# Patient Record
Sex: FEMALE | Race: WHITE | NOT HISPANIC OR LATINO | Employment: FULL TIME | URBAN - METROPOLITAN AREA
[De-identification: names, ages, dates, MRNs, and addresses within clinical notes are randomized per-mention and may not be internally consistent; named-entity substitution may affect disease eponyms.]

---

## 2017-06-26 ENCOUNTER — TELEPHONE (OUTPATIENT)
Dept: OBSTETRICS AND GYNECOLOGY | Facility: CLINIC | Age: 47
End: 2017-06-26

## 2017-06-26 DIAGNOSIS — Z12.39 BREAST CANCER SCREENING: Primary | ICD-10-CM

## 2017-06-26 NOTE — TELEPHONE ENCOUNTER
----- Message from Shavonne Naylor MA sent at 6/26/2017  9:48 AM CDT -----  Contact: Pt     ----- Message -----  From: Richard Piña  Sent: 6/23/2017   4:52 PM  To: Janeth VELASCO Staff    X_ 1st Request  _ 2nd Request  _ 3rd Request    Who:YAMIL HONEYCUTT [[id number]]    Why: Patient would like to have her Mammogram orders put in    What Number to Call Back: [phone]    When to Expect a call back: (Before the end of the day)  -- if call after 3:00 call back will be tomorrow.    Done. GH

## 2017-07-31 ENCOUNTER — OFFICE VISIT (OUTPATIENT)
Dept: OBSTETRICS AND GYNECOLOGY | Facility: CLINIC | Age: 47
End: 2017-07-31
Payer: COMMERCIAL

## 2017-07-31 ENCOUNTER — HOSPITAL ENCOUNTER (OUTPATIENT)
Dept: RADIOLOGY | Facility: OTHER | Age: 47
Discharge: HOME | End: 2017-07-31
Attending: NURSE PRACTITIONER
Payer: COMMERCIAL

## 2017-07-31 VITALS
DIASTOLIC BLOOD PRESSURE: 70 MMHG | WEIGHT: 182.56 LBS | BODY MASS INDEX: 32.35 KG/M2 | HEIGHT: 63 IN | SYSTOLIC BLOOD PRESSURE: 110 MMHG

## 2017-07-31 DIAGNOSIS — Z12.39 BREAST CANCER SCREENING: ICD-10-CM

## 2017-07-31 DIAGNOSIS — N92.0 MENORRHAGIA WITH REGULAR CYCLE: ICD-10-CM

## 2017-07-31 DIAGNOSIS — Z01.419 VISIT FOR GYNECOLOGIC EXAMINATION: Primary | ICD-10-CM

## 2017-07-31 PROCEDURE — 77067 SCR MAMMO BI INCL CAD: CPT | Mod: 26,,, | Performed by: RADIOLOGY

## 2017-07-31 PROCEDURE — 77067 SCR MAMMO BI INCL CAD: CPT | Mod: TC

## 2017-07-31 PROCEDURE — 77063 BREAST TOMOSYNTHESIS BI: CPT | Mod: 26,,, | Performed by: RADIOLOGY

## 2017-07-31 PROCEDURE — 99999 PR PBB SHADOW E&M-EST. PATIENT-LVL III: CPT | Mod: PBBFAC,,, | Performed by: OBSTETRICS & GYNECOLOGY

## 2017-07-31 PROCEDURE — 88142 CYTOPATH C/V THIN LAYER: CPT

## 2017-07-31 PROCEDURE — 99396 PREV VISIT EST AGE 40-64: CPT | Mod: S$GLB,,, | Performed by: OBSTETRICS & GYNECOLOGY

## 2017-07-31 RX ORDER — TRANEXAMIC ACID 650 MG/1
1300 TABLET ORAL 3 TIMES DAILY
Qty: 60 TABLET | Refills: 6 | Status: SHIPPED | OUTPATIENT
Start: 2017-07-31 | End: 2017-08-05

## 2017-07-31 NOTE — PROGRESS NOTES
HISTORY OF PRESENT ILLNESS:    Nisha Mei is a 46 y.o. female, , Patient's last menstrual period was 2017 (exact date).,  presents for a routine exam and has no complaints. MAMMO HAS BEEN ORDERED AND PAP SUBMITTED.  MENSES LAST 5 DAYS, 3-4 VERY HEAVY, NO INTERMENSTRUAL.  The patient was counseled on the options for treatment of dysfunctional uterine bleeding and menorrhagia, including combination hormonal Rx, Mirena IUD, cylcic progestins, NSAIDs and surgical intervention with D&C/Hysteroscope, Endometrial ablation, UAE, or Hysterectomy.  WILL TRY LYSTEDA NOW AND MAY BE GOOD CANDIDATE FOR EM ABLATION.  BTL FOR CONTRACEPTION. PELVIC USG TO R/O GROSS ENDOMETRIAL PATHOLOGY  [place] AND NEXT WEEK TO HER FATHER'S [facility] IN [facility]    LAST VISIT :   BTL FOR CONTRACEPTION.  MAMMO DUE AND ORDERED, PAP UP TO DATE   LAST VISIT 2014:  WAS ADVISED LAST YEAR RE REPEAT PAP THIS YEAR BUT IF NL WOULD F/U IN 3 YRS. MAMMO DUE, SCHEDULED. DAUGHTER JUST GRADUATED, GOING TO [employer] IN THE FALL  Last visit 2013:  PAP ASCUS, HPV NEGATIVE - NEEDS REPEAT PAP IN 1 YEAR AT ANNUAL VISIT   42 y.o. female, , Patient's last menstrual period was 2013., presents for a routine exam and has no complaints.   REF MAMMO. PAP SUBMITTED AND DICUSSED 3YR SCREENING RECOMMENDATIONS    Past Medical History:   Diagnosis Date    History of ovarian cyst        Past Surgical History:   Procedure Laterality Date    DILATION AND CURETTAGE OF UTERUS      x3    TUBAL LIGATION Bilateral 2008       MEDICATIONS AND ALLERGIES:      Current Outpatient Prescriptions:     tranexamic acid (LYSTEDA) 650 mg tablet, Take 2 tablets (1,300 mg total) by mouth 3 (three) times daily., Disp: 60 tablet, Rfl: 6    Review of patient's allergies indicates:  No Known Allergies    Family History   Problem Relation Age of Onset    Hypertension Mother     Mitral valve prolapse Mother     Diabetes Mellitus Paternal Grandfather   "   Heart attack Maternal Aunt     Breast cancer Neg Hx     Colon cancer Neg Hx     Ovarian cancer Neg Hx        Social History     Social History    Marital status:      Spouse name: N/A    Number of children: N/A    Years of education: N/A     Occupational History    Not on file.     Social History Main Topics    Smoking status: Never Smoker    Smokeless tobacco: Never Used    Alcohol use No    Drug use: No    Sexual activity: Yes     Partners: Male     Other Topics Concern    Not on file     Social History Narrative    No narrative on file       COMPREHENSIVE GYN HISTORY:  PAP History: Denies abnormal Paps.  Infection History: Denies STDs. Denies PID.  Benign History: Denies uterine fibroids. Denies ovarian cysts. Denies endometriosis. Denies other conditions.  Cancer History: Denies cervical cancer. Denies uterine cancer or hyperplasia. Denies ovarian cancer. Denies vulvar cancer or pre-cancer. Denies vaginal cancer or pre-cancer. Denies breast cancer. Denies colon cancer.  Sexual Activity History: Reports currently being sexually active  Menstrual History: Monthly, mild-moderate.  Contraception:bilateral tubal ligation    ROS:  GENERAL: No weight changes. No swelling. No fatigue. No fever.  CARDIOVASCULAR: No chest pain. No shortness of breath. No leg cramps.   NEUROLOGICAL: No headaches. No vision changes.  BREASTS: No pain. No lumps. No discharge.  ABDOMEN: No pain. No nausea. No vomiting. No diarrhea. No constipation.  REPRODUCTIVE: No abnormal bleeding.   VULVA: No pain. No lesions. No itching.  VAGINA: No relaxation. No itching. No odor. No discharge. No lesions.  URINARY: No incontinence. No nocturia. No frequency. No dysuria.    /70   Ht 5' 3" (1.6 m)   Wt 82.8 kg (182 lb 8.7 oz)   LMP 07/31/2017 (Exact Date)   BMI 32.34 kg/m²     PE:  APPEARANCE: Well nourished, well developed, in no acute distress.  AFFECT: WNL, alert and oriented x 3.  SKIN: No acne or hirsutism.  NECK: " Neck symmetric, without masses or thyromegaly.  NODES: No inguinal, cervical, axillary or femoral lymph node enlargement.  CHEST: Good respiratory effort.   ABDOMEN: Soft. No tenderness or masses. No hepatosplenomegaly. No hernias.  BREASTS: Symmetrical, no skin changes, visible lesions, palpable masses or nipple discharge bilaterally.  PELVIC: External female genitalia without lesions.  Female hair distribution. Adequate perineal body, Normal urethral meatus. Vagina moist and well rugated without lesions or discharge.  No significant cystocele or rectocele present. Cervix pink without lesions, discharge or tenderness. Uterus is normal size, regular, mobile and nontender. Adnexa without masses or tenderness.  EXTREMITIES: No edema    PROCEDURES:  Pap    DIAGNOSIS:  1. Visit for gynecologic examination  Liquid-based pap smear, screening   2. Menorrhagia with regular cycle  US Pelvis Comp with Transvag NON-OB (xpd       LABS AND TESTS ORDERED:    MEDICATIONS PRESCRIBED:    COUNSELING:   The patient was counseled today on ACS PAP guidelines, with recommendations for yearly pelvic exams unless their uterus, cervix, and ovaries were removed for benign reasons; in that case, examinations every 3-5 years are recommended.  The patient was also counseled regarding monthly breast self-examination, routine STD screening for at-risk populations, prophylactic immunizations for transmitted infections such as  HPV, Pertussis, or Influenza as appropriate, and yearly mammograms when indicated by ACS guidelines.  She was advised to see her primary care physician for all other health maintenance.    FOLLOW-UP with me annually.

## 2017-08-02 ENCOUNTER — HOSPITAL ENCOUNTER (OUTPATIENT)
Dept: RADIOLOGY | Facility: OTHER | Age: 47
Discharge: HOME | End: 2017-08-02
Attending: OBSTETRICS & GYNECOLOGY
Payer: COMMERCIAL

## 2017-08-02 DIAGNOSIS — N92.0 MENORRHAGIA WITH REGULAR CYCLE: ICD-10-CM

## 2017-08-02 PROCEDURE — 76856 US EXAM PELVIC COMPLETE: CPT | Mod: TC

## 2017-08-02 PROCEDURE — 76830 TRANSVAGINAL US NON-OB: CPT | Mod: 26,,, | Performed by: RADIOLOGY

## 2017-08-02 PROCEDURE — 76856 US EXAM PELVIC COMPLETE: CPT | Mod: 26,,, | Performed by: RADIOLOGY

## 2017-08-05 ENCOUNTER — PATIENT MESSAGE (OUTPATIENT)
Dept: OBSTETRICS AND GYNECOLOGY | Facility: HOSPITAL | Age: 47
End: 2017-08-05

## 2017-08-06 ENCOUNTER — PATIENT MESSAGE (OUTPATIENT)
Dept: OBSTETRICS AND GYNECOLOGY | Facility: CLINIC | Age: 47
End: 2017-08-06

## 2017-11-06 ENCOUNTER — PATIENT MESSAGE (OUTPATIENT)
Dept: OBSTETRICS AND GYNECOLOGY | Facility: CLINIC | Age: 47
End: 2017-11-06

## 2017-11-07 NOTE — TELEPHONE ENCOUNTER
MESSAGE TO PATIENT:      Recuperation occurs over a long weekend, and there is some vaginal discharge over about 3 weeks as the uterus heals itself.  There isn't really any more downtime than with a D&C.  I know that we have some time left open on the schedule for the procedure before the end of the year - I'll ask Marilee to get in touch with you to choose a Thursday.  It'll be the best [place] gift you'll ever give yourself if you can stop fighting heavy periods . .

## 2017-11-09 ENCOUNTER — TELEPHONE (OUTPATIENT)
Dept: OBSTETRICS AND GYNECOLOGY | Facility: CLINIC | Age: 47
End: 2017-11-09

## 2017-11-09 NOTE — TELEPHONE ENCOUNTER
----- Message from Gloria Degroot sent at 11/8/2017  1:44 PM CST -----  Contact: Patient   X _1st Request  _  2nd Request  _  3rd Request    Who:YAMIL HONEYCUTT [[id number]]    Why:Patient was returning a call back to have a procedure schedule per     What Number to Call Back:[phone]    When to Expect a call back: (Before the end of the day)   -- if call after 3:00 call back will be tomorrow.

## 2017-11-14 ENCOUNTER — TELEPHONE (OUTPATIENT)
Dept: OBSTETRICS AND GYNECOLOGY | Facility: CLINIC | Age: 47
End: 2017-11-14

## 2017-11-14 NOTE — TELEPHONE ENCOUNTER
----- Message from Jazzmine Whiting sent at 11/13/2017  3:21 PM CST -----  _  x1st Request  _  2nd Request  _  3rd Request        Who: ham    Why: pt. Calling to speak with nurse regarding surgery. Please call to discuss    What Number to Call Back:[phone]    When to Expect a call back: (Before the end of the day)   -- if the call is after 12:00, the call back will be tomorrow.

## 2017-12-05 DIAGNOSIS — N93.9 ABNORMAL UTERINE BLEEDING (AUB): Primary | ICD-10-CM

## 2017-12-05 RX ORDER — MUPIROCIN 20 MG/G
OINTMENT TOPICAL
Status: CANCELLED | OUTPATIENT
Start: 2017-12-05

## 2017-12-20 ENCOUNTER — ANESTHESIA EVENT (OUTPATIENT)
Dept: SURGERY | Facility: OTHER | Age: 47
End: 2017-12-20
Payer: COMMERCIAL

## 2017-12-20 ENCOUNTER — HOSPITAL ENCOUNTER (OUTPATIENT)
Dept: PREADMISSION TESTING | Facility: OTHER | Age: 47
Discharge: HOME | End: 2017-12-20
Attending: OBSTETRICS & GYNECOLOGY
Payer: COMMERCIAL

## 2017-12-20 ENCOUNTER — OFFICE VISIT (OUTPATIENT)
Dept: OBSTETRICS AND GYNECOLOGY | Facility: CLINIC | Age: 47
End: 2017-12-20
Payer: COMMERCIAL

## 2017-12-20 VITALS
SYSTOLIC BLOOD PRESSURE: 110 MMHG | DIASTOLIC BLOOD PRESSURE: 70 MMHG | BODY MASS INDEX: 32.94 KG/M2 | HEIGHT: 62 IN | WEIGHT: 179 LBS

## 2017-12-20 VITALS
TEMPERATURE: 98 F | OXYGEN SATURATION: 98 % | DIASTOLIC BLOOD PRESSURE: 72 MMHG | BODY MASS INDEX: 30.36 KG/M2 | HEART RATE: 98 BPM | SYSTOLIC BLOOD PRESSURE: 114 MMHG | WEIGHT: 165 LBS | HEIGHT: 62 IN

## 2017-12-20 DIAGNOSIS — N92.0 MENORRHAGIA WITH REGULAR CYCLE: Primary | ICD-10-CM

## 2017-12-20 LAB
HCT VFR BLD AUTO: 34.7 %
HGB BLD-MCNC: 11.2 G/DL

## 2017-12-20 PROCEDURE — 86900 BLOOD TYPING SEROLOGIC ABO: CPT

## 2017-12-20 PROCEDURE — 86850 RBC ANTIBODY SCREEN: CPT

## 2017-12-20 PROCEDURE — 99499 UNLISTED E&M SERVICE: CPT | Mod: 56,S$GLB,, | Performed by: OBSTETRICS & GYNECOLOGY

## 2017-12-20 PROCEDURE — 36415 COLL VENOUS BLD VENIPUNCTURE: CPT

## 2017-12-20 PROCEDURE — 85014 HEMATOCRIT: CPT

## 2017-12-20 PROCEDURE — 99999 PR PBB SHADOW E&M-EST. PATIENT-LVL II: CPT | Mod: PBBFAC,,, | Performed by: OBSTETRICS & GYNECOLOGY

## 2017-12-20 PROCEDURE — 85018 HEMOGLOBIN: CPT

## 2017-12-20 RX ORDER — SCOLOPAMINE TRANSDERMAL SYSTEM 1 MG/1
1 PATCH, EXTENDED RELEASE TRANSDERMAL ONCE
Status: CANCELLED | OUTPATIENT
Start: 2017-12-28

## 2017-12-20 RX ORDER — LIDOCAINE HYDROCHLORIDE 10 MG/ML
1 INJECTION, SOLUTION EPIDURAL; INFILTRATION; INTRACAUDAL; PERINEURAL ONCE
Status: CANCELLED | OUTPATIENT
Start: 2017-12-20 | End: 2017-12-20

## 2017-12-20 RX ORDER — SODIUM CHLORIDE, SODIUM LACTATE, POTASSIUM CHLORIDE, CALCIUM CHLORIDE 600; 310; 30; 20 MG/100ML; MG/100ML; MG/100ML; MG/100ML
INJECTION, SOLUTION INTRAVENOUS CONTINUOUS
Status: CANCELLED | OUTPATIENT
Start: 2017-12-20

## 2017-12-20 RX ORDER — FAMOTIDINE 20 MG/1
20 TABLET, FILM COATED ORAL
Status: CANCELLED | OUTPATIENT
Start: 2017-12-20 | End: 2017-12-20

## 2017-12-20 RX ORDER — MIDAZOLAM HYDROCHLORIDE 5 MG/ML
4 INJECTION INTRAMUSCULAR; INTRAVENOUS
Status: CANCELLED | OUTPATIENT
Start: 2017-12-20

## 2017-12-20 RX ORDER — OXYCODONE HYDROCHLORIDE 5 MG/1
5 TABLET ORAL ONCE AS NEEDED
Status: CANCELLED | OUTPATIENT
Start: 2017-12-20 | End: 2017-12-20

## 2017-12-20 RX ORDER — SCOLOPAMINE TRANSDERMAL SYSTEM 1 MG/1
1 PATCH, EXTENDED RELEASE TRANSDERMAL
Qty: 1 PATCH | Refills: 0 | Status: SHIPPED | OUTPATIENT
Start: 2017-12-20 | End: 2018-10-10

## 2017-12-20 NOTE — ANESTHESIA PREPROCEDURE EVALUATION
12/20/2017  Nisha Mei is a 47 y.o., female.    Anesthesia Evaluation    I have reviewed the Patient Summary Reports.    I have reviewed the Nursing Notes.   I have reviewed the Medications.     Review of Systems  Anesthesia Hx:  No problems with previous Anesthesia    Social:  Social Alcohol Use, Non-Smoker    Hematology/Oncology:  Hematology Normal   Oncology Normal     EENT/Dental:EENT/Dental Normal   Cardiovascular:  Cardiovascular Normal Exercise tolerance: good     Pulmonary:   Shortness of breath    Renal/:  Renal/ Normal     Hepatic/GI:  Hepatic/GI Normal    Musculoskeletal:  Musculoskeletal Normal    Neurological:  Neurology Normal    Endocrine:  Endocrine Normal    Dermatological:  Skin Normal    Psych:  Psychiatric Normal           Physical Exam  General:  Well nourished    Airway/Jaw/Neck:  Airway Findings: Mouth Opening: Normal Tongue: Normal  General Airway Assessment: Adult, Good  Mallampati: I  TM Distance: Normal, at least 6 cm  Jaw/Neck Findings:  Neck ROM: Normal ROM      Dental:  Dental Findings: In tact        Mental Status:  Mental Status Findings:  Cooperative, Alert and Oriented         Anesthesia Plan  Type of Anesthesia, risks & benefits discussed:  Anesthesia Type:  general  Patient's Preference:   Intra-op Monitoring Plan: standard ASA monitors  Intra-op Monitoring Plan Comments:   Post Op Pain Control Plan:   Post Op Pain Control Plan Comments:   Induction:    Beta Blocker:         Informed Consent: Patient understands risks and agrees with Anesthesia plan.  Questions answered. Anesthesia consent signed with patient.  ASA Score: 1     Day of Surgery Review of History & Physical:    H&P update referred to the surgeon.     Anesthesia Plan Notes: H/H and T&S.        Ready For Surgery From Anesthesia Perspective.

## 2017-12-20 NOTE — DISCHARGE INSTRUCTIONS
PRE-ADMIT TESTING -  [phone]    [address]  [facility]          Your surgery has been scheduled at [facility]. We are pleased to have the opportunity to serve you. For Further Information please call [phone].    On the day of surgery please report to the Information Desk on the 1st floor.    · CONTACT YOUR PHYSICIAN'S OFFICE THE DAY PRIOR TO YOUR SURGERY TO OBTAIN YOUR ARRIVAL TIME.     · The evening before surgery do not eat anything after 9 p.m. ( this includes hard candy, chewing gum and mints).  You may only have GATORADE, POWERADE AND WATER  from 9 p.m. until you leave your home.   DO NOT DRINK ANY LIQUIDS ON THE WAY TO THE HOSPITAL.      SPECIAL MEDICATION INSTRUCTIONS: TAKE medications checked off by the Anesthesiologist on your Medication List.    Angiogram Patients: Take medications as instructed by your physician, including aspirin.     Surgery Patients:    If you take ASPIRIN - Your PHYSICIAN/SURGEON will need to inform you IF/OR when you need to stop taking aspirin prior to your surgery.     Do Not take any medications containing IBUPROFEN.  Do Not Wear any make-up or dark nail polish   (especially eye make-up) to surgery. If you come to surgery with makeup on you will be required to remove the makeup or nail polish.    Do not shave your surgical area at least 5 days prior to your surgery. The surgical prep will be performed at the hospital according to Infection Control regulations.    Leave all valuables at home.   Do Not wear any jewelry or watches, including any metal in body piercings.  Contact Lens must be removed before surgery. Either do not wear the contact lens or bring a case and solution for storage.  Please bring a container for eyeglasses or dentures as required.  Bring any paperwork your physician has provided, such as consent forms,  history and physicals, doctor's orders, etc.   Bring comfortable clothes that are loose fitting to wear upon  discharge. Take into consideration the type of surgery being performed.  Maintain your diet as advised per your physician the day prior to surgery.      Adequate rest the night before surgery is advised.   Park in the Parking lot behind the hospital or in the [facility] across the street from the parking lot. Parking is complimentary.  If you will be discharged the same day as your procedure, please arrange for a responsible adult to drive you home or to accompany you if traveling by taxi.   YOU WILL NOT BE PERMITTED TO DRIVE OR TO LEAVE THE HOSPITAL ALONE AFTER SURGERY.   It is strongly recommended that you arrange for someone to remain with you for the first 24 hrs following your surgery.       Thank you for your cooperation.  The Staff of [facility].        Bathing Instructions                                                                 Please shower the evening before and morning of your procedure with    ANTIBACTERIAL SOAP. ( DIAL, etc )  Concentrate on the surgical area   for at least 3 minutes and rinse completely. Dry off as usual.   Do not use any deodorant, powder, body lotions, perfume, after shave or    cologne.

## 2017-12-20 NOTE — PROGRESS NOTES
DISCUSSION OF PREOPERATIVE MANAGEMENT OPTIONS AND ANTICIPATED TREATMENT OUTCOMES:    Nisha Mei is a 47 y.o. female , Patient's last menstrual period was 12/10/2017 (approximate)., who presents  presents today preoperatively for discussion of management options, projected postoperative recovery, and anticipated outcomes following her planned procedure. Alternatives to the procedure including possible medical therapy was reviewed.  The patient is scheduled for HYSTEROSCOPY D&C 2017.      HISTORY AND PHYSICAL EXAMINATION DERIVED FORM PRIOR OFFICE VISITS REVIEWED:  Nisha Mei is a 46 y.o. female, , Patient's last menstrual period was 2017 (exact date).,  presents for a routine exam and has no complaints. MAMMO HAS BEEN ORDERED AND PAP SUBMITTED.  MENSES LAST 5 DAYS, 3-4 VERY HEAVY, NO INTERMENSTRUAL.  The patient was counseled on the options for treatment of dysfunctional uterine bleeding and menorrhagia, including combination hormonal Rx, Mirena IUD, cylcic progestins, NSAIDs and surgical intervention with D&C/Hysteroscope, Endometrial ablation, UAE, or Hysterectomy.  WILL TRY LYSTEDA NOW AND MAY BE GOOD CANDIDATE FOR EM ABLATION.  BTL FOR CONTRACEPTION. PELVIC USG TO R/O GROSS ENDOMETRIAL PATHOLOGY  [place] AND NEXT WEEK TO HER FATHER'S [facility] IN [facility]  2017:  FINDINGS:  Uterus measures 8.6 x 4.5 x 6.0cm.  No focal abnormality.  The endometrial stripe is not thickened measuring 0.4cm.  Right ovary appears within normal limits, and measures 2.2 x 1.3 x 1.8 cm.  Left ovary was not confidently identified.   There is no evidence of free fluid within the pelvis.   Impression   Left ovary not confidently visualized.   Otherwise unremarkable examination. Uterus appears within normal limits.        LAST VISIT :   BTL FOR CONTRACEPTION.  MAMMO DUE AND ORDERED, PAP UP TO DATE   LAST VISIT 2014:  WAS ADVISED LAST YEAR RE REPEAT PAP THIS YEAR BUT IF NL WOULD  F/U IN 3 YRS. MAMMO DUE, SCHEDULED. DAUGHTER JUST GRADUATED, GOING TO [employer] IN THE FALL  Last visit 2013:  PAP ASCUS, HPV NEGATIVE - NEEDS REPEAT PAP IN 1 YEAR AT ANNUAL VISIT   42 y.o. female, , Patient's last menstrual period was 2013., presents for a routine exam and has no complaints.   REF MAMMO. PAP SUBMITTED AND DICUSSED 3YR SCREENING RECOMMENDATIONS          Past Medical History:   Diagnosis Date    History of ovarian cyst                 Past Surgical History:   Procedure Laterality Date    DILATION AND CURETTAGE OF UTERUS         x3    TUBAL LIGATION Bilateral 2008         MEDICATIONS AND ALLERGIES:        Current Outpatient Prescriptions:     tranexamic acid (LYSTEDA) 650 mg tablet, Take 2 tablets (1,300 mg total) by mouth 3 (three) times daily., Disp: 60 tablet, Rfl: 6     Review of patient's allergies indicates:  No Known Allergies           Family History   Problem Relation Age of Onset    Hypertension Mother      Mitral valve prolapse Mother      Diabetes Mellitus Paternal Grandfather      Heart attack Maternal Aunt      Breast cancer Neg Hx      Colon cancer Neg Hx      Ovarian cancer Neg Hx           Social History   Social History            Social History    Marital status:        Spouse name: N/A    Number of children: N/A    Years of education: N/A          Occupational History    Not on file.            Social History Main Topics    Smoking status: Never Smoker    Smokeless tobacco: Never Used    Alcohol use No    Drug use: No    Sexual activity: Yes       Partners: Male           Other Topics Concern    Not on file          Social History Narrative    No narrative on file            COMPREHENSIVE GYN HISTORY:  PAP History: Denies abnormal Paps.  Infection History: Denies STDs. Denies PID.  Benign History: Denies uterine fibroids. Denies ovarian cysts. Denies endometriosis. Denies other conditions.  Cancer History: Denies cervical cancer. Denies  "uterine cancer or hyperplasia. Denies ovarian cancer. Denies vulvar cancer or pre-cancer. Denies vaginal cancer or pre-cancer. Denies breast cancer. Denies colon cancer.  Sexual Activity History: Reports currently being sexually active  Menstrual History: Monthly, mild-moderate.  Contraception:bilateral tubal ligation     ROS:  GENERAL: No weight changes. No swelling. No fatigue. No fever.  CARDIOVASCULAR: No chest pain. No shortness of breath. No leg cramps.   NEUROLOGICAL: No headaches. No vision changes.  BREASTS: No pain. No lumps. No discharge.  ABDOMEN: No pain. No nausea. No vomiting. No diarrhea. No constipation.  REPRODUCTIVE: No abnormal bleeding.   VULVA: No pain. No lesions. No itching.  VAGINA: No relaxation. No itching. No odor. No discharge. No lesions.  URINARY: No incontinence. No nocturia. No frequency. No dysuria.     /70   Ht 5' 3" (1.6 m)   Wt 82.8 kg (182 lb 8.7 oz)   LMP 07/31/2017 (Exact Date)   BMI 32.34 kg/m²      PE:  APPEARANCE: Well nourished, well developed, in no acute distress.  AFFECT: WNL, alert and oriented x 3.  SKIN: No acne or hirsutism.  NECK: Neck symmetric, without masses or thyromegaly.  NODES: No inguinal, cervical, axillary or femoral lymph node enlargement.  CHEST: Good respiratory effort.   ABDOMEN: Soft. No tenderness or masses. No hepatosplenomegaly. No hernias.  BREASTS: Symmetrical, no skin changes, visible lesions, palpable masses or nipple discharge bilaterally.  PELVIC: External female genitalia without lesions.  Female hair distribution. Adequate perineal body, Normal urethral meatus. Vagina moist and well rugated without lesions or discharge.  No significant cystocele or rectocele present. Cervix pink without lesions, discharge or tenderness. Uterus is normal size, regular, mobile and nontender. Adnexa without masses or tenderness.  EXTREMITIES: No edema      A procedure-specific consent form was reviewed with the patient and signed.  The patient " seemed to understand the procedure and it potential complications.  She did not have further questions at the end of the visit.      PLAN:  Proceed with the scheduled procedure as planned.    FOLLOW-UP:  After hospitalization.  A postoperative visit 2-3 weeks following the procedure was advised and scheduled.

## 2017-12-21 LAB
ABO + RH BLD: NORMAL
BLD GP AB SCN CELLS X3 SERPL QL: NORMAL

## 2017-12-27 ENCOUNTER — TELEPHONE (OUTPATIENT)
Dept: OBSTETRICS AND GYNECOLOGY | Facility: CLINIC | Age: 47
End: 2017-12-27

## 2017-12-27 NOTE — TELEPHONE ENCOUNTER
----- Message from Ace Velasquez sent at 12/27/2017  4:33 PM CST -----  Contact: Pt  _x  1st Request  _  2nd Request  _  3rd Request        Who: YAMIL HONEYCUTT [[id number]]    Why: Requesting a call back in regards to confirming time to arrive for procedure scheduled tomorrow.  Please return the call at earliest convenience. Thanks!          What Number to Call Back:[phone]      When to Expect a call back: (Within 24 hours)

## 2017-12-28 ENCOUNTER — SURGERY (OUTPATIENT)
Age: 47
End: 2017-12-28

## 2017-12-28 ENCOUNTER — ANESTHESIA (OUTPATIENT)
Dept: SURGERY | Facility: OTHER | Age: 47
End: 2017-12-28
Payer: COMMERCIAL

## 2017-12-28 ENCOUNTER — HOSPITAL ENCOUNTER (OUTPATIENT)
Facility: OTHER | Age: 47
Discharge: HOME | End: 2017-12-28
Attending: OBSTETRICS & GYNECOLOGY | Admitting: OBSTETRICS & GYNECOLOGY
Payer: COMMERCIAL

## 2017-12-28 VITALS
RESPIRATION RATE: 18 BRPM | OXYGEN SATURATION: 100 % | HEART RATE: 60 BPM | TEMPERATURE: 98 F | WEIGHT: 165 LBS | SYSTOLIC BLOOD PRESSURE: 126 MMHG | DIASTOLIC BLOOD PRESSURE: 70 MMHG | HEIGHT: 62 IN | BODY MASS INDEX: 30.36 KG/M2

## 2017-12-28 DIAGNOSIS — N93.9 ABNORMAL UTERINE BLEEDING (AUB): Primary | ICD-10-CM

## 2017-12-28 DIAGNOSIS — Z98.890 S/P D&C (STATUS POST DILATION AND CURETTAGE): ICD-10-CM

## 2017-12-28 LAB
B-HCG UR QL: NEGATIVE
CTP QC/QA: YES

## 2017-12-28 PROCEDURE — 37000009 HC ANESTHESIA EA ADD 15 MINS: Performed by: OBSTETRICS & GYNECOLOGY

## 2017-12-28 PROCEDURE — 88305 TISSUE EXAM BY PATHOLOGIST: CPT | Performed by: PATHOLOGY

## 2017-12-28 PROCEDURE — 71000016 HC POSTOP RECOV ADDL HR: Performed by: OBSTETRICS & GYNECOLOGY

## 2017-12-28 PROCEDURE — 25000003 PHARM REV CODE 250: Performed by: NURSE ANESTHETIST, CERTIFIED REGISTERED

## 2017-12-28 PROCEDURE — 88305 TISSUE EXAM BY PATHOLOGIST: CPT | Mod: 26,,, | Performed by: PATHOLOGY

## 2017-12-28 PROCEDURE — 36000707: Performed by: OBSTETRICS & GYNECOLOGY

## 2017-12-28 PROCEDURE — 37000008 HC ANESTHESIA 1ST 15 MINUTES: Performed by: OBSTETRICS & GYNECOLOGY

## 2017-12-28 PROCEDURE — 71000015 HC POSTOP RECOV 1ST HR: Performed by: OBSTETRICS & GYNECOLOGY

## 2017-12-28 PROCEDURE — 25000003 PHARM REV CODE 250: Performed by: SPECIALIST

## 2017-12-28 PROCEDURE — 27201423 OPTIME MED/SURG SUP & DEVICES STERILE SUPPLY: Performed by: OBSTETRICS & GYNECOLOGY

## 2017-12-28 PROCEDURE — 63600175 PHARM REV CODE 636 W HCPCS: Performed by: SPECIALIST

## 2017-12-28 PROCEDURE — 58563 HYSTEROSCOPY ABLATION: CPT | Mod: ,,, | Performed by: OBSTETRICS & GYNECOLOGY

## 2017-12-28 PROCEDURE — 36000706: Performed by: OBSTETRICS & GYNECOLOGY

## 2017-12-28 PROCEDURE — 63600175 PHARM REV CODE 636 W HCPCS: Performed by: NURSE ANESTHETIST, CERTIFIED REGISTERED

## 2017-12-28 PROCEDURE — 25000003 PHARM REV CODE 250: Performed by: OBSTETRICS & GYNECOLOGY

## 2017-12-28 PROCEDURE — 71000033 HC RECOVERY, INTIAL HOUR: Performed by: OBSTETRICS & GYNECOLOGY

## 2017-12-28 PROCEDURE — 81025 URINE PREGNANCY TEST: CPT | Performed by: SPECIALIST

## 2017-12-28 RX ORDER — ACETAMINOPHEN 10 MG/ML
INJECTION, SOLUTION INTRAVENOUS
Status: DISCONTINUED | OUTPATIENT
Start: 2017-12-28 | End: 2017-12-28

## 2017-12-28 RX ORDER — MIDAZOLAM HYDROCHLORIDE 5 MG/ML
4 INJECTION INTRAMUSCULAR; INTRAVENOUS
Status: DISCONTINUED | OUTPATIENT
Start: 2017-12-28 | End: 2017-12-28 | Stop reason: HOSPADM

## 2017-12-28 RX ORDER — ONDANSETRON 2 MG/ML
4 INJECTION INTRAMUSCULAR; INTRAVENOUS DAILY PRN
Status: DISCONTINUED | OUTPATIENT
Start: 2017-12-28 | End: 2017-12-28 | Stop reason: HOSPADM

## 2017-12-28 RX ORDER — OXYCODONE AND ACETAMINOPHEN 5; 325 MG/1; MG/1
1 TABLET ORAL EVERY 4 HOURS PRN
Qty: 10 TABLET | Refills: 0 | Status: SHIPPED | OUTPATIENT
Start: 2017-12-28 | End: 2018-10-10

## 2017-12-28 RX ORDER — FAMOTIDINE 20 MG/1
20 TABLET, FILM COATED ORAL
Status: COMPLETED | OUTPATIENT
Start: 2017-12-28 | End: 2017-12-28

## 2017-12-28 RX ORDER — SODIUM CHLORIDE, SODIUM LACTATE, POTASSIUM CHLORIDE, CALCIUM CHLORIDE 600; 310; 30; 20 MG/100ML; MG/100ML; MG/100ML; MG/100ML
INJECTION, SOLUTION INTRAVENOUS CONTINUOUS
Status: DISCONTINUED | OUTPATIENT
Start: 2017-12-28 | End: 2017-12-28 | Stop reason: HOSPADM

## 2017-12-28 RX ORDER — OXYCODONE HYDROCHLORIDE 5 MG/1
5 TABLET ORAL
Status: DISCONTINUED | OUTPATIENT
Start: 2017-12-28 | End: 2017-12-28 | Stop reason: HOSPADM

## 2017-12-28 RX ORDER — MUPIROCIN 20 MG/G
OINTMENT TOPICAL
Status: DISCONTINUED | OUTPATIENT
Start: 2017-12-28 | End: 2017-12-28 | Stop reason: HOSPADM

## 2017-12-28 RX ORDER — LIDOCAINE HCL/PF 100 MG/5ML
SYRINGE (ML) INTRAVENOUS
Status: DISCONTINUED | OUTPATIENT
Start: 2017-12-28 | End: 2017-12-28

## 2017-12-28 RX ORDER — FENTANYL CITRATE 50 UG/ML
INJECTION, SOLUTION INTRAMUSCULAR; INTRAVENOUS
Status: DISCONTINUED | OUTPATIENT
Start: 2017-12-28 | End: 2017-12-28

## 2017-12-28 RX ORDER — PROPOFOL 10 MG/ML
VIAL (ML) INTRAVENOUS
Status: DISCONTINUED | OUTPATIENT
Start: 2017-12-28 | End: 2017-12-28

## 2017-12-28 RX ORDER — FENTANYL CITRATE 50 UG/ML
25 INJECTION, SOLUTION INTRAMUSCULAR; INTRAVENOUS EVERY 5 MIN PRN
Status: DISCONTINUED | OUTPATIENT
Start: 2017-12-28 | End: 2017-12-28 | Stop reason: HOSPADM

## 2017-12-28 RX ORDER — IBUPROFEN 600 MG/1
600 TABLET ORAL 3 TIMES DAILY
Qty: 30 TABLET | Refills: 1 | Status: SHIPPED | OUTPATIENT
Start: 2017-12-28 | End: 2019-06-11

## 2017-12-28 RX ORDER — HYDROMORPHONE HYDROCHLORIDE 2 MG/ML
0.4 INJECTION, SOLUTION INTRAMUSCULAR; INTRAVENOUS; SUBCUTANEOUS EVERY 5 MIN PRN
Status: DISCONTINUED | OUTPATIENT
Start: 2017-12-28 | End: 2017-12-28 | Stop reason: HOSPADM

## 2017-12-28 RX ORDER — LIDOCAINE HYDROCHLORIDE 10 MG/ML
1 INJECTION, SOLUTION EPIDURAL; INFILTRATION; INTRACAUDAL; PERINEURAL ONCE
Status: DISCONTINUED | OUTPATIENT
Start: 2017-12-28 | End: 2017-12-28 | Stop reason: HOSPADM

## 2017-12-28 RX ORDER — KETOROLAC TROMETHAMINE 30 MG/ML
INJECTION, SOLUTION INTRAMUSCULAR; INTRAVENOUS
Status: DISCONTINUED | OUTPATIENT
Start: 2017-12-28 | End: 2017-12-28

## 2017-12-28 RX ORDER — SODIUM CHLORIDE 0.9 % (FLUSH) 0.9 %
3 SYRINGE (ML) INJECTION
Status: DISCONTINUED | OUTPATIENT
Start: 2017-12-28 | End: 2017-12-28 | Stop reason: HOSPADM

## 2017-12-28 RX ORDER — MEPERIDINE HYDROCHLORIDE 50 MG/ML
12.5 INJECTION INTRAMUSCULAR; INTRAVENOUS; SUBCUTANEOUS ONCE AS NEEDED
Status: DISCONTINUED | OUTPATIENT
Start: 2017-12-28 | End: 2017-12-28 | Stop reason: HOSPADM

## 2017-12-28 RX ORDER — ONDANSETRON 2 MG/ML
INJECTION INTRAMUSCULAR; INTRAVENOUS
Status: DISCONTINUED | OUTPATIENT
Start: 2017-12-28 | End: 2017-12-28

## 2017-12-28 RX ORDER — DEXAMETHASONE SODIUM PHOSPHATE 4 MG/ML
INJECTION, SOLUTION INTRA-ARTICULAR; INTRALESIONAL; INTRAMUSCULAR; INTRAVENOUS; SOFT TISSUE
Status: DISCONTINUED | OUTPATIENT
Start: 2017-12-28 | End: 2017-12-28

## 2017-12-28 RX ORDER — GLYCOPYRROLATE 0.2 MG/ML
INJECTION INTRAMUSCULAR; INTRAVENOUS
Status: DISCONTINUED | OUTPATIENT
Start: 2017-12-28 | End: 2017-12-28

## 2017-12-28 RX ORDER — SCOLOPAMINE TRANSDERMAL SYSTEM 1 MG/1
1 PATCH, EXTENDED RELEASE TRANSDERMAL ONCE
Status: COMPLETED | OUTPATIENT
Start: 2017-12-28 | End: 2017-12-28

## 2017-12-28 RX ADMIN — SODIUM CHLORIDE, SODIUM LACTATE, POTASSIUM CHLORIDE, AND CALCIUM CHLORIDE: 600; 310; 30; 20 INJECTION, SOLUTION INTRAVENOUS at 09:12

## 2017-12-28 RX ADMIN — ACETAMINOPHEN 1000 MG: 10 INJECTION, SOLUTION INTRAVENOUS at 09:12

## 2017-12-28 RX ADMIN — EPHEDRINE SULFATE 10 MG: 50 INJECTION INTRAMUSCULAR; INTRAVENOUS; SUBCUTANEOUS at 09:12

## 2017-12-28 RX ADMIN — GLYCOPYRROLATE 0.2 MG: 0.2 INJECTION, SOLUTION INTRAMUSCULAR; INTRAVENOUS at 09:12

## 2017-12-28 RX ADMIN — MIDAZOLAM HYDROCHLORIDE 4 MG: 5 INJECTION, SOLUTION INTRAMUSCULAR; INTRAVENOUS at 08:12

## 2017-12-28 RX ADMIN — ONDANSETRON 4 MG: 2 INJECTION INTRAMUSCULAR; INTRAVENOUS at 09:12

## 2017-12-28 RX ADMIN — LIDOCAINE HYDROCHLORIDE 50 MG: 20 INJECTION, SOLUTION INTRAVENOUS at 09:12

## 2017-12-28 RX ADMIN — FAMOTIDINE 20 MG: 20 TABLET, FILM COATED ORAL at 08:12

## 2017-12-28 RX ADMIN — KETOROLAC TROMETHAMINE 30 MG: 30 INJECTION, SOLUTION INTRAMUSCULAR; INTRAVENOUS at 09:12

## 2017-12-28 RX ADMIN — PROPOFOL 200 MG: 10 INJECTION, EMULSION INTRAVENOUS at 09:12

## 2017-12-28 RX ADMIN — FENTANYL CITRATE 100 MCG: 50 INJECTION, SOLUTION INTRAMUSCULAR; INTRAVENOUS at 09:12

## 2017-12-28 RX ADMIN — SCOPALAMINE 1 PATCH: 1 PATCH, EXTENDED RELEASE TRANSDERMAL at 08:12

## 2017-12-28 RX ADMIN — CARBOXYMETHYLCELLULOSE SODIUM 2 DROP: 2.5 SOLUTION/ DROPS OPHTHALMIC at 09:12

## 2017-12-28 RX ADMIN — DEXAMETHASONE SODIUM PHOSPHATE 4 MG: 4 INJECTION, SOLUTION INTRAMUSCULAR; INTRAVENOUS at 09:12

## 2017-12-28 RX ADMIN — MUPIROCIN: 20 OINTMENT TOPICAL at 08:12

## 2017-12-28 NOTE — H&P (VIEW-ONLY)
DISCUSSION OF PREOPERATIVE MANAGEMENT OPTIONS AND ANTICIPATED TREATMENT OUTCOMES:    Nisha Mei is a 47 y.o. female , Patient's last menstrual period was 12/10/2017 (approximate)., who presents  presents today preoperatively for discussion of management options, projected postoperative recovery, and anticipated outcomes following her planned procedure. Alternatives to the procedure including possible medical therapy was reviewed.  The patient is scheduled for HYSTEROSCOPY D&C 2017.      HISTORY AND PHYSICAL EXAMINATION DERIVED FORM PRIOR OFFICE VISITS REVIEWED:  Nisha Mei is a 46 y.o. female, , Patient's last menstrual period was 2017 (exact date).,  presents for a routine exam and has no complaints. MAMMO HAS BEEN ORDERED AND PAP SUBMITTED.  MENSES LAST 5 DAYS, 3-4 VERY HEAVY, NO INTERMENSTRUAL.  The patient was counseled on the options for treatment of dysfunctional uterine bleeding and menorrhagia, including combination hormonal Rx, Mirena IUD, cylcic progestins, NSAIDs and surgical intervention with D&C/Hysteroscope, Endometrial ablation, UAE, or Hysterectomy.  WILL TRY LYSTEDA NOW AND MAY BE GOOD CANDIDATE FOR EM ABLATION.  BTL FOR CONTRACEPTION. PELVIC USG TO R/O GROSS ENDOMETRIAL PATHOLOGY  [place]ENT [place] AND NEXT WEEK TO HER [facility] [place] [facility]  2017:  FINDINGS:  Uterus measures 8.6 x 4.5 x 6.0cm.  No focal abnormality.  The endometrial stripe is not thickened measuring 0.4cm.  Right ovary appears within normal limits, and measures 2.2 x 1.3 x 1.8 cm.  Left ovary was not confidently identified.   There is no evidence of free fluid within the pelvis.   Impression   Left ovary not confidently visualized.   Otherwise unremarkable examination. Uterus appears within normal limits.        LAST VISIT :   BTL FOR CONTRACEPTION.  MAMMO DUE AND ORDERED, PAP UP TO DATE   LAST VISIT 2014:  WAS ADVISED LAST YEAR RE REPEAT PAP THIS YEAR BUT IF NL WOULD  F/U IN 3 YRS. MAMMO DUE, SCHEDULED. DAUGHTER JUST GRADUATED, GOING TO [employer] IN THE FALL  Last visit 2013:  PAP ASCUS, HPV NEGATIVE - NEEDS REPEAT PAP IN 1 YEAR AT ANNUAL VISIT   42 y.o. female, , Patient's last menstrual period was 2013., presents for a routine exam and has no complaints.   REF MAMMO. PAP SUBMITTED AND DICUSSED 3YR SCREENING RECOMMENDATIONS          Past Medical History:   Diagnosis Date    History of ovarian cyst                 Past Surgical History:   Procedure Laterality Date    DILATION AND CURETTAGE OF UTERUS         x3    TUBAL LIGATION Bilateral 2008         MEDICATIONS AND ALLERGIES:        Current Outpatient Prescriptions:     tranexamic acid (LYSTEDA) 650 mg tablet, Take 2 tablets (1,300 mg total) by mouth 3 (three) times daily., Disp: 60 tablet, Rfl: 6     Review of patient's allergies indicates:  No Known Allergies           Family History   Problem Relation Age of Onset    Hypertension Mother      Mitral valve prolapse Mother      Diabetes Mellitus Paternal Grandfather      Heart attack Maternal Aunt      Breast cancer Neg Hx      Colon cancer Neg Hx      Ovarian cancer Neg Hx           Social History   Social History            Social History    Marital status:        Spouse name: N/A    Number of children: N/A    Years of education: N/A          Occupational History    Not on file.            Social History Main Topics    Smoking status: Never Smoker    Smokeless tobacco: Never Used    Alcohol use No    Drug use: No    Sexual activity: Yes       Partners: Male           Other Topics Concern    Not on file          Social History Narrative    No narrative on file            COMPREHENSIVE GYN HISTORY:  PAP History: Denies abnormal Paps.  Infection History: Denies STDs. Denies PID.  Benign History: Denies uterine fibroids. Denies ovarian cysts. Denies endometriosis. Denies other conditions.  Cancer History: Denies cervical cancer. Denies  "uterine cancer or hyperplasia. Denies ovarian cancer. Denies vulvar cancer or pre-cancer. Denies vaginal cancer or pre-cancer. Denies breast cancer. Denies colon cancer.  Sexual Activity History: Reports currently being sexually active  Menstrual History: Monthly, mild-moderate.  Contraception:bilateral tubal ligation     ROS:  GENERAL: No weight changes. No swelling. No fatigue. No fever.  CARDIOVASCULAR: No chest pain. No shortness of breath. No leg cramps.   NEUROLOGICAL: No headaches. No vision changes.  BREASTS: No pain. No lumps. No discharge.  ABDOMEN: No pain. No nausea. No vomiting. No diarrhea. No constipation.  REPRODUCTIVE: No abnormal bleeding.   VULVA: No pain. No lesions. No itching.  VAGINA: No relaxation. No itching. No odor. No discharge. No lesions.  URINARY: No incontinence. No nocturia. No frequency. No dysuria.     /70   Ht 5' 3" (1.6 m)   Wt 82.8 kg (182 lb 8.7 oz)   LMP 07/31/2017 (Exact Date)   BMI 32.34 kg/m²      PE:  APPEARANCE: Well nourished, well developed, in no acute distress.  AFFECT: WNL, alert and oriented x 3.  SKIN: No acne or hirsutism.  NECK: Neck symmetric, without masses or thyromegaly.  NODES: No inguinal, cervical, axillary or femoral lymph node enlargement.  CHEST: Good respiratory effort.   ABDOMEN: Soft. No tenderness or masses. No hepatosplenomegaly. No hernias.  BREASTS: Symmetrical, no skin changes, visible lesions, palpable masses or nipple discharge bilaterally.  PELVIC: External female genitalia without lesions.  Female hair distribution. Adequate perineal body, Normal urethral meatus. Vagina moist and well rugated without lesions or discharge.  No significant cystocele or rectocele present. Cervix pink without lesions, discharge or tenderness. Uterus is normal size, regular, mobile and nontender. Adnexa without masses or tenderness.  EXTREMITIES: No edema      A procedure-specific consent form was reviewed with the patient and signed.  The patient " seemed to understand the procedure and it potential complications.  She did not have further questions at the end of the visit.      PLAN:  Proceed with the scheduled procedure as planned.    FOLLOW-UP:  After hospitalization.  A postoperative visit 2-3 weeks following the procedure was advised and scheduled.

## 2017-12-28 NOTE — PLAN OF CARE
Nisha Rock Uribeiano has met all discharge criteria from Phase II. Vital Signs are stable, ambulating  without difficulty. Discharge instructions given, patient verbalized understanding. Discharged from facility via wheelchair in stable condition.

## 2017-12-28 NOTE — TRANSFER OF CARE
"Anesthesia Transfer of Care Note    Patient: Nisha Mei    Procedure(s) Performed: Procedure(s) (LRB):  ABLATION-ENDOMETRIAL NOVASURE / (N/A)  [id number] (N/A)    Patient location: Emergency Department    Anesthesia Type: general    Transport from OR: Transported from OR on room air with adequate spontaneous ventilation    Post pain: adequate analgesia    Post assessment: no apparent anesthetic complications    Post vital signs: stable    Level of consciousness: awake    Nausea/Vomiting: no nausea/vomiting    Complications: none    Transfer of care protocol was followed      Last vitals:   Visit Vitals  /78 (BP Location: Right arm, Patient Position: Lying)   Pulse 69   Temp 36.8 °C (98.2 °F) (Oral)   Resp 18   Ht 5' 2" (1.575 m)   Wt 74.8 kg (165 lb)   LMP 12/10/2017 (Approximate)   SpO2 99%   Breastfeeding? No   BMI 30.18 kg/m²     "

## 2017-12-28 NOTE — DISCHARGE INSTRUCTIONS
Home Care Instruction D&C Hysteroscopy             ACTIVITY LEVEL:  If you received sedation and/or an anesthetic, you may feel sleepy for several hours. Rest until you feel more  awake. Gradually resume your normal activities.    DIET:  At home, continue with liquids. If there is no nausea, you may eat a soft diet and gradually resume a regular diet.    BATHING:  You may shower  as desired in one day.  You should avoid tub baths, hot tubs and swimming pools until seen by your physician for a post-op follow up.    CARE:  You can expect watery or bloody vaginal discharge for several days. Do not use tampons, please only use pads. Sexual activity is restricted as advised by your doctor.    MEDICATIONS:  You will receive instructions for any pain and/or antibiotic prescriptions. Pain medication should be taken only if needed and as directed. Antibiotics, if ordered, should be taken as directed until the entire prescription is completed.    WHEN TO CALL THE DOCTOR:   For any heavy vaginal bleeding   Fever over 101°F (38.4°C)   Any lower abdominal pain not relieved by the pain mediation    FOR EMERGENCIES:  If any unusual problems or difficulties occur, contact Dr. Fowler or the resident at [phone] (page ) or the Clinic office, [phone].        Endometrial Ablation  Endometrial ablation is an outpatient surgery that can reduce or stop heavy menstrual bleeding. Ablation destroys the lining of the uterus. This surgery is for women who do not want to have any more children and who have not yet entered menopause. It should not be used by women with endometrial hyperplasia or cancer of the uterus.  Treatment takes less than an hour, and you can go home later that day.            Youll be given anesthesia so you stay comfortable and relaxed and feel no pain during surgery.  · Then, your uterus may be filled with fluid. This puts pressure on the lining to help reduce bleeding. It also allows your  health care provider to see inside your uterus.  · Next your health care provider puts a small telescope-like instrument through the cervix. This scope may be connected to a video monitor. This helps your health care provider see and control the ablation process. At the end of the scope, a device using heat, freezing, or electric current destroys the uterine lining. Instead of the scope, your health care provider may use a device that both expands and destroys the uterine lining. After being inserted into your uterus, it also uses heat or other energy to destroy the lining. Your health care provider will choose the device thats best for you.    Your recovery  · You may have cramping or aching in your abdomen after surgery. Your health care provider can give you pain medicine.  · You may also have a bloody or watery discharge or bleeding for days or weeks. Use sanitary pads, not tampons.  · Dont have sexual intercourse or play active sports for 2 weeks after surgery.  · You can likely return to work in 2 days.  · Ask your health care provider about using contraception after an ablation.  · Your health care provider will see you in about 6 weeks to be sure youre healing well.    Call your health care provider if you have any of the following after surgery:  · Persistent or increased abdominal pain  · Shortness of breath  · Heavy vaginal bleeding  · Fever over 100.4°F (38°C) or chills  · Nausea  · Frequent urination for 24 hours         Discharge Instructions: After Your Surgery  Youve just had surgery. During surgery, you were given medicine called anesthesia to keep you relaxed and free of pain. After surgery, you may have some pain or nausea. This is common. Here are some tips for feeling better and getting well after surgery.     Stay on schedule with your medicine.     Going home  Your healthcare provider will show you how to take care of yourself when you go home. He or she will also answer your questions.  Have an adult family member or friend drive you home. For the first 24 hours after your surgery:    · Do not drive or use heavy equipment.  · Do not make important decisions or sign legal papers.  · Do not drink alcohol.  · Have someone stay with you, if needed. He or she can watch for problems and help keep you safe.    Be sure to go to all follow-up visits with your healthcare provider. And rest after your surgery for as long as your healthcare provider tells you to.    Coping with pain  If you have pain after surgery, pain medicine will help you feel better. Take it as told, before pain becomes severe. Also, ask your healthcare provider or pharmacist about other ways to control pain. This might be with heat, ice, or relaxation. And follow any other instructions your surgeon or nurse gives you.    Tips for taking pain medicine  To get the best relief possible, remember these points:    · Pain medicines can upset your stomach. Taking them with a little food may help.  · Most pain relievers taken by mouth need at least 20 to 30 minutes to start to work.  · Taking medicine on a schedule can help you remember to take it. Try to time your medicine so that you can take it before starting an activity. This might be before you get dressed, go for a walk, or sit down for dinner.  · Constipation is a common side effect of pain medicines. Call your healthcare provider before taking any medicines such as laxatives or stool softeners to help ease constipation. Also ask if you should skip any foods. Drinking lots of fluids and eating foods such as fruits and vegetables that are high in fiber can also help. Remember, do not take laxatives unless your surgeon has prescribed them.  · Drinking alcohol and taking pain medicine can cause dizziness and slow your breathing. It can even be deadly. Do not drink alcohol while taking pain medicine.  · Pain medicine can make you react more slowly to things. Do not drive or run machinery  while taking pain medicine.    Your healthcare provider may tell you to take acetaminophen to help ease your pain. Ask him or her how much you are supposed to take each day. Acetaminophen or other pain relievers may interact with your prescription medicines or other over-the-counter (OTC) medicines. Some prescription medicines have acetaminophen and other ingredients. Using both prescription and OTC acetaminophen for pain can cause you to overdose. Read the labels on your OTC medicines with care. This will help you to clearly know the list of ingredients, how much to take, and any warnings. It may also help you not take too much acetaminophen. If you have questions or do not understand the information, ask your pharmacist or healthcare provider to explain it to you before you take the OTC medicine.    Managing nausea  Some people have an upset stomach after surgery. This is often because of anesthesia, pain, or pain medicine, or the stress of surgery. These tips will help you handle nausea and eat healthy foods as you get better. If you were on a special food plan before surgery, ask your healthcare provider if you should follow it while you get better. These tips may help:    · Do not push yourself to eat. Your body will tell you when to eat and how much.  · Start off with clear liquids and soup. They are easier to digest.  · Next try semi-solid foods, such as mashed potatoes, applesauce, and gelatin, as you feel ready.  · Slowly move to solid foods. Dont eat fatty, rich, or spicy foods at first.  · Do not force yourself to have 3 large meals a day. Instead eat smaller amounts more often.  · Take pain medicines with a small amount of solid food, such as crackers or toast, to avoid nausea.     Call your surgeon if  · You still have pain an hour after taking medicine. The medicine may not be strong enough.  · You feel too sleepy, dizzy, or groggy. The medicine may be too strong.  · You have side effects like  nausea, vomiting, or skin changes, such as rash, itching, or hives.       If you have obstructive sleep apnea  You were given anesthesia medicine during surgery to keep you comfortable and free of pain. After surgery, you may have more apnea spells because of this medicine and other medicines you were given. The spells may last longer than usual.   At home:    · Keep using the continuous positive airway pressure (CPAP) device when you sleep. Unless your healthcare provider tells you not to, use it when you sleep, day or night. CPAP is a common device used to treat obstructive sleep apnea.  · Talk with your provider before taking any pain medicine, muscle relaxants, or sedatives. Your provider will tell you about the possible dangers of taking these medicines.    © 0973-0173 [employer]. [address] [place] [address], [place] [address]. All rights reserved. This information is not intended as a substitute for professional medical care. Always follow your healthcare professional's instructions.    PLEASE FOLLOW ANY OTHER INSTRUCTIONS PROVIDED TO YOU BY DR. PRATHER!

## 2017-12-28 NOTE — ANESTHESIA POSTPROCEDURE EVALUATION
"Anesthesia Post Evaluation    Patient: Nisha Mei    Procedure(s) Performed: Procedure(s) (LRB):  ABLATION-ENDOMETRIAL NOVASURE / (N/A)  [id number] (N/A)    Final Anesthesia Type: general  Patient location during evaluation: PACU  Patient participation: Yes- Able to Participate  Level of consciousness: awake and alert  Post-procedure vital signs: reviewed and stable  Pain management: adequate  Airway patency: patent  PONV status at discharge: No PONV  Anesthetic complications: no      Cardiovascular status: blood pressure returned to baseline  Respiratory status: unassisted, spontaneous ventilation and room air  Hydration status: euvolemic  Follow-up not needed.        Visit Vitals  /72   Pulse (!) 59   Temp 36.6 °C (97.8 °F) (Oral)   Resp 18   Ht 5' 2" (1.575 m)   Wt 74.8 kg (165 lb)   LMP 12/10/2017 (Approximate)   SpO2 98%   Breastfeeding? No   BMI 30.18 kg/m²       Pain/Lucy Score: Pain Assessment Performed: Yes (12/28/2017 10:30 AM)  Presence of Pain: denies (12/28/2017 10:30 AM)  Lucy Score: 10 (12/28/2017 10:30 AM)      "

## 2017-12-28 NOTE — BRIEF OP NOTE
[facility]  Brief Operative Note     SUMMARY     Surgery Date: 12/28/2017     Surgeon(s) and Role:     * Mary Fowler MD - Primary    Assisting Surgeon: Sushma K. Reddy, MD - PGY-1    Pre-op Diagnosis:  Abnormal uterine bleeding (AUB) [N93.9]    Post-op Diagnosis:  Post-Op Diagnosis Codes:     * Abnormal uterine bleeding (AUB) [N93.9]    Procedure(s) (LRB):  ABLATION-ENDOMETRIAL NOVASURE / (N/A)  [id number] (N/A)    Anesthesia: General    EBL: minimal (< 5 cc)      IVF: 400 cc     Urine Output: 150 cc     H-scope Fluid Deficit: 140 cc (250 cc NS in)      Cavity Width: 4.7 cm  Cavity Length: 6.5 cm  Power: 168 W  Time: 1:34      Specimen: 1. Endometrial curettings     Findings:   1. Normal vulva and introitus  2. Uterus sounded to 10 cm with cervical length of 3.5 cm  3. Upon hysteroscopic inspection, no anatomical abnormalities were noted.   4. The endometrial cavity was ablated using the Novasure device  5. Post-ablation assessment revealed thorough ablation of endometrial cavity and no remaining bleeding sites.         Specimens:   Specimen (12h ago through future)    Start     Ordered    12/28/17 0949  Specimen to Pathology - Surgery  Once     [place]  [facility] [place] 0949          Discharge Note    SUMMARY     Admit Date: 12/28/2017    Discharge Date and Time:  12/28/2017 10:18 AM    Hospital Course   Patient presented for scheduled procedure. Patient was brought to OR for scheduled hysteroscopy D&C and endometrial ablation. Please see OP note for further details. Tolerated procedure well and patient was taken to recovery in a stable condition. Prior to discharge patient was able to void, ambulate, tolerate PO and pain was well controlled with PO meds. Patient was given routine post-op instructions for which patient voiced understanding. Patient was subsequently discharged home.    Final Diagnosis: Post-Op Diagnosis Codes:     * Abnormal uterine bleeding (AUB)  [N93.9]    Disposition: Home or Self Care    Follow Up/Patient Instructions:     Medications:  Reconciled Home Medications:   Current Discharge Medication List      START taking these medications    Details   ibuprofen (ADVIL,MOTRIN) 600 MG tablet Take 1 tablet (600 mg total) by mouth 3 (three) times daily.  Qty: 30 tablet, Refills: 1      oxyCODONE-acetaminophen (PERCOCET) 5-325 mg per tablet Take 1 tablet by mouth every 4 (four) hours as needed for Pain.  Qty: 10 tablet, Refills: 0         CONTINUE these medications which have NOT CHANGED    Details   scopolamine (TRANSDERM-SCOP) 1.3-1.5 mg (1 mg over 3 days) Place 1 patch onto the skin every 72 hours.  Qty: 1 patch, Refills: 0             Discharge Procedure Orders  Notify your health care provider if you experience any of the following:  persistent nausea and vomiting or diarrhea     Notify your health care provider if you experience any of the following:  temperature >100.4     Notify your health care provider if you experience any of the following:  severe uncontrolled pain     Other restrictions (specify):   Order Comments: 1-2 weeks of pelvic rest - nothing in the vagina for 1-2 weeks     Notify your health care provider if you experience any of the following:   Order Comments: Vaginal bleeding soaking 1-2 pads per hour for 2 or more hours

## 2017-12-28 NOTE — OP NOTE
Operative Report     Procedure:  1. Hysteroscopy  2. Dilation and Curettage  3. Endometrial ablation     Date of Surgery: 12/28/2017     Surgeon: Mary Fowler MD     Assistant: Sushma K Reddy, MD - PGY-1     Pre-Op Diagnosis:   1. Abnormal uterine bleeding    Post-op Diagnosis:  same     EBL: minimal (< 5 cc)      IVF: 400 cc     Urine Output: 150 cc     H-scope Fluid Deficit: 140 cc (250 cc NS in)      Cavity Width: 4.7 cm  Cavity Length: 6.5 cm  Power: 168 W  Time: 1:34      Specimen: 1. Endometrial curettings     Findings:   1. Normal vulva and introitus  2. Uterus sounded to 10 cm with cervical length of 3.5 cm  3. Upon hysteroscopic inspection, no anatomical abnormalities were noted.   4. The endometrial cavity was ablated using the Novasure device  5. Post-ablation assessment revealed thorough ablation of endometrial cavity and no evident viable endometrium.     Procedure in Detail:  The patient was taken to the Operating Room where general anesthesia was obtained, the patient was placed in the lithotomy position, with her legs in the Yellow fin stirrups. The patient was then prepped and draped in the normal sterile fashion and her bladder was drained using in-and-out catheterization. A timeout was called verifying the patient's name, date of birth, procedure to be performed, and allergies. Right angle retractors were placed in the anterior and posterior aspects of the vagina and the cervix was visualized. A single tooth tenaculum was placed on the anterior lip of the cervix. The uterus was sounded to 10 cm and found to be adequately dilated to allow the 5mm hysteroscope. The hysteroscope was then introduced in to the uterine cavity and uterine anatomy was assessed to be normal with both ostea visualized. The internal cervical os was visualized and the hysteroscope was used to measure the length of the cervical canal. Sharp curettage was performed to remove endometrial tissue for sampling. The cavity  length was applied to the Novasure system and the Novasure was then introduced to the endometrial cavity, pulled back 0.5 cm from the fundus, and deployed. The cavity width was measured and the measurements were applied to the Novasure system. The seal was tested and found to be adequate. The system was then turned on.  Following removal of the Novasure device, the cavity was again inspected with a thorough burn noted. The cervix was not burned.   The single tooth tenaculum was then removed and the cervix was noted to be hemostatic.   Sponge, lap and needle counts were correct x 2.  The patient tolerated the procedure well and was taken to recovery in stable condition.     Sushma K. Reddy, MD I WAS SCRUBBED AND PRESENT THROUGHOUT THE PROCEDURE

## 2017-12-28 NOTE — PROGRESS NOTES
Pt denies any pain or discomfort at this time.   Pt on room air @ 100.   Pt is resting appears to be comfortable.   Vital signs are within normal limits.

## 2017-12-28 NOTE — OR NURSING
Reviewed 's hysteroscopy dilation curettage ablation discharge instructions, prior to surgery, with verbalized understanding per patient and family.

## 2018-01-11 ENCOUNTER — PATIENT MESSAGE (OUTPATIENT)
Dept: OBSTETRICS AND GYNECOLOGY | Facility: HOSPITAL | Age: 48
End: 2018-01-11

## 2018-01-19 ENCOUNTER — PATIENT MESSAGE (OUTPATIENT)
Dept: OBSTETRICS AND GYNECOLOGY | Facility: CLINIC | Age: 48
End: 2018-01-19

## 2018-01-24 ENCOUNTER — OFFICE VISIT (OUTPATIENT)
Dept: OBSTETRICS AND GYNECOLOGY | Facility: CLINIC | Age: 48
End: 2018-01-24
Payer: COMMERCIAL

## 2018-01-24 VITALS
BODY MASS INDEX: 32.86 KG/M2 | HEIGHT: 62 IN | SYSTOLIC BLOOD PRESSURE: 118 MMHG | DIASTOLIC BLOOD PRESSURE: 72 MMHG | WEIGHT: 178.56 LBS

## 2018-01-24 DIAGNOSIS — Z09 POSTOP CHECK: Primary | ICD-10-CM

## 2018-01-24 PROCEDURE — 99024 POSTOP FOLLOW-UP VISIT: CPT | Mod: S$GLB,,, | Performed by: OBSTETRICS & GYNECOLOGY

## 2018-01-24 PROCEDURE — 99999 PR PBB SHADOW E&M-EST. PATIENT-LVL II: CPT | Mod: PBBFAC,,, | Performed by: OBSTETRICS & GYNECOLOGY

## 2018-01-24 RX ORDER — NORETHINDRONE ACETATE AND ETHINYL ESTRADIOL .02; 1 MG/1; MG/1
1 TABLET ORAL DAILY
Qty: 28 TABLET | Refills: 2 | Status: SHIPPED | OUTPATIENT
Start: 2018-01-24 | End: 2018-10-10

## 2018-01-24 RX ORDER — DOXYCYCLINE 100 MG/1
100 CAPSULE ORAL EVERY 12 HOURS
Qty: 14 CAPSULE | Refills: 1 | Status: SHIPPED | OUTPATIENT
Start: 2018-01-24 | End: 2018-01-31

## 2018-01-24 NOTE — PROGRESS NOTES
POSTOPERATIVE FOLLOW-UP:    The patient presents today following HYSTYEROSCOPY EM ABLATION 12/28/2017.  The procedure and the hospital course were uncomplicated. HAS HAD RED BLEEDING THAT HAS CONTINUED ALMOST 4 WEEKS POSTOP.  NO FEVER, PURULENT DISCHARGE.  POSS SUBCLINICAL EMITIS ALTHOUGH EXAM BENIGN - DOXY AND CYCLE 2 MONTHS OCP TO SEE IF CYCLE CONTROL CAN BE REESTABLISHED    The pathology revealed:  FINAL PATHOLOGIC DIAGNOSIS  ENDOMETRIUM: FRAGMENTS OF SECRETORY ENDOMETRIUM; NO EVIDENCE OF ENDOMETRIAL  HYPERPLASIA OR MALIGNANCY.      PHYSICAL EXAM:  Appearance: Well developed, well nourished, in no acute distress.  Skin: Normal turgor and no visible lesions.  Abdomen:  Soft, non tender, non distended, without hepatosplenomegaly, No masses appreciated.    Pelvic: Normal female external genitalia without lesions.  Normal hair distribution.  Adequate perineal body and normal urethral meatus.  Vagina moist and well-ruga barby, SMALL AMT DK BLOOD.  Cervix pink and without lesions.  No significant cystocele or rectocele.  Bimanual examination shows uterus normal size, normal position, regular shape, mobile, and non tender.    ASSESSMENT:  Doing well following her procedure.  She is following the anticipated course of recovery, and was advised regarding future wound care, activity, and need for follow up.      PLAN:  Follow up next routine visit.  The patient is advised to call if she has fever greater than 101.0F, increased bleeding/discharge, new-onset or increased pain, deviation from the anticipated subsequent course of recovery, questions, or concerns.

## 2018-02-01 ENCOUNTER — PATIENT MESSAGE (OUTPATIENT)
Dept: OBSTETRICS AND GYNECOLOGY | Facility: CLINIC | Age: 48
End: 2018-02-01

## 2018-02-03 ENCOUNTER — OFFICE VISIT (OUTPATIENT)
Dept: URGENT CARE | Facility: CLINIC | Age: 48
End: 2018-02-03
Payer: COMMERCIAL

## 2018-02-03 VITALS
SYSTOLIC BLOOD PRESSURE: 136 MMHG | WEIGHT: 165 LBS | HEART RATE: 53 BPM | DIASTOLIC BLOOD PRESSURE: 82 MMHG | OXYGEN SATURATION: 98 % | HEIGHT: 62 IN | BODY MASS INDEX: 30.36 KG/M2 | TEMPERATURE: 99 F

## 2018-02-03 DIAGNOSIS — R05.9 COUGH IN ADULT: Primary | ICD-10-CM

## 2018-02-03 DIAGNOSIS — B34.9 VIRAL SYNDROME: ICD-10-CM

## 2018-02-03 LAB
CTP QC/QA: YES
FLUAV AG NPH QL: NEGATIVE
FLUBV AG NPH QL: NEGATIVE

## 2018-02-03 PROCEDURE — 99213 OFFICE O/P EST LOW 20 MIN: CPT | Mod: S$GLB,,, | Performed by: FAMILY MEDICINE

## 2018-02-03 PROCEDURE — 87804 INFLUENZA ASSAY W/OPTIC: CPT | Mod: QW,S$GLB,, | Performed by: FAMILY MEDICINE

## 2018-02-03 RX ORDER — OSELTAMIVIR PHOSPHATE 75 MG/1
75 CAPSULE ORAL 2 TIMES DAILY
Qty: 10 CAPSULE | Refills: 0 | Status: SHIPPED | OUTPATIENT
Start: 2018-02-03 | End: 2018-02-08

## 2018-02-03 NOTE — PROGRESS NOTES
"Subjective:       Patient ID: Nisha Mei is a 47 y.o. female.    Vitals:  height is 5' 2" (1.575 m) and weight is 74.8 kg (165 lb). Her tympanic temperature is 98.9 °F (37.2 °C). Her blood pressure is 136/82 and her pulse is 53 (abnormal). Her oxygen saturation is 98%.     Chief Complaint: Cough    Cough   This is a new problem. The current episode started 1 to 4 weeks ago. The problem has been gradually worsening. The cough is non-productive. Pertinent negatives include no chest pain, chills, ear pain, eye redness, fever, headaches, myalgias, sore throat, shortness of breath or wheezing. She has tried nothing for the symptoms.     Review of Systems   Constitution: Negative for chills, fever and malaise/fatigue.   HENT: Positive for hoarse voice. Negative for congestion, ear pain and sore throat.    Eyes: Negative for discharge and redness.   Cardiovascular: Negative for chest pain, dyspnea on exertion and leg swelling.   Respiratory: Positive for cough. Negative for shortness of breath and wheezing.    Musculoskeletal: Negative for myalgias.   Gastrointestinal: Positive for nausea. Negative for abdominal pain.   Neurological: Negative for headaches.       Objective:      Physical Exam   Constitutional: She is oriented to person, place, and time. She appears well-developed and well-nourished. She is cooperative.  Non-toxic appearance. She does not appear ill. No distress.   HENT:   Head: Normocephalic and atraumatic.   Right Ear: Hearing, tympanic membrane, external ear and ear canal normal.   Left Ear: Hearing, tympanic membrane, external ear and ear canal normal.   Nose: Nose normal. No mucosal edema, rhinorrhea or nasal deformity. No epistaxis. Right sinus exhibits no maxillary sinus tenderness and no frontal sinus tenderness. Left sinus exhibits no maxillary sinus tenderness and no frontal sinus tenderness.   Mouth/Throat: Uvula is midline, oropharynx is clear and moist and mucous membranes are " normal. No trismus in the jaw. Normal dentition. No uvula swelling. No posterior oropharyngeal erythema.   Eyes: Conjunctivae and lids are normal. Right eye exhibits no discharge. Left eye exhibits no discharge. No scleral icterus.   Sclera clear bilat   Neck: Trachea normal, normal range of motion, full passive range of motion without pain and phonation normal. Neck supple.   Cardiovascular: Normal rate, regular rhythm, normal heart sounds, intact distal pulses and normal pulses.    Pulmonary/Chest: Effort normal and breath sounds normal. She has no wheezes. She has no rales. She exhibits no tenderness.   Abdominal: Soft. Normal appearance and bowel sounds are normal. She exhibits no distension, no pulsatile midline mass and no mass. There is no tenderness.   Musculoskeletal: Normal range of motion. She exhibits no edema or deformity.   Lymphadenopathy:     She has no cervical adenopathy.   Neurological: She is alert and oriented to person, place, and time. She exhibits normal muscle tone. Coordination normal.   Skin: Skin is warm, dry and intact. She is not diaphoretic. No pallor.   Psychiatric: She has a normal mood and affect. Her speech is normal and behavior is normal. Judgment and thought content normal. Cognition and memory are normal.   Nursing note and vitals reviewed.      Assessment:       1. Cough in adult    2. Viral syndrome        Plan:         Cough in adult  -     POCT Influenza A/B    Viral syndrome  -     oseltamivir (TAMIFLU) 75 MG capsule; Take 1 capsule (75 mg total) by mouth 2 (two) times daily.  Dispense: 10 capsule; Refill: 0

## 2018-04-10 ENCOUNTER — OFFICE VISIT (OUTPATIENT)
Dept: DERMATOLOGY | Facility: CLINIC | Age: 48
End: 2018-04-10
Payer: COMMERCIAL

## 2018-04-10 VITALS — WEIGHT: 165 LBS | BODY MASS INDEX: 30.18 KG/M2

## 2018-04-10 DIAGNOSIS — Z87.898 HISTORY OF ATYPICAL NEVUS: ICD-10-CM

## 2018-04-10 DIAGNOSIS — L82.1 SK (SEBORRHEIC KERATOSIS): ICD-10-CM

## 2018-04-10 DIAGNOSIS — L81.4 LENTIGINES: ICD-10-CM

## 2018-04-10 DIAGNOSIS — D22.9 NEVUS OF MULTIPLE SITES: Primary | ICD-10-CM

## 2018-04-10 PROCEDURE — 99999 PR PBB SHADOW E&M-EST. PATIENT-LVL III: CPT | Mod: PBBFAC,,, | Performed by: DERMATOLOGY

## 2018-04-10 PROCEDURE — 99202 OFFICE O/P NEW SF 15 MIN: CPT | Mod: S$GLB,,, | Performed by: DERMATOLOGY

## 2018-04-10 NOTE — PROGRESS NOTES
Subjective:       Patient ID:  Nisha Mei is a 47 y.o. female who presents for   Chief Complaint   Patient presents with    Mole     mole    Skin Check     UBSE     History of Present Illness: The patient presents with chief complaint of spot .  Location: back  Duration: mnths  Signs/Symptoms: peeled     Prior treatments: none          Review of Systems   Constitutional: Negative for fever.   Skin: Negative for itching and rash.   Hematologic/Lymphatic: Does not bruise/bleed easily.        Objective:    Physical Exam   Constitutional: She appears well-developed and well-nourished. No distress.   Neurological: She is alert and oriented to person, place, and time. She is not disoriented.   Psychiatric: She has a normal mood and affect.   Skin:   Areas Examined (abnormalities noted in diagram):   Head / Face Inspection Performed  Neck Inspection Performed  Chest / Axilla Inspection Performed  Back Inspection Performed  RUE Inspected  LUE Inspection Performed              Diagram Legend     Erythematous scaling macule/papule c/w actinic keratosis       Vascular papule c/w angioma      Pigmented verrucoid papule/plaque c/w seborrheic keratosis      Yellow umbilicated papule c/w sebaceous hyperplasia      Irregularly shaped tan macule c/w lentigo     1-2 mm smooth white papules consistent with Milia      Movable subcutaneous cyst with punctum c/w epidermal inclusion cyst      Subcutaneous movable cyst c/w pilar cyst      Firm pink to brown papule c/w dermatofibroma      Pedunculated fleshy papule(s) c/w skin tag(s)      Evenly pigmented macule c/w junctional nevus     Mildly variegated pigmented, slightly irregular-bordered macule c/w mildly atypical nevus      Flesh colored to evenly pigmented papule c/w intradermal nevus       Pink pearly papule/plaque c/w basal cell carcinoma      Erythematous hyperkeratotic cursted plaque c/w SCC      Surgical scar with no sign of skin cancer recurrence      Open and  "closed comedones      Inflammatory papules and pustules      Verrucoid papule consistent consistent with wart     Erythematous eczematous patches and plaques     Dystrophic onycholytic nail with subungual debris c/w onychomycosis     Umbilicated papule    Erythematous-base heme-crusted tan verrucoid plaque consistent with inflamed seborrheic keratosis     Erythematous Silvery Scaling Plaque c/w Psoriasis     See annotation      Assessment / Plan:        Nevus of multiple sites  The "ABCD" rules to observe pigmented lesions were reviewed.  Brochure provided      Lentigines  The "ABCD" rules to observe pigmented lesions were reviewed.  sunscreen    History of atypical nevus  Comments:  right shoulder 2011 mild atypia  Seborrheic keratosis  reassurance             Follow-up in about 1 year (around 4/10/2019).  "

## 2018-09-27 ENCOUNTER — PATIENT MESSAGE (OUTPATIENT)
Dept: FAMILY MEDICINE | Facility: CLINIC | Age: 48
End: 2018-09-27

## 2018-09-27 ENCOUNTER — TELEPHONE (OUTPATIENT)
Dept: FAMILY MEDICINE | Facility: CLINIC | Age: 48
End: 2018-09-27

## 2018-09-27 NOTE — TELEPHONE ENCOUNTER
----- Message from Attila Barfield MA sent at 9/27/2018 10:13 AM CDT -----  Contact: Pt  Message     ----- Message from [facility], System Message sent at 9/26/2018  2:13 PM CDT -----    Appointment Request From: Nisha Mei    With Provider: Katerina Farrell    Preferred Date Range: 9/26/2018 - 12/31/2018    Preferred Times: Any time    Reason for visit: Annual Visit    Comments:  I am looking to see a primary md to get routine labs drawn and yearly check up

## 2018-10-02 ENCOUNTER — TELEPHONE (OUTPATIENT)
Dept: OBSTETRICS AND GYNECOLOGY | Facility: CLINIC | Age: 48
End: 2018-10-02

## 2018-10-02 DIAGNOSIS — Z12.31 VISIT FOR SCREENING MAMMOGRAM: Primary | ICD-10-CM

## 2018-10-02 NOTE — TELEPHONE ENCOUNTER
----- Message from Chandni Bosch sent at 10/2/2018  1:19 PM CDT -----  Contact: self   Pt is requesting mmg orders. The pt can be reached at [phone].

## 2018-10-02 NOTE — TELEPHONE ENCOUNTER
----- Message from Chandni Bosch sent at 10/2/2018  1:19 PM CDT -----  Contact: self   Pt is requesting mmg orders. The pt can be reached at [phone].

## 2018-10-03 ENCOUNTER — HOSPITAL ENCOUNTER (OUTPATIENT)
Dept: RADIOLOGY | Facility: HOSPITAL | Age: 48
Discharge: HOME | End: 2018-10-03
Attending: OBSTETRICS & GYNECOLOGY
Payer: COMMERCIAL

## 2018-10-03 VITALS — HEIGHT: 62 IN | WEIGHT: 165 LBS | BODY MASS INDEX: 30.36 KG/M2

## 2018-10-03 DIAGNOSIS — Z12.31 VISIT FOR SCREENING MAMMOGRAM: ICD-10-CM

## 2018-10-03 PROCEDURE — 77063 BREAST TOMOSYNTHESIS BI: CPT | Mod: 26,,, | Performed by: RADIOLOGY

## 2018-10-03 PROCEDURE — 77067 SCR MAMMO BI INCL CAD: CPT | Mod: 26,,, | Performed by: RADIOLOGY

## 2018-10-03 PROCEDURE — 77067 SCR MAMMO BI INCL CAD: CPT | Mod: TC,PO

## 2018-10-03 PROCEDURE — 77063 BREAST TOMOSYNTHESIS BI: CPT | Mod: TC,PO

## 2018-10-10 ENCOUNTER — LAB VISIT (OUTPATIENT)
Dept: LAB | Facility: HOSPITAL | Age: 48
End: 2018-10-10
Attending: INTERNAL MEDICINE
Payer: COMMERCIAL

## 2018-10-10 ENCOUNTER — OFFICE VISIT (OUTPATIENT)
Dept: INTERNAL MEDICINE | Facility: CLINIC | Age: 48
End: 2018-10-10
Payer: COMMERCIAL

## 2018-10-10 VITALS
DIASTOLIC BLOOD PRESSURE: 70 MMHG | TEMPERATURE: 98 F | HEART RATE: 66 BPM | WEIGHT: 179.44 LBS | HEIGHT: 62 IN | OXYGEN SATURATION: 98 % | SYSTOLIC BLOOD PRESSURE: 126 MMHG | BODY MASS INDEX: 33.02 KG/M2

## 2018-10-10 DIAGNOSIS — Z00.00 ANNUAL PHYSICAL EXAM: Primary | ICD-10-CM

## 2018-10-10 DIAGNOSIS — E66.9 OBESITY (BMI 30-39.9): ICD-10-CM

## 2018-10-10 DIAGNOSIS — Z76.89 ENCOUNTER TO ESTABLISH CARE: ICD-10-CM

## 2018-10-10 DIAGNOSIS — Z00.00 ANNUAL PHYSICAL EXAM: ICD-10-CM

## 2018-10-10 LAB
ALBUMIN SERPL BCP-MCNC: 3.9 G/DL
ALP SERPL-CCNC: 61 U/L
ALT SERPL W/O P-5'-P-CCNC: 15 U/L
ANION GAP SERPL CALC-SCNC: 11 MMOL/L
AST SERPL-CCNC: 20 U/L
BASOPHILS # BLD AUTO: 0.04 K/UL
BASOPHILS NFR BLD: 0.7 %
BILIRUB SERPL-MCNC: 0.7 MG/DL
BUN SERPL-MCNC: 11 MG/DL
CALCIUM SERPL-MCNC: 9.4 MG/DL
CHLORIDE SERPL-SCNC: 105 MMOL/L
CHOLEST SERPL-MCNC: 203 MG/DL
CHOLEST/HDLC SERPL: 2.9 {RATIO}
CO2 SERPL-SCNC: 22 MMOL/L
CREAT SERPL-MCNC: 0.8 MG/DL
DIFFERENTIAL METHOD: ABNORMAL
EOSINOPHIL # BLD AUTO: 0.2 K/UL
EOSINOPHIL NFR BLD: 3.3 %
ERYTHROCYTE [DISTWIDTH] IN BLOOD BY AUTOMATED COUNT: 12.9 %
EST. GFR  (AFRICAN AMERICAN): >60 ML/MIN/1.73 M^2
EST. GFR  (NON AFRICAN AMERICAN): >60 ML/MIN/1.73 M^2
GLUCOSE SERPL-MCNC: 89 MG/DL
HCT VFR BLD AUTO: 40.4 %
HDLC SERPL-MCNC: 69 MG/DL
HDLC SERPL: 34 %
HGB BLD-MCNC: 12.8 G/DL
IMM GRANULOCYTES # BLD AUTO: 0.01 K/UL
IMM GRANULOCYTES NFR BLD AUTO: 0.2 %
LDLC SERPL CALC-MCNC: 110.8 MG/DL
LYMPHOCYTES # BLD AUTO: 2 K/UL
LYMPHOCYTES NFR BLD: 37.4 %
MCH RBC QN AUTO: 29 PG
MCHC RBC AUTO-ENTMCNC: 31.7 G/DL
MCV RBC AUTO: 92 FL
MONOCYTES # BLD AUTO: 0.4 K/UL
MONOCYTES NFR BLD: 7.8 %
NEUTROPHILS # BLD AUTO: 2.7 K/UL
NEUTROPHILS NFR BLD: 50.6 %
NONHDLC SERPL-MCNC: 134 MG/DL
NRBC BLD-RTO: 0 /100 WBC
PLATELET # BLD AUTO: 314 K/UL
PMV BLD AUTO: 12 FL
POTASSIUM SERPL-SCNC: 4.1 MMOL/L
PROT SERPL-MCNC: 7.2 G/DL
RBC # BLD AUTO: 4.41 M/UL
SODIUM SERPL-SCNC: 138 MMOL/L
TRIGL SERPL-MCNC: 116 MG/DL
WBC # BLD AUTO: 5.38 K/UL

## 2018-10-10 PROCEDURE — 99999 PR PBB SHADOW E&M-EST. PATIENT-LVL III: CPT | Mod: PBBFAC,,, | Performed by: INTERNAL MEDICINE

## 2018-10-10 PROCEDURE — 85025 COMPLETE CBC W/AUTO DIFF WBC: CPT

## 2018-10-10 PROCEDURE — 83036 HEMOGLOBIN GLYCOSYLATED A1C: CPT

## 2018-10-10 PROCEDURE — 80053 COMPREHEN METABOLIC PANEL: CPT

## 2018-10-10 PROCEDURE — 80061 LIPID PANEL: CPT

## 2018-10-10 PROCEDURE — 99386 PREV VISIT NEW AGE 40-64: CPT | Mod: S$GLB,,, | Performed by: INTERNAL MEDICINE

## 2018-10-10 PROCEDURE — 36415 COLL VENOUS BLD VENIPUNCTURE: CPT | Mod: PO

## 2018-10-10 NOTE — PROGRESS NOTES
Subjective:       Patient ID: Nisha Mei is a 47 y.o. female.    Chief Complaint: [facility]    HPI Mrs. Mei is a 47-year-old female with a history of ovarian cyst, tubal ligation, D and C of the uterus, and endometrial ablation who presents to [facility] and for annual exam.  Patient also reports that few months ago she went to [place] and for about 2 weeks afterwards she noted that she had some elevated blood pressure in the range of 130s/80s.  She also has some facial flushing at that time.  She felt some pressure in her head.  Her blood pressure has since gone down into a normal range.  However she still occasionally has a sensation of flushing and red face.  She also has associated symptoms of runny nose and nasal congestion. She denies any associated sore throat or ear pain.  She has never been on any allergy medication in the past.  Her daughter is currently being treated with allergy medications however.  I recommended to the patient that she start a daily allergy medication such as Claritin, Zyrtec, or Allegra.  She should also begin a daily nasal spray such as Flonase or Nasonex.  She should use these daily for at least 1 month before deciding if they have helped her symptoms or not.  If she has tried these over-the-counter treatments and not seen a improvement, she should follow up with me in clinic.    Health maintenance:  Patient is up-to-date with Pap smear and mammogram.  She received her flu shot and tetanus vaccine at the [employer] [facility].  She is employed by [employer] as a nurse.    Review of Systems   HENT: Positive for congestion and rhinorrhea. Negative for ear pain and sore throat.         Facial flushing   Neurological: Positive for headaches (frontal).       Objective:      Physical Exam   Constitutional: She is oriented to person, place, and time. She appears well-developed and well-nourished.   HENT:   Head: Normocephalic and atraumatic.   Right Ear: Tympanic  membrane and external ear normal.   Left Ear: Tympanic membrane and external ear normal.   Nose: Nose normal.   Mouth/Throat: Oropharynx is clear and moist. No oropharyngeal exudate.   Erythematous canals bilaterally. No signs of infection.   Eyes: Conjunctivae and EOM are normal.   Neck: Neck supple. No tracheal deviation present. No thyromegaly present.   Cardiovascular: Normal rate, regular rhythm, normal heart sounds and intact distal pulses. Exam reveals no gallop and no friction rub.   No murmur heard.  Pulmonary/Chest: Effort normal and breath sounds normal. No stridor. No respiratory distress. She has no wheezes. She has no rales.   Abdominal: Soft. Bowel sounds are normal. She exhibits no distension and no mass. There is no tenderness. There is no rebound and no guarding.   Lymphadenopathy:     She has no cervical adenopathy.   Neurological: She is alert and oriented to person, place, and time.   Skin: Skin is warm and dry.   Psychiatric: She has a normal mood and affect. Her behavior is normal. Judgment and thought content normal.   Vitals reviewed.      Assessment:       1. Annual physical exam    2. Encounter to establish care    3. Obesity (BMI 30-39.9)        Plan:     1.  Annual exam  CMP, CBC, lipids, A1c  Patient up-to-date with Pap smear and mammogram  Patient received influenza vaccine and tetanus vaccine at [facility].    2.  Obesity  The patient's BMI has been recorded in the chart. The patient has been provided educational materials regarding the benefits of attaining and maintaining a normal weight. We will continue to address and follow this issue during follow up visits.    Return to clinic in 1 yr and PRN

## 2018-10-10 NOTE — PATIENT INSTRUCTIONS
Low-Fat Diet    A low-fat diet will help you lose weight. It also can lower cholesterol and prevent symptoms of gallbladder disease. The average American diet contains up to 50% fat. This means that 50% of all calories come from fat (about 80 grams to 100 grams of fat per day). Choosing normal portions of foods from the list below can help lower your fat intake. Experts recommend that only 20% to 35% of your daily calories come from fat. The remaining 65% to 80% of calories will come from protein and carbohydrates. This is much healthier for you.  Breads  Ok: Whole-wheat or rye bread, anna or soda crackers, jeramy toast, plain rolls, bagels, English muffins  Avoid: Rolls and breads containing whole milk or egg; waffles, pancakes, biscuits, corn bread; cheese crackers, other flavored crackers, pastries, doughnuts  Cereals  Ok: Oatmeal, whole-wheat, bran, multigrain, rice  Avoid: Granola or other cereals that have oil, coconut, or more than 2 grams of fat per serving  Cheese and eggs  Ok: Cheeses labeled low-fat; 3 whole eggs per week; egg whites and egg substitutes as desired  Avoid: All other cheeses  Desserts  Ok: Gelatin, slushy, noris food cake, meringues, nonfat yogurt, and puddings or sherbet made with nonfat milk  Avoid: Any other store-bought desserts, or desserts that have fat, whole milk, cream, chocolate, and coconut  Drinks  Ok: Nonfat milk, coffee, tea, fizzy (carbonated) drinks  Avoid: Whole and reduced-fat milk, evaporated and condensed milk, hot chocolate mixes, milk shakes, malts, eggnog  Fats  Ok: You may have up to 3 teaspoons of fat daily. This can be butter, margarine, mayonnaise, or healthy oils (canola or olive)  Avoid: Cream, nondairy creams, cream cheese, gravies, and cream sauces  Fruits  Ok: All fruits made without fat  Avoid: Coconut, olives  Meats, poultry, fish  Ok: Limit meat to 6 ounces daily (broiled, roasted, baked, grilled, or boiled). Buy lean cuts, and trim off the fat. Try  beef, fish, lamb, pork, and canned fish packed in water; also chicken and turkey with the skin removed.  Avoid: Fried meats, fish, or poultry; fried eggs, and fish canned in oils; fatty meats such as eldridge, sausage, corned beef, hot dogs, and lunch meats; meats with gravies and sauces  Potatoes, beans, pasta  Ok: Dried beans, split peas, lentils, potatoes, rice, pasta made without added fat  Avoid: French fries, potato chips, potatoes prepared with butter, refried beans  Soups  Ok: Clear broth soups without fat and with allowed vegetables  Avoid: Cream-based soups  Vegetables  Ok: Fresh, frozen, canned or dried vegetables, all made without added fat  Avoid: Fried vegetables and those prepared with butter, cream, sauces  Miscellaneous  Ok: Salt, sugar, jelly, hard candy, marshmallows, honey, syrup, spices and herbs, mustard, ketchup, lemon, and vinegar. Try to limit sweets and added sugars.  Avoid: Chocolate, nuts, coconut, and cream candies; sunflower, sesame, and other seeds; fried foods; cream sauces and gravies; pizza  Date Last Reviewed: 8/1/2016 © 2000-2017 [employer]. [address] [place] [address]. All rights reserved. This information is not intended as a substitute for professional medical care. Always follow your healthcare professional's instructions.        Finding Your Ideal Weight  Most of the people in magazines and on TV are far slimmer than average, yet this is the ideal that many people aim for. Before you decide that you wont be happy until you get down to a certain number of pounds, consider:    · Your age. You probably wish you could get back to your college weight. But normal changes in metabolism and hormone levels that occur with aging can make this more difficult.  · Your gender. In general, men have more muscle and heavier bones than women, which means that healthy men usually weigh more than healthy women of the same height.  · Your current weight. If you are  very heavy, focus on losing a smaller amount (such as 10 percent of your body weight). Losing just 5 to 10 pounds can improve your health.  Your body fat percentage  A pound of muscle weighs the same as a pound of fat, but it takes up less space. Think of a trained athlete and a couch potato. Even though they may be the same height and weight, the athlete looks fitter, is healthier, and probably wears a smaller size of clothing. If you are muscular, a body fat test may be a more accurate measure of your ideal weight than the bathroom scale. Talk to your healthcare provider, who can help you set appropriate goals for yourself.  Body mass index (BMI)  Your body mass index is a number calculated from your weight and height. It is a fairly reliable indivactor of body fatness for most people. To calculate your BMI, see this BMI calculator from the CDC: http://www.cdc.gov/healthyweight/assessing/bmi/adult_bmi/english_bmi_calculator/bmi_calculator.html  Date Last Reviewed: 6/1/2015  © 1363-2606 [employer]. [address] [place] [address], [place] [address]. All rights reserved. This information is not intended as a substitute for professional medical care. Always follow your healthcare professional's instructions.

## 2018-10-11 LAB
ESTIMATED AVG GLUCOSE: 94 MG/DL
HBA1C MFR BLD HPLC: 4.9 %

## 2019-06-11 ENCOUNTER — OFFICE VISIT (OUTPATIENT)
Dept: FAMILY MEDICINE | Facility: CLINIC | Age: 49
End: 2019-06-11
Attending: FAMILY MEDICINE
Payer: COMMERCIAL

## 2019-06-11 VITALS
TEMPERATURE: 98 F | HEART RATE: 76 BPM | WEIGHT: 177.94 LBS | BODY MASS INDEX: 32.74 KG/M2 | SYSTOLIC BLOOD PRESSURE: 110 MMHG | HEIGHT: 62 IN | DIASTOLIC BLOOD PRESSURE: 68 MMHG | OXYGEN SATURATION: 100 %

## 2019-06-11 DIAGNOSIS — R21 RASH: ICD-10-CM

## 2019-06-11 DIAGNOSIS — B02.9 HERPES ZOSTER WITHOUT COMPLICATION: Primary | ICD-10-CM

## 2019-06-11 PROCEDURE — 99999 PR PBB SHADOW E&M-EST. PATIENT-LVL III: CPT | Mod: PBBFAC,,, | Performed by: FAMILY MEDICINE

## 2019-06-11 PROCEDURE — 99214 PR OFFICE/OUTPT VISIT, EST, LEVL IV, 30-39 MIN: ICD-10-PCS | Mod: S$GLB,,, | Performed by: FAMILY MEDICINE

## 2019-06-11 PROCEDURE — 3008F BODY MASS INDEX DOCD: CPT | Mod: CPTII,S$GLB,, | Performed by: FAMILY MEDICINE

## 2019-06-11 PROCEDURE — 99214 OFFICE O/P EST MOD 30 MIN: CPT | Mod: S$GLB,,, | Performed by: FAMILY MEDICINE

## 2019-06-11 PROCEDURE — 99999 PR PBB SHADOW E&M-EST. PATIENT-LVL III: ICD-10-PCS | Mod: PBBFAC,,, | Performed by: FAMILY MEDICINE

## 2019-06-11 PROCEDURE — 3008F PR BODY MASS INDEX (BMI) DOCUMENTED: ICD-10-PCS | Mod: CPTII,S$GLB,, | Performed by: FAMILY MEDICINE

## 2019-06-11 RX ORDER — TRIAMCINOLONE ACETONIDE 1 MG/G
OINTMENT TOPICAL 3 TIMES DAILY
Qty: 15 G | Refills: 1 | Status: SHIPPED | OUTPATIENT
Start: 2019-06-11 | End: 2021-11-09

## 2019-06-11 RX ORDER — ASCORBIC ACID 500 MG
500 TABLET ORAL DAILY
COMMUNITY
End: 2019-12-18

## 2019-06-11 RX ORDER — VALACYCLOVIR HYDROCHLORIDE 1 G/1
1000 TABLET, FILM COATED ORAL 3 TIMES DAILY
Qty: 21 TABLET | Refills: 0 | Status: SHIPPED | OUTPATIENT
Start: 2019-06-11 | End: 2019-12-18

## 2019-06-11 NOTE — PROGRESS NOTES
Subjective:       Patient ID: Nisha Mei is a 48 y.o. female.    Chief Complaint: Rash (x 2 days)    48 yr old pleasant white female with no significant medical history presents today as new patient to me and for evaluation of rash on abdomen and back. It is red, and swelling and nerve pain. She had chickenpox as a child. 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 Details as follows -        Rash   This is a new problem. The current episode started yesterday. The problem has been gradually worsening since onset. The affected locations include the abdomen and back. The rash is characterized by blistering, dryness, redness, swelling and pain. She was exposed to nothing. Pertinent negatives include no anorexia, congestion, cough, eye pain, facial edema, fever, nail changes, rhinorrhea, shortness of breath, sore throat or vomiting. Past treatments include nothing. The treatment provided no relief. Her past medical history is significant for varicella. There is no history of allergies or eczema.     Review of Systems   Constitutional: Negative.  Negative for activity change, diaphoresis, fever and unexpected weight change.   HENT: Negative.  Negative for congestion, ear discharge, hearing loss, rhinorrhea, sore throat and voice change.    Eyes: Negative.  Negative for pain, discharge and visual disturbance.   Respiratory: Negative.  Negative for cough, chest tightness, shortness of breath and wheezing.    Cardiovascular: Negative.  Negative for chest pain.   Gastrointestinal: Negative.  Negative for abdominal distention, anal bleeding, anorexia, constipation, nausea and vomiting.   Endocrine: Negative.  Negative for cold intolerance, polydipsia and polyuria.   Genitourinary: Negative.  Negative for decreased urine volume, difficulty urinating, dysuria, frequency, menstrual problem and vaginal pain.   Musculoskeletal: Negative.  Negative for arthralgias, gait problem and myalgias.   Skin: Positive for rash.  Negative for color change, nail changes, pallor and wound.   Allergic/Immunologic: Negative.  Negative for environmental allergies and immunocompromised state.   Neurological: Negative.  Negative for dizziness, tremors, seizures, speech difficulty and headaches.   Hematological: Negative.  Negative for adenopathy. Does not bruise/bleed easily.   Psychiatric/Behavioral: Negative.  Negative for agitation, confusion, decreased concentration, hallucinations, self-injury and suicidal ideas. The patient is not nervous/anxious.        PMH/PSH/FH/SH/MED/ALLERGY reviewed    Objective:       Vitals:    06/11/19 1724   BP: 110/68   Pulse: 76   Temp: 98 °F (36.7 °C)       Physical Exam   Constitutional: She is oriented to person, place, and time. She appears well-developed and well-nourished. No distress.   HENT:   Head: Normocephalic and atraumatic.   Right Ear: External ear normal.   Left Ear: External ear normal.   Nose: Nose normal.   Mouth/Throat: Oropharynx is clear and moist. No oropharyngeal exudate.   Eyes: Pupils are equal, round, and reactive to light. Conjunctivae and EOM are normal. Right eye exhibits no discharge. Left eye exhibits no discharge. No scleral icterus.   Neck: Normal range of motion. Neck supple. No JVD present. No tracheal deviation present. No thyromegaly present.   Cardiovascular: Normal rate, regular rhythm, normal heart sounds and intact distal pulses. Exam reveals no gallop and no friction rub.   No murmur heard.  Pulmonary/Chest: Effort normal and breath sounds normal. No stridor. She has no wheezes. She has no rales. She exhibits no tenderness.   Abdominal: Soft. Bowel sounds are normal. She exhibits no distension and no mass. There is no tenderness. There is no rebound and no guarding. No hernia.   Musculoskeletal: Normal range of motion. She exhibits no edema or tenderness.   Lymphadenopathy:     She has no cervical adenopathy.   Neurological: She is alert and oriented to person, place, and  time. She has normal reflexes. She displays normal reflexes. No cranial nerve deficit. She exhibits normal muscle tone. Coordination normal.   Skin: Skin is warm and dry. Rash noted. She is not diaphoretic. There is erythema. No pallor.   Patchy, erythematous, raised, blistery rash on chest wall, abdomen and right side back with T4-T6 dermatome distribution   Psychiatric: She has a normal mood and affect. Her behavior is normal. Judgment and thought content normal.       Assessment:       1. Herpes zoster without complication    2. Rash        Plan:           Nisha was seen today for rash.    Diagnoses and all orders for this visit:    Herpes zoster without complication  -     valACYclovir (VALTREX) 1000 MG tablet; Take 1 tablet (1,000 mg total) by mouth 3 (three) times daily. for 7 days  -     triamcinolone acetonide 0.1% (KENALOG) 0.1 % ointment; Apply topically 3 (three) times daily.    Rash  -     valACYclovir (VALTREX) 1000 MG tablet; Take 1 tablet (1,000 mg total) by mouth 3 (three) times daily. for 7 days  -     triamcinolone acetonide 0.1% (KENALOG) 0.1 % ointment; Apply topically 3 (three) times daily.      Shingles/dermatitis  -contact precautions  -valtrex x 7 days and kenalog local application  -persisting pain - needs neurontin    Spent adequate time in obtaining history and explaining differentials    40 minutes spent during this visit of which greater than 50% devoted to face-face counseling and coordination of care regarding diagnosis and management plan    Follow up if symptoms worsen or fail to improve.

## 2019-06-11 NOTE — PATIENT INSTRUCTIONS

## 2019-10-01 ENCOUNTER — TELEPHONE (OUTPATIENT)
Dept: OBSTETRICS AND GYNECOLOGY | Facility: CLINIC | Age: 49
End: 2019-10-01

## 2019-10-01 DIAGNOSIS — Z12.31 VISIT FOR SCREENING MAMMOGRAM: Primary | ICD-10-CM

## 2019-10-01 NOTE — TELEPHONE ENCOUNTER
----- Message from Veronica Polk sent at 10/1/2019  3:03 PM CDT -----  Contact: pt   Pt called to schedule a Mammo needs a order   Callback#[phone]  Thank You  WALLY Polk Patient Access Rep

## 2019-10-08 ENCOUNTER — HOSPITAL ENCOUNTER (OUTPATIENT)
Dept: RADIOLOGY | Facility: HOSPITAL | Age: 49
Discharge: HOME | End: 2019-10-08
Attending: OBSTETRICS & GYNECOLOGY
Payer: COMMERCIAL

## 2019-10-08 DIAGNOSIS — Z12.31 VISIT FOR SCREENING MAMMOGRAM: ICD-10-CM

## 2019-10-08 PROCEDURE — 77067 SCR MAMMO BI INCL CAD: CPT | Mod: TC

## 2019-10-08 PROCEDURE — 77067 MAMMO DIGITAL SCREENING BILAT WITH TOMOSYNTHESIS_CAD: ICD-10-PCS | Mod: 26,,, | Performed by: RADIOLOGY

## 2019-10-08 PROCEDURE — 77063 MAMMO DIGITAL SCREENING BILAT WITH TOMOSYNTHESIS_CAD: ICD-10-PCS | Mod: 26,,, | Performed by: RADIOLOGY

## 2019-10-08 PROCEDURE — 77067 SCR MAMMO BI INCL CAD: CPT | Mod: 26,,, | Performed by: RADIOLOGY

## 2019-10-08 PROCEDURE — 77063 BREAST TOMOSYNTHESIS BI: CPT | Mod: 26,,, | Performed by: RADIOLOGY

## 2019-10-08 PROCEDURE — 77063 BREAST TOMOSYNTHESIS BI: CPT | Mod: TC

## 2019-12-18 ENCOUNTER — OFFICE VISIT (OUTPATIENT)
Dept: DERMATOLOGY | Facility: CLINIC | Age: 49
End: 2019-12-18
Payer: COMMERCIAL

## 2019-12-18 DIAGNOSIS — Z12.83 SKIN CANCER SCREENING: ICD-10-CM

## 2019-12-18 DIAGNOSIS — L81.4 LENTIGINES: ICD-10-CM

## 2019-12-18 DIAGNOSIS — L82.1 SK (SEBORRHEIC KERATOSIS): Primary | ICD-10-CM

## 2019-12-18 DIAGNOSIS — D18.01 CHERRY ANGIOMA: ICD-10-CM

## 2019-12-18 DIAGNOSIS — D22.9 MULTIPLE BENIGN NEVI: ICD-10-CM

## 2019-12-18 PROCEDURE — 99213 OFFICE O/P EST LOW 20 MIN: CPT | Mod: S$GLB,,, | Performed by: DERMATOLOGY

## 2019-12-18 PROCEDURE — 99999 PR PBB SHADOW E&M-EST. PATIENT-LVL II: CPT | Mod: PBBFAC,,, | Performed by: DERMATOLOGY

## 2019-12-18 PROCEDURE — 99213 PR OFFICE/OUTPT VISIT, EST, LEVL III, 20-29 MIN: ICD-10-PCS | Mod: S$GLB,,, | Performed by: DERMATOLOGY

## 2019-12-18 PROCEDURE — 99999 PR PBB SHADOW E&M-EST. PATIENT-LVL II: ICD-10-PCS | Mod: PBBFAC,,, | Performed by: DERMATOLOGY

## 2019-12-18 NOTE — PATIENT INSTRUCTIONS
SEBORRHEIC KERATOSES        What causes seborrheic keratoses?    Seborrheic keratoses are harmless, common skin growths that first appear during adult life.  As time goes by, more growths appear.  Some persons have a very large number of them.  Seborrheic keratoses appear on both covered and uncovered parts of the body; they are not caused by sunlight.  The tendency to develop seborrheic keratoses is inherited.    Seborrheic keratoses are harmless and never become malignant.  They begin as slightly raised, light brown spots.  Gradually they thicken and take on a rough wartlike surface.  They slowly darken and may turn black.  These color changes are harmless.  Seborrheic keratoses are superficial and look as if they were stuck on the skin.  Persons who have had several seborrheic keratoses can usually recognize this type of benign growth.  However, if you are concerned or unsure about any growth, consult me.    Treatment    Seborrheic keratoses can easily be removed in the office.  The only reason for removing a seborrheic keratosis is your wish to get rid of it.      Summer Sun Protection      The [facility] would like to remind you of the importance of sun protection all year round and particularly during the summer when the suns rays are the strongest. It has been proven that both acute and chronic sun exposure damages our cells and leads to skin cancer. Beyond skin cancer, the sun causes 90% of the symptoms of pre-mature skin aging, including wrinkles, lentigines (brown spots), and thin, easily bruised skin. Proper sun protection can help prevent these unwanted conditions.    Many patients report that the dont go in the sun. It has been shown that the average person receives 18 hours of incidental sun exposure per week during activities such as walking through parking lots, driving, or sitting next to windows. This accumulates to several bad sunburns per year!    In choosing sunscreen,  you want one that protects against both UVA and UVB rays. It is recommended that you use one of SPF 30 or higher. It is important to apply the sunscreen about 20 minutes prior to sun exposure. Most sunscreens are chemical sunscreens and a reaction must take place in the skin so that they are effective. If they are applied and then you are immediately exposed to the sun or start sweating, this reaction has not had time to take place and you are therefore unprotected. Sunscreen needs to be reapplied every 2 hours if you are participating in water sports or sweating. We recommend Elta MD or Neutrogena Ultra Sheer Dry Touch SPF 55 for daily use; however there are many options and it is most important for you to find one that you will use on a consistent basis.    If you have sensitive skin, you may do best with a sunscreen that contains only physical blockers such as titanium dioxide or zinc oxide. These are typically thicker and harder to apply, however they afford very good protection. Neutrogena Sensitive Skin, Blue Lizard Sensitive Skin (pink top) or Neutrogena Pure and Free are popular ones.     Aside from sunscreen, clothes with UV protection, wide brimmed hats, and sunglasses are other means of sun protection that we recommend.                        [facility]  [address]  [facility] [address]  Dept: [phone]  Dept Fax: [phone]

## 2019-12-18 NOTE — PROGRESS NOTES
"  Subjective:       Patient ID:  Nisha Mei is a 49 y.o. female who presents for   Chief Complaint   Patient presents with    Skin Check     UBSE     HPI   Patient is here today for a "mole" check.   Pt has a history of  mild sun exposure in the past.   Pt recalls several blistering sunburns in the past- no  Pt has history of tanning bed use- yes  Pt has  had moles removed in the past- yes   Pt has history of melanoma in first degree relatives-  no    Pt presents today for UBSE, mole on R breast, x 1 year, asymptomatic, Tx none  Spots on L cheek, x 6 months, asymptomatic, No prev tx .    Hx of mildly atypical mole removed from R shoulder in 2011.    Review of Systems   Constitutional: Negative for fever, chills, weight loss, weight gain, fatigue, night sweats and malaise.   Skin: Positive for dry skin, daily sunscreen use, activity-related sunscreen use and wears hat. Negative for recent sunburn.   Hematologic/Lymphatic: Does not bruise/bleed easily.        Objective:    Physical Exam   Constitutional: She appears well-developed and well-nourished. No distress.   Neurological: She is alert and oriented to person, place, and time. She is not disoriented.   Psychiatric: She has a normal mood and affect.   Skin:   Areas Examined (abnormalities noted in diagram):   Head / Face Inspection Performed  Neck Inspection Performed  Chest / Axilla Inspection Performed  Abdomen Inspection Performed  Back Inspection Performed  RUE Inspected  LUE Inspection Performed                       Diagram Legend     Erythematous scaling macule/papule c/w actinic keratosis       Vascular papule c/w angioma      Pigmented verrucoid papule/plaque c/w seborrheic keratosis      Yellow umbilicated papule c/w sebaceous hyperplasia      Irregularly shaped tan macule c/w lentigo     1-2 mm smooth white papules consistent with Milia      Movable subcutaneous cyst with punctum c/w epidermal inclusion cyst      Subcutaneous movable cyst " c/w pilar cyst      Firm pink to brown papule c/w dermatofibroma      Pedunculated fleshy papule(s) c/w skin tag(s)      Evenly pigmented macule c/w junctional nevus     Mildly variegated pigmented, slightly irregular-bordered macule c/w mildly atypical nevus      Flesh colored to evenly pigmented papule c/w intradermal nevus       Pink pearly papule/plaque c/w basal cell carcinoma      Erythematous hyperkeratotic cursted plaque c/w SCC      Surgical scar with no sign of skin cancer recurrence      Open and closed comedones      Inflammatory papules and pustules      Verrucoid papule consistent consistent with wart     Erythematous eczematous patches and plaques     Dystrophic onycholytic nail with subungual debris c/w onychomycosis     Umbilicated papule    Erythematous-base heme-crusted tan verrucoid plaque consistent with inflamed seborrheic keratosis     Erythematous Silvery Scaling Plaque c/w Psoriasis     See annotation      Assessment / Plan:        SK (seborrheic keratosis)  These are benign inherited growths without a malignant potential. Reassurance given to patient. No treatment is necessary.   Treatment of benign, asymptomatic lesions may be considered cosmetic.  Warned about risk of hypo- or hyperpigmentation with treatment and risk of recurrence.    Multiple benign nevi  Benign-appearing on exam today. Counseled pt to monitor mole(s) and return to clinic if any changes noted or symptoms (bleeding, itching, pain, etc) noted. Brochure provided.    Lentigines  These are benign sun spots which should be monitored for changes. Patient instructed in importance of daily broad spectrum sunscreen use with spf at least 30. Sun avoidance and topical protection/protective clothing discussed.    Rincon angioma  This is a benign vascular lesion. Reassurance given. No treatment required. Treatment of benign, asymptomatic lesions may be considered cosmetic.    Skin cancer screening  Upper body skin examination  performed today including at least 6 points as noted in physical examination. No lesions suspicious for malignancy noted.  Patient instructed in importance of daily broad spectrum sunscreen use with spf at least 30. Sun avoidance and topical protection/protective clothing discussed.    Follow up in about 1 year (around 12/18/2020) for skin check or sooner for any concerns.

## 2020-04-21 DIAGNOSIS — Z01.84 ANTIBODY RESPONSE EXAMINATION: ICD-10-CM

## 2020-04-22 ENCOUNTER — LAB VISIT (OUTPATIENT)
Dept: LAB | Facility: HOSPITAL | Age: 50
End: 2020-04-22
Attending: INTERNAL MEDICINE
Payer: COMMERCIAL

## 2020-04-22 DIAGNOSIS — Z01.84 ANTIBODY RESPONSE EXAMINATION: ICD-10-CM

## 2020-04-22 LAB — SARS-COV-2 IGG SERPL QL IA: NEGATIVE

## 2020-04-22 PROCEDURE — 36415 COLL VENOUS BLD VENIPUNCTURE: CPT

## 2020-04-22 PROCEDURE — 86769 SARS-COV-2 COVID-19 ANTIBODY: CPT

## 2020-06-05 ENCOUNTER — TELEPHONE (OUTPATIENT)
Dept: INTERNAL MEDICINE | Facility: CLINIC | Age: 50
End: 2020-06-05

## 2020-06-08 DIAGNOSIS — R05.9 COUGH: Primary | ICD-10-CM

## 2020-06-09 ENCOUNTER — LAB VISIT (OUTPATIENT)
Dept: FAMILY MEDICINE | Facility: CLINIC | Age: 50
End: 2020-06-09
Payer: COMMERCIAL

## 2020-06-09 DIAGNOSIS — R05.9 COUGH: ICD-10-CM

## 2020-06-09 PROCEDURE — U0003 INFECTIOUS AGENT DETECTION BY NUCLEIC ACID (DNA OR RNA); SEVERE ACUTE RESPIRATORY SYNDROME CORONAVIRUS 2 (SARS-COV-2) (CORONAVIRUS DISEASE [COVID-19]), AMPLIFIED PROBE TECHNIQUE, MAKING USE OF HIGH THROUGHPUT TECHNOLOGIES AS DESCRIBED BY CMS-2020-01-R: HCPCS

## 2020-06-10 DIAGNOSIS — U07.1 COVID-19 VIRUS DETECTED: ICD-10-CM

## 2020-06-10 LAB — SARS-COV-2 RNA RESP QL NAA+PROBE: DETECTED

## 2020-06-11 ENCOUNTER — TELEPHONE (OUTPATIENT)
Dept: URGENT CARE | Facility: CLINIC | Age: 50
End: 2020-06-11

## 2020-06-11 NOTE — TELEPHONE ENCOUNTER
Called pt on behalf of Employee [employer] to discuss COVID-19 result. I was unable to leave message, no VM.

## 2020-06-12 ENCOUNTER — RESEARCH ENCOUNTER (OUTPATIENT)
Dept: RESEARCH | Facility: HOSPITAL | Age: 50
End: 2020-06-12

## 2020-06-12 ENCOUNTER — TELEPHONE (OUTPATIENT)
Dept: URGENT CARE | Facility: CLINIC | Age: 50
End: 2020-06-12

## 2020-06-12 NOTE — PROGRESS NOTES
The Patient granting permission, Nisha Mei was called today regarding the patient's participation in (IRB #[id number] PI: Harini).   The Verbal Informed Consent was read and discussed by the consenter. The following was discussed:   Types of specimens to be collected   All medical information released to researchers will be stripped of identifiers and no patient information will be given to anyone outside of this research project.    Participating in a research study is not the same as getting regular medical care and will not improve the patient's health. The purpose of a research study is to gather information.  Being in this study does not interfere with your regular medical care.   The patient/LAR does not have to participate in this study. If they do not join, their care at [facility] will not be affected.  The person granting permission was provided adequate time to ask questions regarding the scope and purpose of the study.  Permission was obtained by telephone. Patient stated she lives and work near [facility]. She prefers collection to be done at [place].   The above statements were read by the person obtaining permission to the person granting permission and witnessed by Mary Reid, who also confirmed the patient/LAR's consent to the study. The witness information was documented on the verbal consent form as well.  This Verbal Informed Consent process was conducted prior to initiation of any study procedures.

## 2020-06-12 NOTE — TELEPHONE ENCOUNTER
Second call placed on behalf of Employee [employer] to discuss COVID-19 result.  Able to leave a message this time requesting patient to call back re: COVID result

## 2020-06-15 ENCOUNTER — HOSPITAL ENCOUNTER (EMERGENCY)
Facility: HOSPITAL | Age: 50
Discharge: HOME | End: 2020-06-16
Attending: EMERGENCY MEDICINE
Payer: COMMERCIAL

## 2020-06-15 DIAGNOSIS — R06.02 SOB (SHORTNESS OF BREATH): ICD-10-CM

## 2020-06-15 DIAGNOSIS — U07.1 COVID-19: Primary | ICD-10-CM

## 2020-06-15 DIAGNOSIS — Z20.822 SUSPECTED COVID-19 VIRUS INFECTION: ICD-10-CM

## 2020-06-15 DIAGNOSIS — Z00.6 EXAMINATION OF PARTICIPANT IN CLINICAL TRIAL: ICD-10-CM

## 2020-06-15 LAB
ALBUMIN SERPL BCP-MCNC: 4.4 G/DL (ref 3.5–5.2)
ALP SERPL-CCNC: 62 U/L (ref 55–135)
ALT SERPL W/O P-5'-P-CCNC: 14 U/L (ref 10–44)
ANION GAP SERPL CALC-SCNC: 9 MMOL/L (ref 8–16)
AST SERPL-CCNC: 16 U/L (ref 10–40)
BASOPHILS # BLD AUTO: 0.02 K/UL (ref 0–0.2)
BASOPHILS NFR BLD: 0.2 % (ref 0–1.9)
BILIRUB SERPL-MCNC: 0.6 MG/DL (ref 0.1–1)
BNP SERPL-MCNC: 20 PG/ML (ref 0–99)
BUN SERPL-MCNC: 9 MG/DL (ref 6–20)
CALCIUM SERPL-MCNC: 9.4 MG/DL (ref 8.7–10.5)
CHLORIDE SERPL-SCNC: 106 MMOL/L (ref 95–110)
CK SERPL-CCNC: 39 U/L (ref 20–180)
CO2 SERPL-SCNC: 23 MMOL/L (ref 23–29)
CREAT SERPL-MCNC: 0.8 MG/DL (ref 0.5–1.4)
CRP SERPL-MCNC: 4.2 MG/L (ref 0–8.2)
DIFFERENTIAL METHOD: ABNORMAL
EOSINOPHIL # BLD AUTO: 0.1 K/UL (ref 0–0.5)
EOSINOPHIL NFR BLD: 0.7 % (ref 0–8)
ERYTHROCYTE [DISTWIDTH] IN BLOOD BY AUTOMATED COUNT: 11.8 % (ref 11.5–14.5)
EST. GFR  (AFRICAN AMERICAN): >60 ML/MIN/1.73 M^2
EST. GFR  (NON AFRICAN AMERICAN): >60 ML/MIN/1.73 M^2
GLUCOSE SERPL-MCNC: 97 MG/DL (ref 70–110)
HCT VFR BLD AUTO: 39.8 % (ref 37–48.5)
HGB BLD-MCNC: 13.6 G/DL (ref 12–16)
IMM GRANULOCYTES # BLD AUTO: 0.02 K/UL (ref 0–0.04)
IMM GRANULOCYTES NFR BLD AUTO: 0.2 % (ref 0–0.5)
LACTATE SERPL-SCNC: 0.8 MMOL/L (ref 0.5–2.2)
LDH SERPL L TO P-CCNC: 189 U/L (ref 110–260)
LYMPHOCYTES # BLD AUTO: 1.6 K/UL (ref 1–4.8)
LYMPHOCYTES NFR BLD: 18.3 % (ref 18–48)
MCH RBC QN AUTO: 30.1 PG (ref 27–31)
MCHC RBC AUTO-ENTMCNC: 34.2 G/DL (ref 32–36)
MCV RBC AUTO: 88 FL (ref 82–98)
MONOCYTES # BLD AUTO: 0.5 K/UL (ref 0.3–1)
MONOCYTES NFR BLD: 5.9 % (ref 4–15)
NEUTROPHILS # BLD AUTO: 6.5 K/UL (ref 1.8–7.7)
NEUTROPHILS NFR BLD: 74.7 % (ref 38–73)
NRBC BLD-RTO: 0 /100 WBC
PLATELET # BLD AUTO: 310 K/UL (ref 150–350)
PMV BLD AUTO: 10.7 FL (ref 9.2–12.9)
POTASSIUM SERPL-SCNC: 3.7 MMOL/L (ref 3.5–5.1)
PROT SERPL-MCNC: 7.6 G/DL (ref 6–8.4)
RBC # BLD AUTO: 4.52 M/UL (ref 4–5.4)
SODIUM SERPL-SCNC: 138 MMOL/L (ref 136–145)
TROPONIN I SERPL DL<=0.01 NG/ML-MCNC: <0.006 NG/ML (ref 0–0.03)
WBC # BLD AUTO: 8.69 K/UL (ref 3.9–12.7)

## 2020-06-15 PROCEDURE — 93010 EKG 12-LEAD: ICD-10-PCS | Mod: ,,, | Performed by: INTERNAL MEDICINE

## 2020-06-15 PROCEDURE — 99285 EMERGENCY DEPT VISIT HI MDM: CPT | Mod: 25

## 2020-06-15 PROCEDURE — 84145 PROCALCITONIN (PCT): CPT

## 2020-06-15 PROCEDURE — 82550 ASSAY OF CK (CPK): CPT

## 2020-06-15 PROCEDURE — 96360 HYDRATION IV INFUSION INIT: CPT

## 2020-06-15 PROCEDURE — 84484 ASSAY OF TROPONIN QUANT: CPT

## 2020-06-15 PROCEDURE — 80053 COMPREHEN METABOLIC PANEL: CPT

## 2020-06-15 PROCEDURE — 85025 COMPLETE CBC W/AUTO DIFF WBC: CPT

## 2020-06-15 PROCEDURE — 83605 ASSAY OF LACTIC ACID: CPT

## 2020-06-15 PROCEDURE — 93010 ELECTROCARDIOGRAM REPORT: CPT | Mod: ,,, | Performed by: INTERNAL MEDICINE

## 2020-06-15 PROCEDURE — 86140 C-REACTIVE PROTEIN: CPT

## 2020-06-15 PROCEDURE — 83880 ASSAY OF NATRIURETIC PEPTIDE: CPT

## 2020-06-15 PROCEDURE — 93005 ELECTROCARDIOGRAM TRACING: CPT

## 2020-06-15 PROCEDURE — 82728 ASSAY OF FERRITIN: CPT

## 2020-06-15 PROCEDURE — 83615 LACTATE (LD) (LDH) ENZYME: CPT

## 2020-06-16 VITALS
HEIGHT: 62 IN | SYSTOLIC BLOOD PRESSURE: 123 MMHG | HEART RATE: 64 BPM | RESPIRATION RATE: 18 BRPM | BODY MASS INDEX: 33.13 KG/M2 | DIASTOLIC BLOOD PRESSURE: 71 MMHG | WEIGHT: 180 LBS | OXYGEN SATURATION: 100 % | TEMPERATURE: 98 F

## 2020-06-16 LAB
FERRITIN SERPL-MCNC: 70 NG/ML (ref 20–300)
PROCALCITONIN SERPL IA-MCNC: 0.11 NG/ML
TROPONIN I SERPL DL<=0.01 NG/ML-MCNC: <0.006 NG/ML (ref 0–0.03)

## 2020-06-16 PROCEDURE — 25000003 PHARM REV CODE 250: Performed by: EMERGENCY MEDICINE

## 2020-06-16 PROCEDURE — 84484 ASSAY OF TROPONIN QUANT: CPT

## 2020-06-16 RX ADMIN — SODIUM CHLORIDE 1000 ML: 0.9 INJECTION, SOLUTION INTRAVENOUS at 12:06

## 2020-06-16 NOTE — ED NOTES
Patient resting calmly in bed with no distress noted. Patient updated to plan of care. VSS. Skin is warm and dry. Respirations are even and non labored. Patient denies any pain or shortness of breath at this time. Call light is in reach. Will continue to monitor.

## 2020-06-16 NOTE — ED PROVIDER NOTES
Encounter Date: 6/15/2020       History     Chief Complaint   Patient presents with    Shortness of Breath     Pt reports she tested positive for COVID-19 last Tuesday, but was not having any symptoms other than loss of taste and smell and a slight cough. Pt reports however she started having dizziness and shortness of breath today, as well as pain in left shoulder. Pt denies chest pain, nausea, vomiting or diarrhea.         This is a 49-year-old female, with a recent COVID-19 infection on Tuesday, who presents the ER for evaluation sudden onset of dizziness and shortness of breath.  Reported 1st onset approximately 8:00 p.m., she had palpitations, short of breath, and sweaty palms.  She reports her symptoms improved, and then it happened again.  She reported this concern to come to the ER.  She arrived in the ER, no acute distress, no complaints.  She reports her symptoms have improved at this time.  She is complaining of some mild left-sided chest pain.  She reports her COVID-19 infection is well controlled        Review of patient's allergies indicates:  No Known Allergies  Past Medical History:   Diagnosis Date    Allergy     History of ovarian cyst      Past Surgical History:   Procedure Laterality Date    DILATION AND CURETTAGE OF UTERUS      x3    TUBAL LIGATION Bilateral 11/19/2008     Family History   Problem Relation Age of Onset    Hypertension Mother     Mitral valve prolapse Mother     Diabetes Mellitus Paternal Grandfather     Heart attack Maternal Aunt     Breast cancer Neg Hx     Colon cancer Neg Hx     Ovarian cancer Neg Hx     Melanoma Neg Hx      Social History     Tobacco Use    Smoking status: Never Smoker    Smokeless tobacco: Never Used   Substance Use Topics    Alcohol use: Yes     Comment: rare    Drug use: No     Review of Systems   Constitutional: Positive for chills. Negative for fatigue and fever.   Respiratory: Positive for chest tightness and shortness of breath.     Cardiovascular: Negative for chest pain.   Gastrointestinal: Negative for abdominal pain, nausea and vomiting.   Musculoskeletal: Negative for myalgias.   Skin: Negative for rash.   All other systems reviewed and are negative.      Physical Exam     Initial Vitals [06/15/20 2121]   BP Pulse Resp Temp SpO2   (!) 148/89 78 18 98.2 °F (36.8 °C) 100 %      MAP       --         Physical Exam    Constitutional: She appears well-developed and well-nourished. She is not diaphoretic. No distress.   HENT:   Head: Normocephalic and atraumatic.   Eyes: Pupils are equal, round, and reactive to light.   Neck: Normal range of motion.   Cardiovascular: Normal rate.   Pulmonary/Chest: Breath sounds normal. No respiratory distress. She has no wheezes. She has no rales.   Abdominal: Soft. She exhibits no distension. There is no abdominal tenderness.   Musculoskeletal: Normal range of motion.   Neurological: She is alert and oriented to person, place, and time. GCS score is 15. GCS eye subscore is 4. GCS verbal subscore is 5. GCS motor subscore is 6.   Skin: Skin is warm and dry. Capillary refill takes less than 2 seconds.   Psychiatric: She has a normal mood and affect.         ED Course   Procedures  Labs Reviewed   CBC W/ AUTO DIFFERENTIAL - Abnormal; Notable for the following components:       Result Value    Gran% 74.7 (*)     All other components within normal limits   COMPREHENSIVE METABOLIC PANEL   C-REACTIVE PROTEIN   FERRITIN   LACTATE DEHYDROGENASE   CK   LACTIC ACID, PLASMA   TROPONIN I   B-TYPE NATRIURETIC PEPTIDE   PROCALCITONIN   TROPONIN I     EKG Readings: (Independently Interpreted)    Normal sinus rhythm 65 beats per minute, normal intervals, normal axis, no acute ST changes       Imaging Results          X-Ray Chest AP Portable (Final result)  Result time 06/15/20 23:26:41    Final result by Azucena Rey MD (06/15/20 23:26:41)                 Impression:      No acute intrathoracic abnormality  detected.      Electronically signed by: Azucena Candido  Date:    06/15/2020  Time:    23:26             Narrative:    EXAMINATION:  AP PORTABLE CHEST    CLINICAL HISTORY:  Shortness of breath    TECHNIQUE:  AP portable chest radiograph was submitted.    COMPARISON:  09/17/2014    FINDINGS:  AP portable chest radiograph demonstrates a cardiac silhouette within normal limits.  There is no focal consolidation, pneumothorax, or pleural effusion.                                 Medical Decision Making:   Initial Assessment:    49-year-old female presents the ER for evaluation dizziness shortness of breath, palpitations,  sweatiness in the palms,  Onset 2 times prior to arrival.  Denies history of this.  No other complaints at this time, well appearing, acute distress vital sign.  Differential includes anxiety attack, COVID-19 infection, arrhythmia, NSTEMI versus cause.  Will obtain blood work x-ray inflammatory markers will reassess.                   ED Course as of Jun 16 0406   Tue Jun 16, 2020   0135   Resting comfortably in bed, no acute distress.  Labs imaging reviewed, no acute process identified.  Inflammatory markers within normal limits 2 set troponin within normal limits, EKG normal sinus rhythm.  Discussed with patient diagnosis of atypical chest pain, cannot find   A cause of patient's symptoms at this at this time. Patient reports he feels great which to go home.  Discussed with patient return precautions primary care follow-up.  Patient understood agreed plan patient will be discharged.    [SE]      ED Course User Index  [SE] Kris Genao MD                Clinical Impression:       ICD-10-CM ICD-9-CM   1. SOB (shortness of breath)  R06.02 786.05   2. SOB (shortness of breath)  R06.02 786.05   3. Suspected Covid-19 Virus Infection  R68.89          Disposition:   Disposition: Discharged  Condition: Stable     ED Disposition Condition    Discharge Stable        ED Prescriptions     None         Follow-up Information     Follow up With Specialties Details Why Contact Info    Alisa Thomas MD Internal Medicine Call in 1 day  [address] [facility] [id number] [phone]                                       Kris Genao MD  06/16/20 3656

## 2020-06-16 NOTE — PROVIDER PROGRESS NOTES - EMERGENCY DEPT.
Emergency Department TeleTRIAGE Encounter Note      CHIEF COMPLAINT    Chief Complaint   Patient presents with    Shortness of Breath     Pt reports she tested positive for COVID-19 last Tuesday, but was not having any symptoms other than loss of taste and smell and a slight cough. Pt reports however she started having dizziness and shortness of breath today, as well as pain in left shoulder. Pt denies chest pain, nausea, vomiting or diarrhea.         VITAL SIGNS   Initial Vitals [06/15/20 2121]   BP Pulse Resp Temp SpO2   (!) 148/89 78 18 98.2 °F (36.8 °C) 100 %      MAP       --            ALLERGIES    Review of patient's allergies indicates:  No Known Allergies    PROVIDER TRIAGE NOTE  49-year-old female diagnosed with COVID-19 6 days ago complains of 2 episodes this evening of shortness of breath, diaphoresis, and palpitations.  She reports feeling okay right now.  She is afebrile, 100% room air sat, heart rate 78.  Will order EKG and chest x-ray, and defer further evaluations or treatment to the ED provider who will perform a face-to-face evaluation.      ORDERS  Labs Reviewed - No data to display    ED Orders (720h ago, onward)    Start Ordered     Status Ordering Provider    06/15/20 2128 06/15/20 2127  Airborne and Contact and Droplet Isolation Status  Continuous      Ordered JASBIR KHAN    06/15/20 2126 06/15/20 2126  X-Ray Chest AP Portable  1 time imaging      Ordered JASBIR KHAN    06/15/20 2125 06/15/20 2126  EKG 12-lead  Once      Ordered JASBIR KHAN            Virtual Visit Note: The provider triage portion of this emergency department evaluation and documentation was performed via [employer], a HIPAA-compliant telemedicine application, in concert with a tele-presenter in the room. A face to face patient evaluation with one of my colleagues will occur once the patient is placed in an emergency department room.      DISCLAIMER: This note was prepared with M*Modal voice recognition  transcription software. Garbled syntax, mangled pronouns, and other bizarre constructions may be attributed to that software system.

## 2020-06-17 DIAGNOSIS — Z00.6 EXAMINATION OF PARTICIPANT IN CLINICAL TRIAL: ICD-10-CM

## 2020-06-17 DIAGNOSIS — U07.1 COVID-19: Primary | ICD-10-CM

## 2020-07-07 ENCOUNTER — OFFICE VISIT (OUTPATIENT)
Dept: INTERNAL MEDICINE | Facility: CLINIC | Age: 50
End: 2020-07-07
Payer: COMMERCIAL

## 2020-07-07 VITALS
BODY MASS INDEX: 33.02 KG/M2 | TEMPERATURE: 98 F | WEIGHT: 180.56 LBS | HEART RATE: 69 BPM | OXYGEN SATURATION: 100 % | SYSTOLIC BLOOD PRESSURE: 118 MMHG | DIASTOLIC BLOOD PRESSURE: 74 MMHG

## 2020-07-07 DIAGNOSIS — R00.2 PALPITATIONS: ICD-10-CM

## 2020-07-07 DIAGNOSIS — R07.9 CHEST PAIN, UNSPECIFIED TYPE: Primary | ICD-10-CM

## 2020-07-07 PROCEDURE — 93010 EKG 12-LEAD: ICD-10-PCS | Mod: S$GLB,,, | Performed by: INTERNAL MEDICINE

## 2020-07-07 PROCEDURE — 3008F PR BODY MASS INDEX (BMI) DOCUMENTED: ICD-10-PCS | Mod: CPTII,S$GLB,, | Performed by: INTERNAL MEDICINE

## 2020-07-07 PROCEDURE — 99999 PR PBB SHADOW E&M-EST. PATIENT-LVL III: CPT | Mod: PBBFAC,,, | Performed by: INTERNAL MEDICINE

## 2020-07-07 PROCEDURE — 99214 PR OFFICE/OUTPT VISIT, EST, LEVL IV, 30-39 MIN: ICD-10-PCS | Mod: S$GLB,,, | Performed by: INTERNAL MEDICINE

## 2020-07-07 PROCEDURE — 93010 ELECTROCARDIOGRAM REPORT: CPT | Mod: S$GLB,,, | Performed by: INTERNAL MEDICINE

## 2020-07-07 PROCEDURE — 99214 OFFICE O/P EST MOD 30 MIN: CPT | Mod: S$GLB,,, | Performed by: INTERNAL MEDICINE

## 2020-07-07 PROCEDURE — 99999 PR PBB SHADOW E&M-EST. PATIENT-LVL III: ICD-10-PCS | Mod: PBBFAC,,, | Performed by: INTERNAL MEDICINE

## 2020-07-07 PROCEDURE — 93005 EKG 12-LEAD: ICD-10-PCS | Mod: S$GLB,,, | Performed by: INTERNAL MEDICINE

## 2020-07-07 PROCEDURE — 93005 ELECTROCARDIOGRAM TRACING: CPT | Mod: S$GLB,,, | Performed by: INTERNAL MEDICINE

## 2020-07-07 PROCEDURE — 3008F BODY MASS INDEX DOCD: CPT | Mod: CPTII,S$GLB,, | Performed by: INTERNAL MEDICINE

## 2020-07-07 NOTE — PROGRESS NOTES
Patient ID: Nisha Mei is a 49 y.o. female.    Chief Complaint: No chief complaint on file.    TOD Cameron is a 49 y.o. female who presents today with chief complaint of chest pain.  Patient had an episode on June 15th where her palms were sweaty and she felt faint and dizzy.  After this happened a 2nd time, she went to the emergency department.  They did an EKG, chest x-ray, and troponins which were normal.  She had also been diagnosed with COVID 6 days prior.  Patient then developed cough and a little shortness of breath.  Finally, about 3 weeks ago, she developed some pain.  It is located on the left side of the chest and breast area.  It radiates to the shoulder.  It is described as both sharp and an ache in character.  It is not constant.  It is intermittent.  It occurs every day, several times per day.  It has occurred with exertion and with rest.  When it occurs, it lasts for few minutes in duration.  Pain is rated 2 or 3/10 in severity.  There is an associated symptom of palpitations.  No associated dizziness.    Onset:  3 weeks ago  Location:  Left side chest and breast  Duration:  Min  Character:  Sharp and achy  Associated:  Palpitations  Relieving:  None  Exacerbating:  None  Severity:  2 or 3/10 in severity    Review of Systems   Cardiovascular: Positive for chest pain and palpitations.   All other systems reviewed and are negative.      Objective:     Vitals:    07/07/20 0827   BP: 118/74   Pulse: 69   Temp: 97.8 °F (36.6 °C)        Physical Exam  Vitals signs reviewed.   Constitutional:       General: She is not in acute distress.     Appearance: Normal appearance. She is well-developed. She is not ill-appearing, toxic-appearing or diaphoretic.   HENT:      Head: Normocephalic and atraumatic.      Right Ear: External ear normal.      Left Ear: External ear normal.      Nose: Nose normal.   Eyes:      General: No scleral icterus.        Right eye: No discharge.         Left eye: No  discharge.      Extraocular Movements: Extraocular movements intact.      Conjunctiva/sclera: Conjunctivae normal.   Cardiovascular:      Rate and Rhythm: Normal rate and regular rhythm.      Heart sounds: Normal heart sounds. No murmur. No friction rub. No gallop.    Pulmonary:      Effort: Pulmonary effort is normal. No respiratory distress.      Breath sounds: Normal breath sounds. No stridor. No wheezing or rales.   Skin:     General: Skin is warm and dry.   Neurological:      Mental Status: She is alert and oriented to person, place, and time. Mental status is at baseline.   Psychiatric:         Mood and Affect: Mood normal.         Behavior: Behavior normal.         Thought Content: Thought content normal.         Judgment: Judgment normal.         Assessment:       1. Chest pain, unspecified type Active   2. Palpitations Active       Plan:     Discussed with patient that pain most likely musculoskeletal in nature.  Will obtain Holter monitor and refer to Cardiology to rule out underlying arrhythmia.  Use heat, stretching, and over-the-counter NSAID medications as needed for pain at this time.    Chest pain, unspecified type  -     EKG 12-lead; Future    Palpitations  -     Holter monitor - 48 hour; Future  -     Ambulatory referral/consult to Cardiology; Future; Expected date: 07/14/2020      RTC PRN      Warning signs discussed, patient to call with any further issues or worsening of symptoms.       Parts of the above note were dictated using a voice dictation software. Please excuse any grammatical or typographical errors.

## 2020-07-15 ENCOUNTER — TELEPHONE (OUTPATIENT)
Dept: CARDIOLOGY | Facility: CLINIC | Age: 50
End: 2020-07-15

## 2020-07-15 ENCOUNTER — HOSPITAL ENCOUNTER (OUTPATIENT)
Dept: CARDIOLOGY | Facility: HOSPITAL | Age: 50
Discharge: HOME | End: 2020-07-15
Attending: INTERNAL MEDICINE

## 2020-07-15 DIAGNOSIS — R00.2 PALPITATIONS: ICD-10-CM

## 2020-07-15 PROCEDURE — 93227 XTRNL ECG REC<48 HR R&I: CPT | Mod: ,,, | Performed by: INTERNAL MEDICINE

## 2020-07-15 PROCEDURE — 93227 HOLTER MONITOR - 48 HOUR (CUPID ONLY): ICD-10-PCS | Mod: ,,, | Performed by: INTERNAL MEDICINE

## 2020-07-15 PROCEDURE — 93226 XTRNL ECG REC<48 HR SCAN A/R: CPT

## 2020-07-15 NOTE — TELEPHONE ENCOUNTER
Called the pt to follow up from the [employer] message.   Informed her tomorrows appointment is already booked now.  I also informed her to go to the ED when she is feeling chest pains and SOB.     I will message the pt with a sooner appointment available, which is Monday July 27, 2020 at 4:20 pm.    ND

## 2020-07-16 ENCOUNTER — HOSPITAL ENCOUNTER (OUTPATIENT)
Facility: HOSPITAL | Age: 50
Discharge: HOME | End: 2020-07-16
Attending: EMERGENCY MEDICINE | Admitting: EMERGENCY MEDICINE
Payer: COMMERCIAL

## 2020-07-16 VITALS
TEMPERATURE: 98 F | BODY MASS INDEX: 32.57 KG/M2 | HEART RATE: 83 BPM | DIASTOLIC BLOOD PRESSURE: 88 MMHG | WEIGHT: 177 LBS | SYSTOLIC BLOOD PRESSURE: 108 MMHG | OXYGEN SATURATION: 100 % | HEIGHT: 62 IN | RESPIRATION RATE: 18 BRPM

## 2020-07-16 DIAGNOSIS — R07.9 CHEST PAIN: ICD-10-CM

## 2020-07-16 DIAGNOSIS — R20.0 NUMBNESS: Primary | ICD-10-CM

## 2020-07-16 DIAGNOSIS — R07.9 CHEST PAIN, UNSPECIFIED TYPE: ICD-10-CM

## 2020-07-16 DIAGNOSIS — M54.12 RADICULOPATHY, CERVICAL REGION: ICD-10-CM

## 2020-07-16 DIAGNOSIS — R07.89 CHEST PRESSURE: ICD-10-CM

## 2020-07-16 LAB
ALBUMIN SERPL BCP-MCNC: 4.2 G/DL (ref 3.5–5.2)
ALP SERPL-CCNC: 57 U/L (ref 55–135)
ALT SERPL W/O P-5'-P-CCNC: 13 U/L (ref 10–44)
ANION GAP SERPL CALC-SCNC: 8 MMOL/L (ref 8–16)
AST SERPL-CCNC: 17 U/L (ref 10–40)
BASOPHILS # BLD AUTO: 0.02 K/UL (ref 0–0.2)
BASOPHILS NFR BLD: 0.3 % (ref 0–1.9)
BILIRUB SERPL-MCNC: 0.5 MG/DL (ref 0.1–1)
BNP SERPL-MCNC: 15 PG/ML (ref 0–99)
BUN SERPL-MCNC: 12 MG/DL (ref 6–20)
CALCIUM SERPL-MCNC: 9.3 MG/DL (ref 8.7–10.5)
CHLORIDE SERPL-SCNC: 106 MMOL/L (ref 95–110)
CO2 SERPL-SCNC: 24 MMOL/L (ref 23–29)
CREAT SERPL-MCNC: 0.8 MG/DL (ref 0.5–1.4)
DIFFERENTIAL METHOD: ABNORMAL
EOSINOPHIL # BLD AUTO: 0.1 K/UL (ref 0–0.5)
EOSINOPHIL NFR BLD: 1 % (ref 0–8)
ERYTHROCYTE [DISTWIDTH] IN BLOOD BY AUTOMATED COUNT: 12 % (ref 11.5–14.5)
EST. GFR  (AFRICAN AMERICAN): >60 ML/MIN/1.73 M^2
EST. GFR  (NON AFRICAN AMERICAN): >60 ML/MIN/1.73 M^2
GLUCOSE SERPL-MCNC: 101 MG/DL (ref 70–110)
HCT VFR BLD AUTO: 35.9 % (ref 37–48.5)
HGB BLD-MCNC: 12.5 G/DL (ref 12–16)
IMM GRANULOCYTES # BLD AUTO: 0.01 K/UL (ref 0–0.04)
IMM GRANULOCYTES NFR BLD AUTO: 0.2 % (ref 0–0.5)
LYMPHOCYTES # BLD AUTO: 2.4 K/UL (ref 1–4.8)
LYMPHOCYTES NFR BLD: 38.8 % (ref 18–48)
MCH RBC QN AUTO: 31 PG (ref 27–31)
MCHC RBC AUTO-ENTMCNC: 34.8 G/DL (ref 32–36)
MCV RBC AUTO: 89 FL (ref 82–98)
MONOCYTES # BLD AUTO: 0.5 K/UL (ref 0.3–1)
MONOCYTES NFR BLD: 8.8 % (ref 4–15)
NEUTROPHILS # BLD AUTO: 3.1 K/UL (ref 1.8–7.7)
NEUTROPHILS NFR BLD: 50.9 % (ref 38–73)
NRBC BLD-RTO: 0 /100 WBC
PLATELET # BLD AUTO: 306 K/UL (ref 150–350)
PMV BLD AUTO: 10.8 FL (ref 9.2–12.9)
POTASSIUM SERPL-SCNC: 4 MMOL/L (ref 3.5–5.1)
PROT SERPL-MCNC: 7 G/DL (ref 6–8.4)
RBC # BLD AUTO: 4.03 M/UL (ref 4–5.4)
SODIUM SERPL-SCNC: 138 MMOL/L (ref 136–145)
TROPONIN I SERPL DL<=0.01 NG/ML-MCNC: <0.006 NG/ML (ref 0–0.03)
TROPONIN I SERPL DL<=0.01 NG/ML-MCNC: <0.006 NG/ML (ref 0–0.03)
WBC # BLD AUTO: 6.11 K/UL (ref 3.9–12.7)

## 2020-07-16 PROCEDURE — 99285 EMERGENCY DEPT VISIT HI MDM: CPT | Mod: 25

## 2020-07-16 PROCEDURE — 99243 PR OFFICE CONSULTATION,LEVEL III: ICD-10-PCS | Mod: 25,,, | Performed by: NURSE PRACTITIONER

## 2020-07-16 PROCEDURE — 83880 ASSAY OF NATRIURETIC PEPTIDE: CPT

## 2020-07-16 PROCEDURE — 93010 ELECTROCARDIOGRAM REPORT: CPT | Mod: 76,,, | Performed by: INTERNAL MEDICINE

## 2020-07-16 PROCEDURE — 25000003 PHARM REV CODE 250: Performed by: EMERGENCY MEDICINE

## 2020-07-16 PROCEDURE — 93005 ELECTROCARDIOGRAM TRACING: CPT

## 2020-07-16 PROCEDURE — 85025 COMPLETE CBC W/AUTO DIFF WBC: CPT

## 2020-07-16 PROCEDURE — G0378 HOSPITAL OBSERVATION PER HR: HCPCS

## 2020-07-16 PROCEDURE — 93010 ELECTROCARDIOGRAM REPORT: CPT | Mod: ,,, | Performed by: INTERNAL MEDICINE

## 2020-07-16 PROCEDURE — 99243 OFF/OP CNSLTJ NEW/EST LOW 30: CPT | Mod: 25,,, | Performed by: NURSE PRACTITIONER

## 2020-07-16 PROCEDURE — 93010 EKG 12-LEAD: ICD-10-PCS | Mod: ,,, | Performed by: INTERNAL MEDICINE

## 2020-07-16 PROCEDURE — 84484 ASSAY OF TROPONIN QUANT: CPT | Mod: 91

## 2020-07-16 PROCEDURE — 80053 COMPREHEN METABOLIC PANEL: CPT

## 2020-07-16 PROCEDURE — 94761 N-INVAS EAR/PLS OXIMETRY MLT: CPT

## 2020-07-16 RX ORDER — ASPIRIN 81 MG/1
81 TABLET ORAL DAILY
Status: DISCONTINUED | OUTPATIENT
Start: 2020-07-16 | End: 2020-07-16 | Stop reason: HOSPADM

## 2020-07-16 RX ORDER — HYDROCODONE BITARTRATE AND ACETAMINOPHEN 5; 325 MG/1; MG/1
1 TABLET ORAL EVERY 6 HOURS PRN
Status: DISCONTINUED | OUTPATIENT
Start: 2020-07-16 | End: 2020-07-16 | Stop reason: HOSPADM

## 2020-07-16 RX ORDER — ASPIRIN 81 MG/1
81 TABLET ORAL DAILY
COMMUNITY
End: 2021-11-09

## 2020-07-16 RX ORDER — SODIUM CHLORIDE 0.9 % (FLUSH) 0.9 %
10 SYRINGE (ML) INJECTION
Status: DISCONTINUED | OUTPATIENT
Start: 2020-07-16 | End: 2020-07-16 | Stop reason: HOSPADM

## 2020-07-16 RX ORDER — CYCLOBENZAPRINE HCL 10 MG
10 TABLET ORAL
Status: COMPLETED | OUTPATIENT
Start: 2020-07-16 | End: 2020-07-16

## 2020-07-16 RX ORDER — NAPROXEN SODIUM 220 MG/1
162 TABLET, FILM COATED ORAL
Status: COMPLETED | OUTPATIENT
Start: 2020-07-16 | End: 2020-07-16

## 2020-07-16 RX ADMIN — CYCLOBENZAPRINE 10 MG: 10 TABLET, FILM COATED ORAL at 04:07

## 2020-07-16 RX ADMIN — ASPIRIN 81 MG 162 MG: 81 TABLET ORAL at 04:07

## 2020-07-16 NOTE — PLAN OF CARE
Patient is discharging to home. VIVIAN informed patient of follow up appointments scheduled. Follow up with Dr. Garber scheduled for 7/27/2020 at 4:20 PM.     VIVIAN called scheduling spoke with Bridget, currently Neurology is unable to schedule anything earlier than October. Bridget informed SW that she would send an urgent message to Dr. Farrell to call patient and schedule something sooner.     VIVIAN spoke with Alejandra with scheduling regarding PCP follow up, no availabilities until September, Alejandra will message staff to call patient to schedule earlier appointment if possible.        07/16/20 1232   Final Note   Assessment Type Discharge Planning Assessment   Anticipated Discharge Disposition Home   What phone number can be called within the next 1-3 days to see how you are doing after discharge? [phone]   Hospital Follow Up  Appt(s) scheduled? Yes   Discharge plans and expectations educations in teach back method with documentation complete? Yes

## 2020-07-16 NOTE — ED NOTES
Bed rails are up and call light is within patient reach. Pt. States she had some numbness to the fingers to the left arm and a burning sensation to the back of her left shoulder. Denies c/o shortness of breath. Skin is PWD. Cardiac monitor shows NSR with HR-62. Will continue to monitor.

## 2020-07-16 NOTE — HPI
48yo female who presented to the ER with complaints of chest pain for the past 3-4 weeks. She reports her chest pain is constant in severity and is associated with hand tingling.She reports the episodes of chest pain do not last long. She contacted the cardiology clinic and was scheduled to be seen today (7/16/). She reports recurrent pain early this morning at 12:30 am but reports the tingling in her fingers was lasting longer prompting her to present to the ER. She reports her pain is constant and can increase with palpation or movement

## 2020-07-16 NOTE — ED TRIAGE NOTES
Pt. To the ER with c/o chest discomfort that radiates to the left shoulder with some tingling to the left hand. Pt. States she was diagnosed with Covid on 6/9 and she developed chest pain 3 weeks after having Covid. Pt. Denies a cardiac history, BP or DM. Pt. States she takes Aspirin 81 mg, Vitamin D and Vitamin C daily. Pt. Had a holtor monitor placed on yesterday. Pt. Placed on the continous cardiac, BP and pulse oximetry monitors. Skin is PWD. Bed in the low position, side rails x 2 and call light at the bedside.

## 2020-07-16 NOTE — ED NOTES
Assumed care/report received report Care. Pt AAOx4. Respirations even and unlabored. Pt VSS. Will continue to monitor.

## 2020-07-16 NOTE — PLAN OF CARE
met with patient to complete discharge assessment. Patient was alert and oriented. Prior to admission patient was independent. Patient lives at home with her spouse Brad Mei, [phone], and her dependent children  ages 11 and 21. Patient is employed with [employer] full-time and still drives. Patient has no medical equipment in the home or  services. Patient has no issues affording medications and has no social needs at this time.     SW provided patient with discharge brochure and wrote contact information on the white board. Sw encouraged patient to contact if any issues or needs arise. Patient verbalized understanding.       07/16/20 1137   Discharge Assessment   Assessment Type Discharge Planning Assessment   Confirmed/corrected address and phone number on facesheet? Yes   Assessment information obtained from? Patient   Prior to hospitilization cognitive status: Alert/Oriented   Prior to hospitalization functional status: Independent   Current cognitive status: Alert/Oriented   Current Functional Status: Independent   Lives With child(yani), dependent;spouse   Able to Return to Prior Arrangements yes   Is patient able to care for self after discharge? Yes   Who are your caregiver(s) and their phone number(s)? Brad Mei, [phone], spouse   Patient currently receives any other outside agency services? No   Equipment Currently Used at Home none   Do you have any problems affording any of your prescribed medications? No   Is the patient taking medications as prescribed? yes   Does the patient have transportation home? Yes   Transportation Anticipated family or friend will provide   Does the patient receive services at the [facility]? No   Discharge Plan A Home   Discharge Plan B Home with family   DME Needed Upon Discharge  none   Patient/Family in Agreement with Plan yes

## 2020-07-16 NOTE — ED NOTES
Pt. Resting comfortably in bed. Reports a slight headache and some pressure to her left side of her mouth. No facial droop noted and equal sensation to her face and arms.

## 2020-07-16 NOTE — HOSPITAL COURSE
7/16/2020 Presented to the ER with complaints of constant chest pain. HR and BP stable. EKG with no acute changes. Troponin negative x 2 (<.006-<.006)

## 2020-07-16 NOTE — H&P
[facility] ED Observation Unit  History and Physical      I       History of Present Illness:    The patient is a 49 y.o. female who presents to the ED with complaint of chest pain which onset gradually for 3-4 weeks. Symptoms are constant in severity. Associated sxs include tingling. Patient denies any fever, chills, nausea, vomiting, and all other sxs at this time. No prior Tx included. Patient reports she has been having chest pain radiating to her left arm not lasting long, and notes it has been on and off. She notes tonight at 12:30 am she had the same pain and symptoms, but notes she also had some tingling in her fingers all lasting 1 minute. She notes she was seen on 7/7 by a cardiologist and was back again yesterday morning and received a holter monitor. /89      has a past medical history of Allergy and History of ovarian cyst.  has a past surgical history that includes Dilation and curettage of uterus and Tubal ligation (Bilateral, 11/19/2008).   I  PMHx   Past Medical History:   Diagnosis Date    Allergy     History of ovarian cyst       Past Surgical History:   Procedure Laterality Date    DILATION AND CURETTAGE OF UTERUS      x3    TUBAL LIGATION Bilateral 11/19/2008        Family Hx   Family History   Problem Relation Age of Onset    Hypertension Mother     Mitral valve prolapse Mother     Diabetes Mellitus Paternal Grandfather     Heart attack Maternal Aunt     Breast cancer Neg Hx     Colon cancer Neg Hx     Ovarian cancer Neg Hx     Melanoma Neg Hx         Social Hx   Social History     Socioeconomic History    Marital status:      Spouse name: Not on file    Number of children: Not on file    Years of education: Not on file    Highest education level: Not on file   Occupational History    Occupation: nurse   Social Needs    Financial resource strain: Not on file    Food insecurity     Worry: Not on file     Inability: Not on file    Transportation needs     Medical: Not  on file     Non-medical: Not on file   Tobacco Use    Smoking status: Never Smoker    Smokeless tobacco: Never Used   Substance and Sexual Activity    Alcohol use: Yes     Comment: rare    Drug use: No    Sexual activity: Yes     Partners: Male   Lifestyle    Physical activity     Days per week: Not on file     Minutes per session: Not on file    Stress: Not on file   Relationships    Social connections     Talks on phone: Not on file     Gets together: Not on file     Attends Zoroastrian service: Not on file     Active member of club or organization: Not on file     Attends meetings of clubs or organizations: Not on file     Relationship status: Not on file   Other Topics Concern    Are you pregnant or think you may be? Not Asked    Breast-feeding Not Asked   Social History Narrative    Not on file        Vital Signs   Vitals:    07/16/20 0302 07/16/20 0332 07/16/20 0402 07/16/20 0413   BP: (!) 145/70 (!) 148/72 (!) 148/77    BP Location:       Patient Position:       Pulse: (!) 58 60 64    Resp: 15 (!) 22 15    Temp:    98 °F (36.7 °C)   TempSrc:    Oral   SpO2: 100% 100% 100%    Weight:       Height:            Review of Systems  ROS   Review of Systems   Constitutional: Negative for chills and fever.   HENT: Negative for sore throat.    Respiratory: Negative for cough and shortness of breath.    Cardiovascular: Positive for chest pain.   Gastrointestinal: Negative for nausea and vomiting.   Genitourinary: Negative for dysuria, frequency and urgency.   Musculoskeletal: Negative for back pain, neck pain and neck stiffness.   Skin: Negative for rash and wound.   Neurological: Negative for syncope and weakness. Positive for numbness  Hematological: Does not bruise/bleed easily.   Psychiatric/Behavioral: Negative for agitation, behavioral problems and confusion.     Brief Physical Exam/Reassessment   Physical Exam    Gen: NAD  Lungs: CTA  CV: RRR no m/r/g  GI: SND no TTP   Neuro: Alert       Medications:    Scheduled Meds:   aspirin  81 mg Oral Daily     Continuous Infusions:  PRN Meds:.      Assessment/Plan:  1. Chest pain  -ECG without acute ischemic change. First troponin negative  -Serial troponin   -Cardiology consult, appreciate recommendations     2. Arm numbness  --MRI brain without contrast  -Neuro consult, appreciate recommendations.     Case was discussed with the ED provider, Dr. Turcios.

## 2020-07-16 NOTE — ED PROVIDER NOTES
Encounter Date: 7/16/2020    SCRIBE #1 NOTE: I, Amanda Najera, am scribing for, and in the presence of,  Dr. Israel. I have scribed the entire note.       History     Chief Complaint   Patient presents with    Chest Pain     Pt presents with c/o intermittent chest pain x 3 weeks with left arm tingling that began and resolved today. Pt had a holter monitor placed today by her cardiologist       Time seen by provider: 1:43 AM on 07/16/2020    The patient is a 49 y.o. female who presents to the ED with complaint of chest pain which onset gradually for 3-4 weeks. Symptoms are constant in severity. Associated sxs include tingling. Patient denies any fever, chills, nausea, vomiting, and all other sxs at this time. No prior Tx included. Patient reports she has been having chest pain radiating to her left arm not lasting long, and notes it has been on and off. She notes tonight at 12:30 am she had the same pain and symptoms, but notes she also had some tingling in her fingers all lasting 1 minute. She notes she was seen on 7/7 by a cardiologist and was back again yesterday morning and received a holter monitor. /89     has a past medical history of Allergy and History of ovarian cyst.  has a past surgical history that includes Dilation and curettage of uterus and Tubal ligation (Bilateral, 11/19/2008).     The history is provided by the patient.     Review of patient's allergies indicates:  No Known Allergies  Past Medical History:   Diagnosis Date    Allergy     History of ovarian cyst      Past Surgical History:   Procedure Laterality Date    DILATION AND CURETTAGE OF UTERUS      x3    TUBAL LIGATION Bilateral 11/19/2008     Family History   Problem Relation Age of Onset    Hypertension Mother     Mitral valve prolapse Mother     Diabetes Mellitus Paternal Grandfather     Heart attack Maternal Aunt     Breast cancer Neg Hx     Colon cancer Neg Hx     Ovarian cancer Neg Hx     Melanoma Neg Hx       Social History     Tobacco Use    Smoking status: Never Smoker    Smokeless tobacco: Never Used   Substance Use Topics    Alcohol use: Yes     Comment: rare    Drug use: No     Review of Systems   Constitutional: Negative for chills and fever.   HENT: Negative for sore throat.    Respiratory: Negative for cough and shortness of breath.    Cardiovascular: Positive for chest pain.   Gastrointestinal: Negative for nausea and vomiting.   Genitourinary: Negative for dysuria, frequency and urgency.   Musculoskeletal: Negative for back pain, neck pain and neck stiffness.   Skin: Negative for rash and wound.   Neurological: Positive for numbness. Negative for syncope and weakness.   Hematological: Does not bruise/bleed easily.   Psychiatric/Behavioral: Negative for agitation, behavioral problems and confusion.       Physical Exam     Initial Vitals [07/16/20 0105]   BP Pulse Resp Temp SpO2   136/83 72 16 98.1 °F (36.7 °C) 97 %      MAP       --         Physical Exam    Nursing note and vitals reviewed.  Constitutional: She appears well-developed and well-nourished. She is not diaphoretic. No distress.   HENT:   Head: Normocephalic and atraumatic.   Mouth/Throat: Oropharynx is clear and moist.   Eyes: EOM are normal. Pupils are equal, round, and reactive to light.   Neck: Normal range of motion. Neck supple. No tracheal deviation present.   Cardiovascular: Normal rate, regular rhythm, normal heart sounds and intact distal pulses.   Pulmonary/Chest: Breath sounds normal. No stridor. No respiratory distress. She has no wheezes. She exhibits tenderness.   Diffuse left chest wall tenderness   Abdominal: Soft. Bowel sounds are normal. She exhibits no distension and no mass. There is no abdominal tenderness.   Musculoskeletal: Normal range of motion. Tenderness present. No edema.      Comments: Diffuse left shoulder tenderness   Neurological: She is alert and oriented to person, place, and time. She has normal strength. No  cranial nerve deficit or sensory deficit.   Skin: Skin is warm and dry. Capillary refill takes less than 2 seconds. No rash noted.   Psychiatric: She has a normal mood and affect. Her behavior is normal. Thought content normal.         ED Course   Procedures  Labs Reviewed   CBC W/ AUTO DIFFERENTIAL - Abnormal; Notable for the following components:       Result Value    Hematocrit 35.9 (*)     All other components within normal limits   COMPREHENSIVE METABOLIC PANEL   TROPONIN I   B-TYPE NATRIURETIC PEPTIDE   TROPONIN I     EKG Readings: (Independently Interpreted)   NSR; Rate 69; No ST segment changes; No STEMI; T wave inversion in V3 consistent with previous       Imaging Results    None          Medical Decision Making:   History:   Old Medical Records: I decided to obtain old medical records.  Differential Diagnosis:   Differential Diagnosis includes, but is not limited to:  ACS/MI, PE, aortic dissection, pneumothorax, cardiac tamponade, pericarditis/myocarditis, pneumonia, infection/abscess, lung mass, trauma/fracture, costochondritis/pleurisy, MSK pain/contusion, GERD, biliary disease, pancreatitis, anemia  Independently Interpreted Test(s):   I have ordered and independently interpreted EKG Reading(s) - see prior notes  Clinical Tests:   Lab Tests: Ordered and Reviewed  Radiological Study: Ordered and Reviewed  Medical Tests: Ordered and Reviewed  ED Management:  Patient with persistent symptoms despite flexeril. Reports tingling to all fingers. Neuro exam is non focal. Will place in EDOU.                    ED Course as of Jul 16 0336   Thu Jul 16, 2020   0146 EKG:  Rate of 69.  Normal sinus rhythm.  No ST segment changes.  No STEMI.  T-wave inversion in V 3 consistent with previous.    [RN]   0259 CBC auto differential(!) [RN]   0259 Brain natriuretic peptide [RN]   0259 Troponin I [RN]   0259 Patient reports chest pain onset couple hours prior to arrival.  On exam she is currently asymptomatic.  Her neuro  exam is nonfocal.  Will obtain labs 2 troponin rule out and reassess.      [RN]      ED Course User Index  [RN] Elie Israel Jr., MD                Clinical Impression:       ICD-10-CM ICD-9-CM   1. Chest pain  R07.9 786.50               scribe attest I, Elie Israel,  personally performed the services described in this documentation. All medical record entries made by the scribe were at my direction and in my presence.  I have reviewed the chart and agree that the record reflects my personal performance and is accurate and complete. Elie Israel M.D. 5:47 PM07/16/2020      Portions of this note were dictated using voice recognition software and may contain dictation related errors in spelling/grammar/syntax not found on text review                 Elie Israel Jr., MD  07/16/20 7405

## 2020-07-16 NOTE — CONSULTS
[facility]  Cardiology  Consult Note    Patient Name: Nisha Mei  MRN: [id number]  Admission Date: 7/16/2020  Hospital Length of Stay: 0 days  Code Status: Full Code   Attending Provider: No att. providers found   Consulting Provider: HENRIK Taylor, CASSIDY  Primary Care Physician: Alisa Thomas MD  Principal Problem:Chest pain    Patient information was obtained from patient, past medical records and ER records.     Inpatient consult to [facility]iology-[facility]  Consult performed by: HENRIK Reed, CASSIDY  Consult ordered by: Elie Israel Jr., MD  Reason for consult: chest pain         Subjective:     Chief Complaint:  Chest pain      HPI:   50yo female who presented to the ER with complaints of chest pain for the past 3-4 weeks. She reports her chest pain is constant in severity and is associated with hand tingling.She reports the episodes of chest pain do not last long. She contacted the cardiology clinic and was scheduled to be seen today (7/16/). She reports recurrent pain early this morning at 12:30 am but reports the tingling in her fingers was lasting longer prompting her to present to the ER. She reports her pain is constant and can increase with palpation or movement     Hospital Course:    7/16/2020 Presented to the ER with complaints of constant chest pain. HR and BP stable. EKG with no acute changes. Troponin negative x 2 (<.006-<.006)    Past Medical History:   Diagnosis Date    Allergy     History of ovarian cyst        Past Surgical History:   Procedure Laterality Date    DILATION AND CURETTAGE OF UTERUS      x3    TUBAL LIGATION Bilateral 11/19/2008       Review of patient's allergies indicates:  No Known Allergies    No current facility-administered medications on file prior to encounter.      Current Outpatient Medications on File Prior to Encounter   Medication Sig    aspirin (ECOTRIN) 81 MG EC tablet Take 81 mg by mouth once daily.    triamcinolone  acetonide 0.1% (KENALOG) 0.1 % ointment Apply topically 3 (three) times daily.     Family History     Problem Relation (Age of Onset)    Diabetes Mellitus Paternal Grandfather    Heart attack Maternal Aunt    Hypertension Mother    Mitral valve prolapse Mother        Tobacco Use    Smoking status: Never Smoker    Smokeless tobacco: Never Used   Substance and Sexual Activity    Alcohol use: Yes     Comment: rare    Drug use: No    Sexual activity: Yes     Partners: Male     Review of Systems   Constitution: Negative for chills, decreased appetite, diaphoresis, fever, malaise/fatigue, weight gain and weight loss.   Cardiovascular: Positive for chest pain. Negative for claudication, cyanosis, dyspnea on exertion, irregular heartbeat, leg swelling, near-syncope, orthopnea, palpitations, paroxysmal nocturnal dyspnea and syncope.   Respiratory: Negative for cough, shortness of breath, snoring, sputum production and wheezing.    Endocrine: Negative for cold intolerance, heat intolerance, polydipsia, polyphagia and polyuria.   Skin: Negative for color change, dry skin, itching, nail changes and poor wound healing.   Musculoskeletal: Negative for back pain, gout, joint pain and joint swelling.   Gastrointestinal: Negative for bloating, abdominal pain, constipation, diarrhea, hematemesis, hematochezia, melena, nausea and vomiting.   Genitourinary: Negative for dysuria, hematuria and nocturia.   Neurological: Negative for dizziness, headaches, light-headedness, numbness, paresthesias and weakness.   Psychiatric/Behavioral: Negative for altered mental status, depression and memory loss.     Objective:     Vital Signs (Most Recent):  Temp: 98 °F (36.7 °C) (07/16/20 0413)  Pulse: 60 (07/16/20 1116)  Resp: 18 (07/16/20 0958)  BP: 115/65 (07/16/20 0958)  SpO2: 100 % (07/16/20 1116) Vital Signs (24h Range):  Temp:  [98 °F (36.7 °C)-98.1 °F (36.7 °C)] 98 °F (36.7 °C)  Pulse:  [52-77] 60  Resp:  [15-35] 18  SpO2:  [97 %-100 %]  100 %  BP: (115-164)/(63-89) 115/65     Weight: 80.3 kg (177 lb)  Body mass index is 32.37 kg/m².    SpO2: 100 %  O2 Device (Oxygen Therapy): room air    No intake or output data in the 24 hours ending 07/16/20 1118    Lines/Drains/Airways     Peripheral Intravenous Line                 Peripheral IV - Single Lumen 07/16/20 0158 20 G Right Antecubital less than 1 day                Physical Exam   Constitutional: She is oriented to person, place, and time. She appears well-developed and well-nourished. No distress.   Cardiovascular: Normal rate and regular rhythm. Exam reveals no gallop.   No murmur heard.  Pulmonary/Chest: Effort normal and breath sounds normal. No respiratory distress. She has no wheezes.   Abdominal: Soft. Bowel sounds are normal. She exhibits no distension. There is no abdominal tenderness.   Musculoskeletal:         General: Tenderness (TTP to substernal area as well as left axilla ) present.   Neurological: She is alert and oriented to person, place, and time.   Skin: Skin is warm and dry.       Significant Labs:     Recent Labs   Lab 07/16/20  0158   WBC 6.11   RBC 4.03   HGB 12.5   HCT 35.9*      MCV 89   MCH 31.0   MCHC 34.8     Recent Labs   Lab 07/16/20  0158      K 4.0      CO2 24   BUN 12   CREATININE 0.8     Recent Labs   Lab 07/16/20  0445   TROPONINI <0.006           Assessment and Plan:     * Chest pain  - atypical in presentation  - constant and reproducible upon palpation  - EKG and troponin negative  - no risk factors for CAD; etiology currently is MSK in etiology with costrochondritis  - cleared for discharge from cardiac standpoint and instructed to follow up with Cardiology as previously scheduled         VTE Risk Mitigation (From admission, onward)         Ordered     IP VTE HIGH RISK PATIENT  Once      07/16/20 0601     Place sequential compression device  Until discontinued      07/16/20 0601              Cleared for discharge from cardiac standpoint.  Follow up with Dr. Garber as scheduled     Thank you for your consult. I will sign off. Please contact us if you have any additional questions.    HENRIK Taylor, ANP  Cardiology   [facility]

## 2020-07-16 NOTE — DISCHARGE SUMMARY
[facility] Hospital Medicine  Discharge Summary      Patient Name: Nisha Mei  MRN: [id number]  Admission Date: 7/16/2020  Hospital Length of Stay: 0 days  Discharge Date and Time:  07/16/2020 12:00 PM  Attending Physician: No att. providers found   Discharging Provider: Jacek Martines MD  Primary Care Provider: Alisa Thomas MD        HPI: The patient is a 49 y.o. female who presents to the ED with complaint of chest pain which onset gradually for 3-4 weeks. Symptoms are constant in severity. Associated sxs include tingling. Patient denies any fever, chills, nausea, vomiting, and all other sxs at this time. No prior Tx included. Patient reports she has been having chest pain radiating to her left arm not lasting long, and notes it has been on and off. She notes tonight at 12:30 am she had the same pain and symptoms, but notes she also had some tingling in her fingers all lasting 1 minute. She notes she was seen on 7/7 by a cardiologist and was back again yesterday morning and received a holter monitor. /89         Hospital Course:  Patient was admitted to the emergency department observation unit for further monitoring, as well as Cardiology and Neurology consults.  Brain MRI was performed showing no acute abnormalities.  Patient was deemed by both Cardiology and Neurology stable for discharge.  She has an upcoming appointment with Cardiology and a referral to Neurology will be placed.  She may return to the ED for any further urgent concerns.    Consults:   Consults (From admission, onward)        Status Ordering Provider     Inpatient consult to [facility]  Once     Provider:  Yves Lombardo MD    Completed ALEXANDRIA DENNISON JR     Neurology  Once     Provider:  Allie Nolan MD    Completed ALEXANDRIA DENNISON JR          Final Active Diagnoses:    Diagnosis Date Noted POA    PRINCIPAL PROBLEM:  Chest pain [R07.9] 09/18/2014 Unknown      Problems Resolved During  this Admission:      Discharged Condition: good    Disposition: Home or Self Care    Follow Up:  Follow-up Information     Lizabeth Garber MD.    Specialty: INTERVENTIONAL CARDIOLOGY  Contact information:  [address]  [address]  [place] [id number] [phone]                 Patient Instructions:   No discharge procedures on file.  Medications:  Reconciled Home Medications:      Medication List      ASK your doctor about these medications    aspirin 81 MG EC tablet  Commonly known as: ECOTRIN  Take 81 mg by mouth once daily.     triamcinolone acetonide 0.1% 0.1 % ointment  Commonly known as: KENALOG  Apply topically 3 (three) times daily.            Significant Diagnostic Studies: Labs: All labs within the past 24 hours have been reviewed    Pending Diagnostic Studies:     None        Indwelling Lines/Drains at time of discharge:   Lines/Drains/Airways     None                 Time spent on the discharge of patient: 30 minutes  Patient was seen and examined on the date of discharge and determined to be suitable for discharge.         Jacek Martines MD  Department of Hospital Medicine  [facility]

## 2020-07-16 NOTE — SUBJECTIVE & OBJECTIVE
Past Medical History:   Diagnosis Date    Allergy     History of ovarian cyst        Past Surgical History:   Procedure Laterality Date    DILATION AND CURETTAGE OF UTERUS      x3    TUBAL LIGATION Bilateral 11/19/2008       Review of patient's allergies indicates:  No Known Allergies    No current facility-administered medications on file prior to encounter.      Current Outpatient Medications on File Prior to Encounter   Medication Sig    aspirin (ECOTRIN) 81 MG EC tablet Take 81 mg by mouth once daily.    triamcinolone acetonide 0.1% (KENALOG) 0.1 % ointment Apply topically 3 (three) times daily.     Family History     Problem Relation (Age of Onset)    Diabetes Mellitus Paternal Grandfather    Heart attack Maternal Aunt    Hypertension Mother    Mitral valve prolapse Mother        Tobacco Use    Smoking status: Never Smoker    Smokeless tobacco: Never Used   Substance and Sexual Activity    Alcohol use: Yes     Comment: rare    Drug use: No    Sexual activity: Yes     Partners: Male     Review of Systems   Constitution: Negative for chills, decreased appetite, diaphoresis, fever, malaise/fatigue, weight gain and weight loss.   Cardiovascular: Positive for chest pain. Negative for claudication, cyanosis, dyspnea on exertion, irregular heartbeat, leg swelling, near-syncope, orthopnea, palpitations, paroxysmal nocturnal dyspnea and syncope.   Respiratory: Negative for cough, shortness of breath, snoring, sputum production and wheezing.    Endocrine: Negative for cold intolerance, heat intolerance, polydipsia, polyphagia and polyuria.   Skin: Negative for color change, dry skin, itching, nail changes and poor wound healing.   Musculoskeletal: Negative for back pain, gout, joint pain and joint swelling.   Gastrointestinal: Negative for bloating, abdominal pain, constipation, diarrhea, hematemesis, hematochezia, melena, nausea and vomiting.   Genitourinary: Negative for dysuria, hematuria and nocturia.    Neurological: Negative for dizziness, headaches, light-headedness, numbness, paresthesias and weakness.   Psychiatric/Behavioral: Negative for altered mental status, depression and memory loss.     Objective:     Vital Signs (Most Recent):  Temp: 98 °F (36.7 °C) (07/16/20 0413)  Pulse: 60 (07/16/20 1116)  Resp: 18 (07/16/20 0958)  BP: 115/65 (07/16/20 0958)  SpO2: 100 % (07/16/20 1116) Vital Signs (24h Range):  Temp:  [98 °F (36.7 °C)-98.1 °F (36.7 °C)] 98 °F (36.7 °C)  Pulse:  [52-77] 60  Resp:  [15-35] 18  SpO2:  [97 %-100 %] 100 %  BP: (115-164)/(63-89) 115/65     Weight: 80.3 kg (177 lb)  Body mass index is 32.37 kg/m².    SpO2: 100 %  O2 Device (Oxygen Therapy): room air    No intake or output data in the 24 hours ending 07/16/20 1118    Lines/Drains/Airways     Peripheral Intravenous Line                 Peripheral IV - Single Lumen 07/16/20 0158 20 G Right Antecubital less than 1 day                Physical Exam   Constitutional: She is oriented to person, place, and time. She appears well-developed and well-nourished. No distress.   Cardiovascular: Normal rate and regular rhythm. Exam reveals no gallop.   No murmur heard.  Pulmonary/Chest: Effort normal and breath sounds normal. No respiratory distress. She has no wheezes.   Abdominal: Soft. Bowel sounds are normal. She exhibits no distension. There is no abdominal tenderness.   Musculoskeletal:         General: Tenderness (TTP to substernal area as well as left axilla ) present.   Neurological: She is alert and oriented to person, place, and time.   Skin: Skin is warm and dry.       Significant Labs:     Recent Labs   Lab 07/16/20 0158   WBC 6.11   RBC 4.03   HGB 12.5   HCT 35.9*      MCV 89   MCH 31.0   MCHC 34.8     Recent Labs   Lab 07/16/20 0158      K 4.0      CO2 24   BUN 12   CREATININE 0.8     Recent Labs   Lab 07/16/20  0445   TROPONINI <0.006

## 2020-07-16 NOTE — CONSULTS
Neurology Consult and Evaluation Note;    Reason for consult: arm paresthesias    HPI:  49 y F who initially was experiencing chest pain for 3-4 weeks, and was set up with holter monitor outpatient with new onset paresthesias in left upper extremity.  Patient reports she had her holter monitor placed yesterday and noted a burning pain/soreness in left shoulder and soreness that was in left upper extremity radiating towards fingers and presenting as numbness in lateral three fingers but also in index finger.   Patient also complains of neck pain which has been chronic. She also reports the soreness in left shoulder is chronic.    ROS: 12 point ROS negative except as above    Past Medical History:   Diagnosis Date    Allergy     History of ovarian cyst      Past Surgical History:   Procedure Laterality Date    DILATION AND CURETTAGE OF UTERUS      x3    TUBAL LIGATION Bilateral 11/19/2008     Family History   Problem Relation Age of Onset    Hypertension Mother     Mitral valve prolapse Mother     Diabetes Mellitus Paternal Grandfather     Heart attack Maternal Aunt     Breast cancer Neg Hx     Colon cancer Neg Hx     Ovarian cancer Neg Hx     Melanoma Neg Hx      Social History     Socioeconomic History    Marital status:      Spouse name: Not on file    Number of children: Not on file    Years of education: Not on file    Highest education level: Not on file   Occupational History    Occupation: nurse   Social Needs    Financial resource strain: Not on file    Food insecurity     Worry: Not on file     Inability: Not on file    Transportation needs     Medical: Not on file     Non-medical: Not on file   Tobacco Use    Smoking status: Never Smoker    Smokeless tobacco: Never Used   Substance and Sexual Activity    Alcohol use: Yes     Comment: rare    Drug use: No    Sexual activity: Yes     Partners: Male   Lifestyle    Physical activity     Days per week: Not on file     Minutes per  session: Not on file    Stress: Not on file   Relationships    Social connections     Talks on phone: Not on file     Gets together: Not on file     Attends Protestant service: Not on file     Active member of club or organization: Not on file     Attends meetings of clubs or organizations: Not on file     Relationship status: Not on file   Other Topics Concern    Are you pregnant or think you may be? Not Asked    Breast-feeding Not Asked   Social History Narrative    Not on file     Review of patient's allergies indicates:  No Known Allergies    No current facility-administered medications on file prior to encounter.      Current Outpatient Medications on File Prior to Encounter   Medication Sig Dispense Refill    aspirin (ECOTRIN) 81 MG EC tablet Take 81 mg by mouth once daily.      triamcinolone acetonide 0.1% (KENALOG) 0.1 % ointment Apply topically 3 (three) times daily. 15 g 1       Current Facility-Administered Medications:     aspirin EC tablet 81 mg, 81 mg, Oral, Daily, Elie Israel Jr., MD    HYDROcodone-acetaminophen 5-325 mg per tablet 1 tablet, 1 tablet, Oral, Q6H PRN, Elie Israel Jr., MD    sodium chloride 0.9% flush 10 mL, 10 mL, Intravenous, PRN, Elie Israel Jr., MD    Current Outpatient Medications:     aspirin (ECOTRIN) 81 MG EC tablet, Take 81 mg by mouth once daily., Disp: , Rfl:     triamcinolone acetonide 0.1% (KENALOG) 0.1 % ointment, Apply topically 3 (three) times daily., Disp: 15 g, Rfl: 1      Exam:    Vitals:    07/16/20 0958   BP: 115/65   Pulse: (!) 52   Resp: 18   Temp:      GENERAL/CONSTITUTIONAL:   -Well appearing; well nourished     CV:  -NRRR; limbs warm and well-perfused     HIGHER INTEGRATIVE FUNCTIONS:   -Normal attention span and concentration  -Oriented to time, place and person  -Recent and remote memory intact   -Speech without dysarthria, no aphasia  -Normal fund of knowledge     CN:   -visual fields intact  -PERRL  -EOMI, no nystagmus  -Facial  sensation intact bilaterally  -Facial strength symmetrical  -Hearing normal BL  -Palate elevates symmetrically  -Normal shoulder shrug and head turn  -Tongue protrudes midline      MOTOR:   -Tone: normal in upper and lower extremities  -RUE/RLE 5/5  -LUE/LLE 5/5     SENSORY:   -Sensation normal bilaterally to light touch     REFLEXES:   -2+ bilateral symmetric   -Flexor plantar reflex bilaterally  -No clonus     GAIT & STATION:   Romberg negative  Gait within normal limits    Imaging and Laboratory results:  Recent Labs   Lab 07/16/20  0158   WBC 6.11   RBC 4.03   HGB 12.5   HCT 35.9*      MCV 89   MCH 31.0   MCHC 34.8     Recent Labs   Lab 07/16/20  0158   CALCIUM 9.3   PROT 7.0      K 4.0   CO2 24      BUN 12   CREATININE 0.8   ALKPHOS 57   ALT 13   AST 17   BILITOT 0.5     MRI brain without contrast:   1. No acute intracranial process.  2. Sinus disease.      Assessment and Recommendations:  49 y F with recent chest pain and holter monitor placement who experienced intermittent paresthesias in LUE. 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     Plan:  1) Reviewed MRI brain - no abnormalities  2) Can follow up with Neurology outpatient and obtain the following tests outpatient  -Labs: Hba1c, TSH, vitamin B12, RPR  No risk factors for any additional vitamin deficiencies  -MRI C spine without contrast can be done outpatient as patient c/o neck pain and paresthesias [I have ordered it to be done asap preferably prior to neurology clinic appt]. EMG/NCS of extremity if necessary will be determined by neurologist on follow up.  3) Physical Therapy outpatient.  4) Chest pain management per primary.    Neurology outpatient follow up with Dr. Kelsy Alfred MD. Phone number [phone] to be provided for patient to call clinic.

## 2020-07-16 NOTE — ASSESSMENT & PLAN NOTE
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

## 2020-07-17 LAB
OHS CV EVENT MONITOR DAY: 0
OHS CV HOLTER LENGTH DECIMAL HOURS: 47.98
OHS CV HOLTER LENGTH HOURS: 47
OHS CV HOLTER LENGTH MINUTES: 59

## 2020-07-20 ENCOUNTER — HOSPITAL ENCOUNTER (OUTPATIENT)
Dept: RADIOLOGY | Facility: HOSPITAL | Age: 50
Discharge: HOME | End: 2020-07-20
Attending: STUDENT IN AN ORGANIZED HEALTH CARE EDUCATION/TRAINING PROGRAM
Payer: COMMERCIAL

## 2020-07-20 DIAGNOSIS — M54.12 RADICULOPATHY, CERVICAL REGION: ICD-10-CM

## 2020-07-20 DIAGNOSIS — F41.9 ANXIETY: Primary | ICD-10-CM

## 2020-07-20 PROCEDURE — 72141 MRI NECK SPINE W/O DYE: CPT | Mod: TC

## 2020-07-20 PROCEDURE — 72141 MRI NECK SPINE W/O DYE: CPT | Mod: 26,,, | Performed by: RADIOLOGY

## 2020-07-20 PROCEDURE — 72141 MRI CERVICAL SPINE WITHOUT CONTRAST: ICD-10-PCS | Mod: 26,,, | Performed by: RADIOLOGY

## 2020-07-20 RX ORDER — ALPRAZOLAM 0.5 MG/1
TABLET ORAL
Qty: 2 TABLET | Refills: 0 | Status: SHIPPED | OUTPATIENT
Start: 2020-07-20 | End: 2021-11-09

## 2020-07-23 ENCOUNTER — PATIENT OUTREACH (OUTPATIENT)
Dept: ADMINISTRATIVE | Facility: OTHER | Age: 50
End: 2020-07-23

## 2020-07-23 NOTE — PROGRESS NOTES
Requested updates within Care Everywhere.  Patient's chart was reviewed for overdue FRANCES topics.  Immunizations reconciled.    Orders placed:  Tasked appts:  Labs Linked:

## 2020-07-24 ENCOUNTER — OFFICE VISIT (OUTPATIENT)
Dept: DERMATOLOGY | Facility: CLINIC | Age: 50
End: 2020-07-24
Payer: COMMERCIAL

## 2020-07-24 DIAGNOSIS — L57.0 AK (ACTINIC KERATOSIS): Primary | ICD-10-CM

## 2020-07-24 PROCEDURE — 99499 UNLISTED E&M SERVICE: CPT | Mod: S$GLB,,, | Performed by: DERMATOLOGY

## 2020-07-24 PROCEDURE — 99999 PR PBB SHADOW E&M-EST. PATIENT-LVL III: CPT | Mod: PBBFAC,,, | Performed by: DERMATOLOGY

## 2020-07-24 PROCEDURE — 17000 DESTRUCT PREMALG LESION: CPT | Mod: S$GLB,,, | Performed by: DERMATOLOGY

## 2020-07-24 PROCEDURE — 99499 NO LOS: ICD-10-PCS | Mod: S$GLB,,, | Performed by: DERMATOLOGY

## 2020-07-24 PROCEDURE — 17000 PR DESTRUCTION(LASER SURGERY,CRYOSURGERY,CHEMOSURGERY),PREMALIGNANT LESIONS,FIRST LESION: ICD-10-PCS | Mod: S$GLB,,, | Performed by: DERMATOLOGY

## 2020-07-24 PROCEDURE — 99999 PR PBB SHADOW E&M-EST. PATIENT-LVL III: ICD-10-PCS | Mod: PBBFAC,,, | Performed by: DERMATOLOGY

## 2020-07-24 NOTE — PROGRESS NOTES
Subjective:       Patient ID:  Nisha Mei is a 49 y.o. female who presents for   Chief Complaint   Patient presents with    Spot     R cheek      HPI   Pt presents today for spot on her R cheek , darker, itchy and raised tx: none. Spot has been there for months, but just became red and raised a few days ago. Initially itchy, but not now.    Review of Systems   Constitutional: Negative for fever, chills, weight loss, weight gain, fatigue, night sweats and malaise.   Respiratory: Negative for cough and shortness of breath.    Skin: Negative for daily sunscreen use, activity-related sunscreen use and wears hat.   Hematologic/Lymphatic: Does not bruise/bleed easily.        Objective:    Physical Exam   Constitutional: She appears well-developed and well-nourished.   Neurological: She is alert and oriented to person, place, and time.   Psychiatric: She has a normal mood and affect.   Skin:   Areas Examined (abnormalities noted in diagram):   Head / Face Inspection Performed              Diagram Legend     Erythematous scaling macule/papule c/w actinic keratosis       Vascular papule c/w angioma      Pigmented verrucoid papule/plaque c/w seborrheic keratosis      Yellow umbilicated papule c/w sebaceous hyperplasia      Irregularly shaped tan macule c/w lentigo     1-2 mm smooth white papules consistent with Milia      Movable subcutaneous cyst with punctum c/w epidermal inclusion cyst      Subcutaneous movable cyst c/w pilar cyst      Firm pink to brown papule c/w dermatofibroma      Pedunculated fleshy papule(s) c/w skin tag(s)      Evenly pigmented macule c/w junctional nevus     Mildly variegated pigmented, slightly irregular-bordered macule c/w mildly atypical nevus      Flesh colored to evenly pigmented papule c/w intradermal nevus       Pink pearly papule/plaque c/w basal cell carcinoma      Erythematous hyperkeratotic cursted plaque c/w SCC      Surgical scar with no sign of skin cancer recurrence       Open and closed comedones      Inflammatory papules and pustules      Verrucoid papule consistent consistent with wart     Erythematous eczematous patches and plaques     Dystrophic onycholytic nail with subungual debris c/w onychomycosis     Umbilicated papule    Erythematous-base heme-crusted tan verrucoid plaque consistent with inflamed seborrheic keratosis     Erythematous Silvery Scaling Plaque c/w Psoriasis     See annotation      Assessment / Plan:        AK (actinic keratosis)  Cryosurgery Procedure Note    Verbal consent from the patient is obtained including, but not limited to, risk of hypopigmentation/hyperpigmentation, scar, recurrence of lesion. The patient is aware of the precancerous quality and need for treatment of these lesions. Liquid nitrogen cryosurgery is applied to the 1 actinic keratosis, as detailed in the physical exam, to produce a freeze injury. The patient is aware that blisters may form and is instructed on wound care with gentle cleansing and use of vaseline ointment to keep moist until healed. The patient is supplied a handout on cryosurgery and is instructed to call if lesions do not completely resolve.    Follow up in about 4 weeks (around 8/21/2020).

## 2020-07-24 NOTE — PATIENT INSTRUCTIONS
CRYOSURGERY      Your doctor has used a method called cryosurgery to treat your skin condition. Cryosurgery refers to the use of very cold substances to treat a variety of skin conditions such as warts, pre-skin cancers, molluscum contagiosum, sun spots, and several benign growths. The substance we use in cryosurgery is liquid nitrogen and is so cold (-195 degrees Celsius) that is burns when administered.     Following treatment in the office, the skin may immediately burn and become red. You may find the area around the lesion is affected as well. It is sometimes necessary to treat not only the lesion, but a small area of the surrounding normal skin to achieve a good response.     A blister, and even a blood filled blister, may form after treatment.   This is a normal response. If the blister is painful, it is acceptable to sterilize a needle and with rubbing alcohol and gently pop the blister. It is important that you gently wash the area with soap and warm water as the blister fluid may contain wart virus if a wart was treated. Do no remove the roof of the blister.     The area treated can take anywhere from 1-3 weeks to heal. Healing time depends on the kind skin lesion treated, the location, and how aggressively the lesion was treated. It is recommended that the areas treated are covered with Vaseline or bacitracin ointment and a band-aid. If a band-aid is not practical, just ointment applied several times per day will do. Keeping these areas moist will speed the healing time.    Treatment with liquid nitrogen can leave a scar. In dark skin, it may be a light or dark scar, in light skin it may be a white or pink scar. These will generally fade with time, but may never go away completely.     If you have any concerns after your treatment, please feel free to call the office.       [address] [place] [address]/ [phone] FAX/ [email or web address]    Summer Sun Protection      The  [facility] would like to remind you of the importance of sun protection all year round and particularly during the summer when the suns rays are the strongest. It has been proven that both acute and chronic sun exposure damages our cells and leads to skin cancer. Beyond skin cancer, the sun causes 90% of the symptoms of pre-mature skin aging, including wrinkles, lentigines (brown spots), and thin, easily bruised skin. Proper sun protection can help prevent these unwanted conditions.    Many patients report that the dont go in the sun. It has been shown that the average person receives 18 hours of incidental sun exposure per week during activities such as walking through parking lots, driving, or sitting next to windows. This accumulates to several bad sunburns per year!    In choosing sunscreen, you want one that protects against both UVA and UVB rays. It is recommended that you use one of SPF 30 or higher. It is important to apply the sunscreen about 20 minutes prior to sun exposure. Most sunscreens are chemical sunscreens and a reaction must take place in the skin so that they are effective. If they are applied and then you are immediately exposed to the sun or start sweating, this reaction has not had time to take place and you are therefore unprotected. Sunscreen needs to be reapplied every 2 hours if you are participating in water sports or sweating. We recommend Elta MD or Neutrogena Ultra Sheer Dry Touch SPF 55 for daily use; however there are many options and it is most important for you to find one that you will use on a consistent basis.    If you have sensitive skin, you may do best with a sunscreen that contains only physical blockers such as titanium dioxide or zinc oxide. These are typically thicker and harder to apply, however they afford very good protection. Neutrogena Sensitive Skin, Blue Lizard Sensitive Skin (pink top) or Neutrogena Pure and Free are popular ones.      Aside from sunscreen, clothes with UV protection, wide brimmed hats, and sunglasses are other means of sun protection that we recommend.                        [facility]  [address]  [facility] [address]  Dept: [phone]  Dept Fax: [phone]

## 2020-07-24 NOTE — PROGRESS NOTES
Cardiology Clinic note    Subjective:   Patient ID:  Nisha Mei is a 49 y.o. female who is referred by Dr Thomas for CP    HPI:   CP:  Located left sided chest pain. Associated with nausea. Radiation to the left arm. Lasts for a few seconds. Occurs less frequently now (about a week apart). Does report NOLAND on exertion, not associated with CP.    Palpitations:   Occurs occasionally when at work, but not when at home    SH Tobacco Never used  FH No premature CAD    Patient Active Problem List    Diagnosis Date Noted    Chest pressure     Abnormal uterine bleeding (AUB) 12/28/2017    S/P Hysteroscopy D&C and Endometrial Ablation 12/28/2017    Overweight (BMI 25.0-29.9) 09/18/2014    Shortness of breath 09/18/2014    Chest pain 09/18/2014    Hypokalemia 09/18/2014       Patient's Medications   New Prescriptions    No medications on file   Previous Medications    ALPRAZOLAM (XANAX) 0.5 MG TABLET    Take one tablet PO 15 minutes before procedure. If still anxious at time of procedure, take another tablet PO X 1 dose    ASPIRIN (ECOTRIN) 81 MG EC TABLET    Take 81 mg by mouth once daily.    TRIAMCINOLONE ACETONIDE 0.1% (KENALOG) 0.1 % OINTMENT    Apply topically 3 (three) times daily.   Modified Medications    No medications on file   Discontinued Medications    No medications on file        Review of Systems   Constitution: Negative for fever.   HENT: Negative for nosebleeds.    Cardiovascular: Negative for leg swelling, near-syncope, orthopnea, palpitations, paroxysmal nocturnal dyspnea and syncope.        As above   Respiratory: Negative for hemoptysis.    Hematologic/Lymphatic: Negative for bleeding problem. Does not bruise/bleed easily.   Musculoskeletal: Negative for arthritis.   Gastrointestinal: Negative for hematochezia and melena.   Genitourinary: Negative for hematuria.   Neurological: Negative for seizures.   Allergic/Immunologic: Positive for environmental allergies.         Objective:  "  Vitals  Vitals:    07/27/20 1609 07/27/20 1616   BP: 132/79 121/84   Pulse: 82 84   SpO2: 96%    Weight: 79 kg (174 lb 2.6 oz)           Physical Exam   Constitutional: She appears well-developed. No distress.   Eyes: Conjunctivae are normal.   Neck: Normal range of motion. No JVD present.   Cardiovascular: Normal rate and regular rhythm. Exam reveals no gallop and no friction rub.   No murmur heard.  Pulmonary/Chest: Effort normal and breath sounds normal. No respiratory distress.   Abdominal: Soft. Bowel sounds are normal. There is no abdominal tenderness.   Musculoskeletal:         General: No edema.   Neurological: She is alert.   Skin: Skin is warm. She is not diaphoretic.           Assessment:     1. Palpitations    2. Chest pain, unspecified type    3. Obesity, unspecified classification, unspecified obesity type, unspecified whether serious comorbidity present    4. Palpitations Active   5. NOLAND (dyspnea on exertion)        Plan:   Nisha Mei is a 49 y.o. female with no significant cardiac history    EKG personally reviewed  7/16/20: NSR 60s, normal axis.     CP, NOLAND  CP and NOLAND as noted above  PET Stress  Echo  Aspirin  Lipid profile check    Palpitations  Continues to have frequent episodes throughout the day while at work  Holter July 2020: NSR avg 68 bpm. Very rare PVCs, PACs. Longest RR 1500 msec. Symptoms correlated with NSR and STac  bpm (this was while having CP)  Reports was not at work when wearing the Holter - and hence did not have the palpiations    Obesity  Body mass index is 31.85 kg/m².  Estimated body mass index is 31.85 kg/m² as calculated from the following:    Height as of 7/16/20: 5' 2" (1.575 m).    Weight as of this encounter: 79 kg (174 lb 2.6 oz).  Discussed diet and exercise    Continue with current medical plan and lifestyle changes.    Orders Placed This Encounter   Procedures    Lipid Panel     fasting     Standing Status:   Future     Standing Expiration Date: "   9/25/2021    Holter monitor - 24 hour     Standing Status:   Future     Standing Expiration Date:   7/27/2021    Cardiac PET Scan Stress     Standing Status:   Future     Standing Expiration Date:   7/27/2021     Order Specific Question:   Which medicaton for the stress procedure?     Answer:   Regadenoson    Echo Color Flow Doppler? Yes     Standing Status:   Future     Standing Expiration Date:   7/27/2021     Order Specific Question:   Color Flow Doppler?     Answer:   Yes       Follow up as scheduled  Return sooner for concerns or questions. If symptoms persist go to the ED    She expressed verbal understanding and agreed with the plan      Lizabeth Garber MD  Interventional Cardiology  [facility]  Phone: [phone]

## 2020-07-27 ENCOUNTER — OFFICE VISIT (OUTPATIENT)
Dept: CARDIOLOGY | Facility: CLINIC | Age: 50
End: 2020-07-27
Payer: COMMERCIAL

## 2020-07-27 VITALS
OXYGEN SATURATION: 96 % | WEIGHT: 174.19 LBS | BODY MASS INDEX: 31.85 KG/M2 | SYSTOLIC BLOOD PRESSURE: 121 MMHG | DIASTOLIC BLOOD PRESSURE: 84 MMHG | HEART RATE: 84 BPM

## 2020-07-27 DIAGNOSIS — R00.2 PALPITATIONS: ICD-10-CM

## 2020-07-27 DIAGNOSIS — R00.2 PALPITATIONS: Primary | ICD-10-CM

## 2020-07-27 DIAGNOSIS — R06.09 DOE (DYSPNEA ON EXERTION): ICD-10-CM

## 2020-07-27 DIAGNOSIS — R07.9 CHEST PAIN, UNSPECIFIED TYPE: ICD-10-CM

## 2020-07-27 DIAGNOSIS — E66.9 OBESITY, UNSPECIFIED CLASSIFICATION, UNSPECIFIED OBESITY TYPE, UNSPECIFIED WHETHER SERIOUS COMORBIDITY PRESENT: ICD-10-CM

## 2020-07-27 PROCEDURE — 99245 OFF/OP CONSLTJ NEW/EST HI 55: CPT | Mod: S$GLB,,, | Performed by: INTERNAL MEDICINE

## 2020-07-27 PROCEDURE — 99999 PR PBB SHADOW E&M-EST. PATIENT-LVL III: CPT | Mod: PBBFAC,,, | Performed by: INTERNAL MEDICINE

## 2020-07-27 PROCEDURE — 99999 PR PBB SHADOW E&M-EST. PATIENT-LVL III: ICD-10-PCS | Mod: PBBFAC,,, | Performed by: INTERNAL MEDICINE

## 2020-07-27 PROCEDURE — 99245 PR OFFICE CONSULTATION,LEVEL V: ICD-10-PCS | Mod: S$GLB,,, | Performed by: INTERNAL MEDICINE

## 2020-07-27 NOTE — LETTER
July 27, 2020      Alisa Thomas MD  [address]  [place] [address]           [facility]  [address]  [facility] [address]  Phone: [phone]          Patient: Nisha Mei   MR Number: [id number]   YOB: 1970   Date of Visit: 7/27/2020       Dear Dr. Alisa Thomas:    Thank you for referring Nisha Mei to me for evaluation. Attached you will find relevant portions of my assessment and plan of care.    If you have questions, please do not hesitate to call me. I look forward to following Nisha Mei along with you.    Sincerely,    Lizabeth Garber MD    Enclosure  CC:  No Recipients    If you would like to receive this communication electronically, please contact [email or web address] or [phone] to request more information on [employer] access.    For providers and/or their staff who would like to refer a patient to [facility], please contact us through our one-stop-shop provider referral line, [facility], at [phone].    If you feel you have received this communication in error or would no longer like to receive these types of communications, please e-mail [email or web address]

## 2020-07-28 ENCOUNTER — TELEPHONE (OUTPATIENT)
Dept: CARDIOLOGY | Facility: CLINIC | Age: 50
End: 2020-07-28

## 2020-07-28 NOTE — TELEPHONE ENCOUNTER
Called the pt to follow up from office visit with Dr. Garber yesterday.  The pt did not answer, but I left a detailed voice message as well as a call back number.    ND

## 2020-07-31 ENCOUNTER — HOSPITAL ENCOUNTER (OUTPATIENT)
Dept: CARDIOLOGY | Facility: HOSPITAL | Age: 50
Discharge: HOME | End: 2020-07-31
Attending: INTERNAL MEDICINE
Payer: COMMERCIAL

## 2020-07-31 VITALS — WEIGHT: 174 LBS | HEIGHT: 62 IN | BODY MASS INDEX: 32.02 KG/M2

## 2020-07-31 DIAGNOSIS — R06.09 DOE (DYSPNEA ON EXERTION): ICD-10-CM

## 2020-07-31 DIAGNOSIS — R00.2 PALPITATIONS: ICD-10-CM

## 2020-07-31 LAB
AORTIC ROOT ANNULUS: 2.57 CM
AORTIC VALVE CUSP SEPERATION: 1.89 CM
AV INDEX (PROSTH): 1.05
AV MEAN GRADIENT: 4 MMHG
AV PEAK GRADIENT: 8 MMHG
AV VALVE AREA: 3.3 CM2
AV VELOCITY RATIO: 0.91
BSA FOR ECHO PROCEDURE: 1.86 M2
CV ECHO LV RWT: 0.53 CM
DOP CALC AO PEAK VEL: 1.37 M/S
DOP CALC AO VTI: 23.98 CM
DOP CALC LVOT AREA: 3.1 CM2
DOP CALC LVOT DIAMETER: 2 CM
DOP CALC LVOT PEAK VEL: 1.25 M/S
DOP CALC LVOT STROKE VOLUME: 79.07 CM3
DOP CALCLVOT PEAK VEL VTI: 25.18 CM
E WAVE DECELERATION TIME: 177.06 MSEC
E/A RATIO: 0.95
E/E' RATIO: 8.56 M/S
ECHO LV POSTERIOR WALL: 1.09 CM (ref 0.6–1.1)
FRACTIONAL SHORTENING: 24 % (ref 28–44)
INTERVENTRICULAR SEPTUM: 0.83 CM (ref 0.6–1.1)
IVRT: 64.7 MSEC
LA MAJOR: 4.15 CM
LA MINOR: 4.25 CM
LA WIDTH: 3.2 CM
LEFT ATRIUM SIZE: 2.88 CM
LEFT ATRIUM VOLUME INDEX: 18.3 ML/M2
LEFT ATRIUM VOLUME: 32.9 CM3
LEFT INTERNAL DIMENSION IN SYSTOLE: 3.12 CM (ref 2.1–4)
LEFT VENTRICLE DIASTOLIC VOLUME INDEX: 41.23 ML/M2
LEFT VENTRICLE DIASTOLIC VOLUME: 74.29 ML
LEFT VENTRICLE MASS INDEX: 69 G/M2
LEFT VENTRICLE SYSTOLIC VOLUME INDEX: 21.4 ML/M2
LEFT VENTRICLE SYSTOLIC VOLUME: 38.54 ML
LEFT VENTRICULAR INTERNAL DIMENSION IN DIASTOLE: 4.1 CM (ref 3.5–6)
LEFT VENTRICULAR MASS: 124.77 G
LV LATERAL E/E' RATIO: 7.7 M/S
LV SEPTAL E/E' RATIO: 9.63 M/S
MV PEAK A VEL: 0.81 M/S
MV PEAK E VEL: 0.77 M/S
MV STENOSIS PRESSURE HALF TIME: 51.35 MS
MV VALVE AREA P 1/2 METHOD: 4.28 CM2
PISA TR MAX VEL: 1.06 M/S
PULM VEIN S/D RATIO: 1.57
PV PEAK D VEL: 0.42 M/S
PV PEAK S VEL: 0.66 M/S
PV PEAK VELOCITY: 1.34 CM/S
RA MAJOR: 4.27 CM
RA PRESSURE: 3 MMHG
RA WIDTH: 3.26 CM
RIGHT VENTRICULAR END-DIASTOLIC DIMENSION: 2.44 CM
TDI LATERAL: 0.1 M/S
TDI SEPTAL: 0.08 M/S
TDI: 0.09 M/S
TR MAX PG: 4 MMHG
TV REST PULMONARY ARTERY PRESSURE: 7 MMHG

## 2020-07-31 PROCEDURE — 93227 HOLTER MONITOR - 24 HOUR (CUPID ONLY): ICD-10-PCS | Mod: ,,, | Performed by: INTERNAL MEDICINE

## 2020-07-31 PROCEDURE — 93306 ECHO (CUPID ONLY): ICD-10-PCS | Mod: 26,,, | Performed by: INTERNAL MEDICINE

## 2020-07-31 PROCEDURE — 93306 TTE W/DOPPLER COMPLETE: CPT | Mod: 26,,, | Performed by: INTERNAL MEDICINE

## 2020-07-31 PROCEDURE — 93225 XTRNL ECG REC<48 HRS REC: CPT

## 2020-07-31 PROCEDURE — 93227 XTRNL ECG REC<48 HR R&I: CPT | Mod: ,,, | Performed by: INTERNAL MEDICINE

## 2020-07-31 PROCEDURE — 93306 TTE W/DOPPLER COMPLETE: CPT

## 2020-08-03 ENCOUNTER — TELEPHONE (OUTPATIENT)
Dept: CARDIOLOGY | Facility: CLINIC | Age: 50
End: 2020-08-03

## 2020-08-03 NOTE — TELEPHONE ENCOUNTER
----- Message from Lizabeth Garber MD sent at 7/31/2020  5:25 PM CDT -----  Please call patient re normal echo

## 2020-08-04 LAB
OHS CV EVENT MONITOR DAY: 0
OHS CV HOLTER LENGTH DECIMAL HOURS: 23.98
OHS CV HOLTER LENGTH HOURS: 23
OHS CV HOLTER LENGTH MINUTES: 59

## 2020-08-04 NOTE — TELEPHONE ENCOUNTER
Called pt to inform of results.  The pt did not answer, but I left a detailed voice message as well as a call back number.    ND

## 2020-08-05 ENCOUNTER — TELEPHONE (OUTPATIENT)
Dept: CARDIOLOGY | Facility: CLINIC | Age: 50
End: 2020-08-05

## 2020-08-05 ENCOUNTER — LAB VISIT (OUTPATIENT)
Dept: LAB | Facility: HOSPITAL | Age: 50
End: 2020-08-05
Attending: INTERNAL MEDICINE
Payer: COMMERCIAL

## 2020-08-05 DIAGNOSIS — R07.9 CHEST PAIN, UNSPECIFIED TYPE: ICD-10-CM

## 2020-08-05 LAB
CHOLEST SERPL-MCNC: 183 MG/DL (ref 120–199)
CHOLEST/HDLC SERPL: 2.7 {RATIO} (ref 2–5)
HDLC SERPL-MCNC: 68 MG/DL (ref 40–75)
HDLC SERPL: 37.2 % (ref 20–50)
LDLC SERPL CALC-MCNC: 101.4 MG/DL (ref 63–159)
NONHDLC SERPL-MCNC: 115 MG/DL
TRIGL SERPL-MCNC: 68 MG/DL (ref 30–150)

## 2020-08-05 PROCEDURE — 80061 LIPID PANEL: CPT

## 2020-08-05 PROCEDURE — 36415 COLL VENOUS BLD VENIPUNCTURE: CPT

## 2020-08-05 NOTE — TELEPHONE ENCOUNTER
I called the pt to follow up and schedule the NM stress test.  The pt did not answer, but I left a detailed voice message as well as a call back number.    ND

## 2020-08-07 ENCOUNTER — TELEPHONE (OUTPATIENT)
Dept: CARDIOLOGY | Facility: CLINIC | Age: 50
End: 2020-08-07

## 2020-08-07 ENCOUNTER — PATIENT MESSAGE (OUTPATIENT)
Dept: CARDIOLOGY | Facility: CLINIC | Age: 50
End: 2020-08-07

## 2020-08-07 NOTE — TELEPHONE ENCOUNTER
----- Message from Lizabeth Garber MD sent at 8/7/2020  8:22 AM CDT -----  Based on the cholesterol results, the 10 year risk of ASCVD is low (under 1%)

## 2020-08-07 NOTE — TELEPHONE ENCOUNTER
----- Message from Lizabeth Garber MD sent at 8/7/2020  8:23 AM CDT -----  Please call patient re unremarkable monitor results. Some extra beats, but her symptoms were not related to these. She has normal rhythm when she experiences the palpitations.

## 2020-08-07 NOTE — TELEPHONE ENCOUNTER
Called the pt to follow up and schedule the NM test but the pt did not answer. I left a detailed voice message as well as a call back number.    ND

## 2020-08-07 NOTE — PROGRESS NOTES
Please call patient re unremarkable monitor results. Some extra beats, but her symptoms were not related to these. She has normal rhythm when she experiences the palpitations.

## 2020-08-18 ENCOUNTER — HOSPITAL ENCOUNTER (OUTPATIENT)
Dept: RADIOLOGY | Facility: HOSPITAL | Age: 50
Discharge: HOME | End: 2020-08-18
Attending: INTERNAL MEDICINE
Payer: COMMERCIAL

## 2020-08-18 ENCOUNTER — HOSPITAL ENCOUNTER (OUTPATIENT)
Dept: CARDIOLOGY | Facility: HOSPITAL | Age: 50
Discharge: HOME | End: 2020-08-18
Attending: INTERNAL MEDICINE
Payer: COMMERCIAL

## 2020-08-18 DIAGNOSIS — R07.9 CHEST PAIN, UNSPECIFIED TYPE: ICD-10-CM

## 2020-08-18 LAB
CV STRESS BASE HR: 60 BPM
DIASTOLIC BLOOD PRESSURE: 77 MMHG
OHS CV CPX 1 MINUTE RECOVERY HEART RATE: 130 BPM
OHS CV CPX 85 PERCENT MAX PREDICTED HEART RATE MALE: 139
OHS CV CPX ESTIMATED METS: 10
OHS CV CPX MAX PREDICTED HEART RATE: 164
OHS CV CPX PATIENT IS FEMALE: 1
OHS CV CPX PATIENT IS MALE: 0
OHS CV CPX PEAK DIASTOLIC BLOOD PRESSURE: 90 MMHG
OHS CV CPX PEAK HEAR RATE: 166 BPM
OHS CV CPX PEAK RATE PRESSURE PRODUCT: NORMAL
OHS CV CPX PEAK SYSTOLIC BLOOD PRESSURE: 149 MMHG
OHS CV CPX PERCENT MAX PREDICTED HEART RATE ACHIEVED: 101
OHS CV CPX RATE PRESSURE PRODUCT PRESENTING: 7740
STRESS ECHO POST EXERCISE DUR MIN: 9 MINUTES
STRESS ECHO POST EXERCISE DUR SEC: 0 SECONDS
SYSTOLIC BLOOD PRESSURE: 129 MMHG

## 2020-08-18 PROCEDURE — 93018 CV STRESS TEST I&R ONLY: CPT | Mod: ,,, | Performed by: INTERNAL MEDICINE

## 2020-08-18 PROCEDURE — 93017 CV STRESS TEST TRACING ONLY: CPT

## 2020-08-18 PROCEDURE — 93016 CV STRESS TEST SUPVJ ONLY: CPT | Mod: ,,, | Performed by: INTERNAL MEDICINE

## 2020-08-18 PROCEDURE — 78452 HT MUSCLE IMAGE SPECT MULT: CPT | Mod: 26,,, | Performed by: RADIOLOGY

## 2020-08-18 PROCEDURE — A9502 TC99M TETROFOSMIN: HCPCS

## 2020-08-18 PROCEDURE — 93018 NUCLEAR STRESS TEST (CUPID ONLY): ICD-10-PCS | Mod: ,,, | Performed by: INTERNAL MEDICINE

## 2020-08-18 PROCEDURE — 93016 NUCLEAR STRESS TEST (CUPID ONLY): ICD-10-PCS | Mod: ,,, | Performed by: INTERNAL MEDICINE

## 2020-08-18 PROCEDURE — 78452 NM MYOCARDIAL PERFUSION SPECT MULTI STUDY: ICD-10-PCS | Mod: 26,,, | Performed by: RADIOLOGY

## 2020-08-21 ENCOUNTER — OFFICE VISIT (OUTPATIENT)
Dept: DERMATOLOGY | Facility: CLINIC | Age: 50
End: 2020-08-21
Payer: COMMERCIAL

## 2020-08-21 DIAGNOSIS — D48.5 NEOPLASM OF UNCERTAIN BEHAVIOR OF SKIN: Primary | ICD-10-CM

## 2020-08-21 PROCEDURE — 88342 CHG IMMUNOCYTOCHEMISTRY: ICD-10-PCS | Mod: 26,,, | Performed by: PATHOLOGY

## 2020-08-21 PROCEDURE — 99499 NO LOS: ICD-10-PCS | Mod: S$GLB,,, | Performed by: DERMATOLOGY

## 2020-08-21 PROCEDURE — 11105 PUNCH BX SKIN EA SEP/ADDL: CPT | Mod: S$GLB,,, | Performed by: DERMATOLOGY

## 2020-08-21 PROCEDURE — 88305 TISSUE EXAM BY PATHOLOGIST: CPT | Performed by: PATHOLOGY

## 2020-08-21 PROCEDURE — 99999 PR PBB SHADOW E&M-EST. PATIENT-LVL III: CPT | Mod: PBBFAC,,, | Performed by: DERMATOLOGY

## 2020-08-21 PROCEDURE — 88341 IMHCHEM/IMCYTCHM EA ADD ANTB: CPT | Mod: 26,,, | Performed by: PATHOLOGY

## 2020-08-21 PROCEDURE — 11105 PR PUNCH BIOPSY, SKIN, EA ADDTL LESION: ICD-10-PCS | Mod: S$GLB,,, | Performed by: DERMATOLOGY

## 2020-08-21 PROCEDURE — 88305 TISSUE EXAM BY PATHOLOGIST: ICD-10-PCS | Mod: 26,,, | Performed by: PATHOLOGY

## 2020-08-21 PROCEDURE — 88341 IMHCHEM/IMCYTCHM EA ADD ANTB: CPT | Performed by: PATHOLOGY

## 2020-08-21 PROCEDURE — 88305 TISSUE EXAM BY PATHOLOGIST: CPT | Mod: 26,,, | Performed by: PATHOLOGY

## 2020-08-21 PROCEDURE — 88342 IMHCHEM/IMCYTCHM 1ST ANTB: CPT | Mod: 59 | Performed by: PATHOLOGY

## 2020-08-21 PROCEDURE — 88342 IMHCHEM/IMCYTCHM 1ST ANTB: CPT | Mod: 26,,, | Performed by: PATHOLOGY

## 2020-08-21 PROCEDURE — 99999 PR PBB SHADOW E&M-EST. PATIENT-LVL III: ICD-10-PCS | Mod: PBBFAC,,, | Performed by: DERMATOLOGY

## 2020-08-21 PROCEDURE — 99499 UNLISTED E&M SERVICE: CPT | Mod: S$GLB,,, | Performed by: DERMATOLOGY

## 2020-08-21 PROCEDURE — 11104 PUNCH BX SKIN SINGLE LESION: CPT | Mod: S$GLB,,, | Performed by: DERMATOLOGY

## 2020-08-21 PROCEDURE — 11104 PR PUNCH BIOPSY, SKIN, SINGLE LESION: ICD-10-PCS | Mod: S$GLB,,, | Performed by: DERMATOLOGY

## 2020-08-21 PROCEDURE — 88341 PR IHC OR ICC EACH ADD'L SINGLE ANTIBODY  STAINPR: ICD-10-PCS | Mod: 26,,, | Performed by: PATHOLOGY

## 2020-08-21 NOTE — PROGRESS NOTES
Subjective:       Patient ID:  Nisha Mei is a 49 y.o. female who presents for   Chief Complaint   Patient presents with    Lesion     HPI  Pt here today for f/u after LN2 on cheek last visit. Spot still has some discoloration. No symptoms.    Also concerned about a brown spot under L eye which has been present for the same amount of time (roughly 6 mo). Asymptomatic and no prior treatment.      Review of Systems   Constitutional: Negative for fever and chills.   Skin: Negative for itching and rash.        Objective:    Physical Exam   Constitutional: She appears well-developed and well-nourished. No distress.   Neurological: She is alert and oriented to person, place, and time. She is not disoriented.   Psychiatric: She has a normal mood and affect.   Skin:   Areas Examined (abnormalities noted in diagram):   Head / Face Inspection Performed                  Diagram Legend     Erythematous scaling macule/papule c/w actinic keratosis       Vascular papule c/w angioma      Pigmented verrucoid papule/plaque c/w seborrheic keratosis      Yellow umbilicated papule c/w sebaceous hyperplasia      Irregularly shaped tan macule c/w lentigo     1-2 mm smooth white papules consistent with Milia      Movable subcutaneous cyst with punctum c/w epidermal inclusion cyst      Subcutaneous movable cyst c/w pilar cyst      Firm pink to brown papule c/w dermatofibroma      Pedunculated fleshy papule(s) c/w skin tag(s)      Evenly pigmented macule c/w junctional nevus     Mildly variegated pigmented, slightly irregular-bordered macule c/w mildly atypical nevus      Flesh colored to evenly pigmented papule c/w intradermal nevus       Pink pearly papule/plaque c/w basal cell carcinoma      Erythematous hyperkeratotic cursted plaque c/w SCC      Surgical scar with no sign of skin cancer recurrence      Open and closed comedones      Inflammatory papules and pustules      Verrucoid papule consistent consistent with wart      Erythematous eczematous patches and plaques     Dystrophic onycholytic nail with subungual debris c/w onychomycosis     Umbilicated papule    Erythematous-base heme-crusted tan verrucoid plaque consistent with inflamed seborrheic keratosis     Erythematous Silvery Scaling Plaque c/w Psoriasis     See annotation      Assessment / Plan:      Neoplasm of uncertain behavior of skin  Punch biopsy procedure note:  Punch biopsy x 2 performed after verbal consent obtained. Areas marked and prepped with alcohol. Approximately 1cc of 1% lidocaine with epinephrine injected. 3 mm disposable punch used to remove lesions. Hemostasis obtained and biopsy sites closed with 1 - 2 Prolene sutures each. Wound care instructions reviewed with patient and handout given.  -     Specimen to Pathology, Dermatology  Pathology Orders:     Normal Orders This Visit    Specimen to Pathology, Dermatology     Comments:    Number of Specimens:->2  ------------------------->-------------------------  Spec 1 Procedure:->Biopsy  Spec 1 Clinical Impression:->r/o pigmented AK vs lentigo vs  lentigo maligna vs other  Spec 1 Source:->L lower cheek  ------------------------->-------------------------  Spec 2 Procedure:->Biopsy  Spec 2 Clinical Impression:->r/o SK vs atypical nevus vs  other  Spec 2 Source:->L upper cheek    Questions:    Procedure Type: Dermatology and skin neoplasms    Number of Specimens: 2    ------------------------: -------------------------    Spec 1 Procedure: Biopsy    Spec 1 Clinical Impression: r/o pigmented AK vs lentigo vs lentigo maligna vs other    Spec 1 Source: L lower cheek    ------------------------: -------------------------    Spec 2 Procedure: Biopsy    Spec 2 Clinical Impression: r/o SK vs atypical nevus vs other    Spec 2 Source: L upper cheek          Follow up in about 1 week (around 8/28/2020) for suture removal.

## 2020-08-21 NOTE — PATIENT INSTRUCTIONS
Summer Sun Protection      The [facility] would like to remind you of the importance of sun protection all year round and particularly during the summer when the suns rays are the strongest. It has been proven that both acute and chronic sun exposure damages our cells and leads to skin cancer. Beyond skin cancer, the sun causes 90% of the symptoms of pre-mature skin aging, including wrinkles, lentigines (brown spots), and thin, easily bruised skin. Proper sun protection can help prevent these unwanted conditions.    Many patients report that the dont go in the sun. It has been shown that the average person receives 18 hours of incidental sun exposure per week during activities such as walking through parking lots, driving, or sitting next to windows. This accumulates to several bad sunburns per year!    In choosing sunscreen, you want one that protects against both UVA and UVB rays. It is recommended that you use one of SPF 30 or higher. It is important to apply the sunscreen about 20 minutes prior to sun exposure. Most sunscreens are chemical sunscreens and a reaction must take place in the skin so that they are effective. If they are applied and then you are immediately exposed to the sun or start sweating, this reaction has not had time to take place and you are therefore unprotected. Sunscreen needs to be reapplied every 2 hours if you are participating in water sports or sweating. We recommend Elta MD or Neutrogena Ultra Sheer Dry Touch SPF 55 for daily use; however there are many options and it is most important for you to find one that you will use on a consistent basis.    If you have sensitive skin, you may do best with a sunscreen that contains only physical blockers such as titanium dioxide or zinc oxide. These are typically thicker and harder to apply, however they afford very good protection. Neutrogena Sensitive Skin, Blue Lizard Sensitive Skin (pink top) or Neutrogena Pure  "and Free are popular ones.     Aside from sunscreen, clothes with UV protection, wide brimmed hats, and sunglasses are other means of sun protection that we recommend.                        [facility] [address]  [place] [address]  Dept: [phone]  Dept Fax: [phone]                                                                               Punch Biopsy Wound Care    Your doctor has performed a punch biopsy today.  A band aid and antibiotic ointment has been placed over the site.  This should remain in place for 24 hours.  It is recommended that you keep the area dry for the first 24 hours.  After 24 hours, you may remove the band aid and wash the area with warm soap and water and apply Vaseline jelly.  Many patients prefer to use Neosporin or Bacitracin ointment.  This is acceptable; however know that you can develop an allergy to this medication even if you have used it safely for years.  It is important to keep the area moist.  Letting it dry out and get air slows healing time, will worsen the scar, and make it more difficult to remove the stitches if they were placed.  Band aid is optional after first 24 hours.      If you notice increasing redness, tenderness, pain, or yellow drainage at the biopsy or surgical site, please notify your doctor.  These are signs of an infection.    If your biopsy/surgical site is bleeding, apply firm pressure for 15 minutes straight.  Repeat for another 15 minutes, if it is still bleeding.   If the surgical site continues to bleed, then please contact your doctor.      For [facility] users:   You will receive a [facility] notification after the pathologist has finished reviewing your biopsy specimen. Pathology results, however, will not be released online so you will see a "no content" message. Once your dermatologist reviews and clinically correlates your biopsy results, you will either receive a letter in " the mail with the results of a phone call from your doctor's office if further explanation or treatment is warranted.       [address] [place] [address]/ [phone] FAX/ [email or web address]

## 2020-08-25 ENCOUNTER — PATIENT OUTREACH (OUTPATIENT)
Dept: ADMINISTRATIVE | Facility: HOSPITAL | Age: 50
End: 2020-08-25

## 2020-08-27 LAB
FINAL PATHOLOGIC DIAGNOSIS: NORMAL
GROSS: NORMAL
MICROSCOPIC EXAM: NORMAL

## 2020-08-28 NOTE — TELEPHONE ENCOUNTER
Called the pt and informed her of the test results.   The pt expressed understanding.   I scheduled the pt for the next available follow up appointment with Dr. Garber on Thursday Oct 1, 2020 at [facility] for 2:40 pm.   Mailed the pt an appointment reminder.     ND

## 2020-09-01 DIAGNOSIS — S29.011A MUSCLE STRAIN OF CHEST WALL, INITIAL ENCOUNTER: Primary | ICD-10-CM

## 2020-09-04 ENCOUNTER — PATIENT MESSAGE (OUTPATIENT)
Dept: DERMATOLOGY | Facility: CLINIC | Age: 50
End: 2020-09-04

## 2020-09-04 DIAGNOSIS — L57.0 AK (ACTINIC KERATOSIS): Primary | ICD-10-CM

## 2020-09-21 ENCOUNTER — CLINICAL SUPPORT (OUTPATIENT)
Dept: REHABILITATION | Facility: HOSPITAL | Age: 50
End: 2020-09-21
Attending: INTERNAL MEDICINE
Payer: COMMERCIAL

## 2020-09-21 DIAGNOSIS — S29.011A MUSCLE STRAIN OF CHEST WALL, INITIAL ENCOUNTER: ICD-10-CM

## 2020-09-21 PROCEDURE — 97161 PT EVAL LOW COMPLEX 20 MIN: CPT | Mod: PN

## 2020-09-21 NOTE — PLAN OF CARE
[facility]  Physical Therapy Initial Evaluation    Name: Nisha Mie  Clinic Number: [id number]    Therapy Diagnosis:   Encounter Diagnosis   Name Primary?    Muscle strain of chest wall, initial encounter      Physician: Alisa Thomas MD    Physician Orders: PT Eval and Treat  Medical Diagnosis from Referral: S29.011A (ICD-10-CM) - Muscle strain of chest wall, initial encounter  Evaluation Date: 9/21/2020  Authorization Period Expiration: 12/31/2020  Plan of Care Expiration: 11/20/2020  Visit # / Visits authorized: 1/60  FOTO: 1/5  PTA Visit: 0/6    Cap Visit: 113.10  Cap Total: 113.10    Time In: 3:00 PM  Time Out: 3:30 PM  Total Billable Time: 30 minutes (1 LCE)    Precautions: Standard    Subjective   Date of onset: 7/2020  History of current condition - Nisha reports: she had COVID-19 in July with only symptoms of loss of taste and smell only (no cough). About a month later she started getting some tenderness at the middle left of her upper chest and lateral left breast area. Her two spots of pain at her left chest area do not always happen equally or at the same time,a nd she also presents with L shoulder pain that she states spreads from her chest at times. She also reports only L 3-5th finger tingling though no numbness or pain. Pt denies any trauma or severe coughing episodes when having COVID-19, and only has some trouble breathing now that is slowly getting better. She has been worked up by her cardiopulmonary MD, and has been referred to an orthopedic that referred her to PT. She works as a nurse carrying trays, pushing hospital beds, and lifting/carrying heavy items. She describes her left upper arm pain as burning with some 3-5th finger tingling in her left hand that goes along with her left arm pain at times. Her pain worsens with activity, and thinks her pain is less intense and frequent due to avoiding over working her left arm.      Medical History:    Past Medical History:   Diagnosis Date    Allergy     History of ovarian cyst        Surgical History:   Nisha Mei  has a past surgical history that includes Dilation and curettage of uterus and Tubal ligation (Bilateral, 11/19/2008).    Medications:   Nisha has a current medication list which includes the following prescription(s): alprazolam, aspirin, picato, and triamcinolone acetonide 0.1%.    Allergies:   Review of patient's allergies indicates:  No Known Allergies     Imaging: See EMR for imaging    Prior Therapy: none  Social History: [facility]  Occupation: works as LPN (nurse)  Prior Level of Function: no chest or left arm pain  Current Level of Function Limitations: limited in L arm activity to to pain during or after increased activity.  Pts goals: to stop my left arm and chest pain    Pain:  Current 3/10, worst 8/10, best 3/10   Location: medial left side of upper chest and L lateral pec area  Description: aching and burning  Aggravating Factors: see current functional limitations  Easing Factors: rest    Objective     Sensation: pt reported light touch changes in the form of tingling in left 3-5th fingers  Palpation: +TTP at L pec minor, L distal aspect of coracoid process, L 2-3rd rib anteriorly and posteriorly   Flexibility: slight L pec major tightness  Posture: slight forward head  Resting scapular position: WNL  Handedness: R    A/PROM and MMT:    * = L chest wall pain with testing  NT = Not tested  AROM: CERVICAL   Flexion 100%   Extension 100%   Right side bending 100%   Left side bending 100%   Right rotation 100%   Left rotation 100%     Shoulder  Right   Left  Pain/Dysfunction with Movement    AROM PROM MMT AROM PROM MMT    Upper trap (C4) WFL WFL 5/5 WFL WFL 5/5    Biceps (C5,6) WFL WFL 5/5 WFL WFL 5/5    Wrist ext (C6) WFL WFL NT WFL WFL NT    Triceps (C7) WFL WFL 5/5 WFL WFL 5/5    Wrist flexion (C7) WFL WFL NT WFL WFL NT     strength and thumb ext (C8) WFL WFL 5/5 WFL WFL  5/5    Finger abd/add (T1) WFL WFL 5/5 WFL WFL 5/5    Serratus anterior WFL WFL 5/5 WFL WFL 4/5    flexion WFL WFL 5/5 WFL WFL 4+/5    extension WFL WFL 5/5 WFL WFL 5/5    Abduction (C5) WFL WFL 5/5 WFL WFL 4+/5    adduction WFL WFL 5/5 WFL WFL 5/5    Internal rotation WFL WFL 5/5 WFL WFL 5/5* L lateral chest pain with L testing   ER at 90° abd WFL WFL NT WFL WFL NT    ER at 0° abd WFL WFL 4/5 WFL WFL 4/5    Middle trap WFL WFL NT WFL WFL NT    Lower trap WFL WFL NT WFL WFL NT      Shoulder testing:  Destiney: negative L  Speed's Test: negative L  Empty can test: negative L  [place]'s test: negative L  Subscapularis lift off test: negative L  Pec minor length test: L shoulder posteriorly more than R  Pec major length test: slight tightness on L, positive for L lateral chest concordant pain    Ribs:  Inhalation: mild center concordant chest pain at full breathe in  Exhalation: no chest pain, wheezing present (history of COVID-19 this past summer)  1st rib: positive for increased sensitivity for L compared to right, will re-check for elevation difference    Special Tests:  Spurling's test = negative  Quadrant Testing = negative  Cervical Distraction Test = negative  Deep Neck Flexor Endurance Test (2.5 cm off mat, norm>38'') = not tested  ULTT: negative for ulnar, medium and radial concordant pain and tingling. Pt reported L 4-5th finger numbness with testing instead  TOS: Adson's (NT), Costoclavicular (NT), Pectoralis minor (NT), Anterior GH capsule (NT), Atul (NT)    Cervical radiculopathy cluster: (3/4 = 65% probability, 4/4 90% probability)  - (+) ULTT, (+) Spurlling's Test, (+) Distraction Test, Cervical rot < 60 degrees  - 0/4 for cluster    CMS Impairment/Limitation/Restriction for FOTO NECK Survey    Therapist reviewed FOTO scores for Nisha Mei on 9/21/2020.   FOTO documents entered into [employer] - see Media section.    Limitation Score: 20%  Category: Mobility  Predicted: 23%     TREATMENT   No  treatment today.    Home Exercises and Patient Education Provided  Education provided:   - proper body mechanics with lifting/carrying  - course of therapy, prognosis    Written Home Exercises Provided: not today.  Exercises were reviewed and Nisha was able to demonstrate them prior to the end of the session.  Nisha demonstrated good  understanding of the education provided.     See EMR under Media for exercises provided not today.    Assessment   Nisha is a 49 y.o. female referred to outpatient Physical Therapy with a medical diagnosis of Muscle strain of chest wall, initial encounter.  Pt currently presents with left sided medial chest and lateral chest pain, intermittent L arm pain with intermittent L hand 3-5th finger tingling, decreased LUE strength, impaired posture, and functional deficits with heavy lifting and carrying activities. Pt has been screened for cardiovascular involvement by MD, and pt signs and symptoms will be closely monitored in therapy. Current PT differential diagnosis includes intercostal muscle strain, pec major/minor strain, elevated 1st rib, and 2-3rd rib subluxation.Pt condition appears to have plateaued with improvement since onset this past July with improvement, and is here to trial physical therapy. Pt would benefit from skilled PT consisting of muscular skeletal stretching/strengthening, manual therapy, neuro muscular re-education, and modalities prn to address limitations and increase functional mobility.    Pt prognosis is Good.   Pt will benefit from skilled outpatient Physical Therapy to address the deficits stated above and in the chart below, provide pt/family education, and to maximize pt's level of independence.     Plan of care discussed with patient: Yes  Pt's spiritual, cultural and educational needs considered and patient is agreeable to the plan of care and goals as stated below:     Anticipated Barriers for therapy: chronicity of pain    Medical Necessity is  demonstrated by the following  History  Co-morbidities and personal factors that may impact the plan of care Co-morbidities:   none    Personal Factors:   no deficits     low   Examination  Body Structures and Functions, activity limitations and participation restrictions that may impact the plan of care Body Regions:   head  neck  upper extremities  trunk    Body Systems:    gross symmetry  ROM  strength  gross coordinated movement  balance  gait  transfers  transitions  motor control    Participation Restrictions:   none    Activity limitations:   Learning and applying knowledge  no deficits    General Tasks and Commands  no deficits    Communication  no deficits    Mobility  lifting and carrying objects    Self care  no deficits    Domestic Life  doing house work (cleaning house, washing dishes, laundry)    Interactions/Relationships  no deficits    Life Areas  no deficits    Community and Social Life  no deficits         high   Clinical Presentation stable and uncomplicated low   Decision Making/ Complexity Score: low     GOALS: Short Term Goals: 4 weeks  1. Pt will demo good deep neck flexor strength to improve cervical lordosis and stabilization.  2. Increase cervical ROM by 5-10 degrees in order to perform ADLs with decreased difficulty.  3. Pt will demo good sitting and standing posture for improved spine health and decreased neck pain.  4. Pt to tolerate HEP to improve ROM and independence with ADL's.    Long Term Goals: 8 weeks  1. Report decreased L arm and chest pain </=1/10 to increase tolerance for ADLs and work activities.  2. Increase cervical AROM to WFL with min neck pain for increased functional mobility.  3. Increased strength in B shoulders/scapular stabilizers to >/= 4/5 MMT grade to increase tolerance for ADL and work activities.  4. Pt will report at </= 23% impaired on FOTO neck score for neck pain disability to demonstrate decrease in disability and improvement in neck pain.    Plan   Plan  of care Certification: 9/21/2020 to 11/20/2020.    Outpatient Physical Therapy 2 times weekly for 8 weeks to include the following interventions: Aquatic Therapy, Cervical/Lumbar Traction, Electrical Stimulation, Gait Training, Manual Therapy, Moist Heat/ Ice, Neuromuscular Re-ed, Orthotic Management and Training, Patient Education, Self Care, Therapeutic Activites and Therapeutic Exercise.     Wallace Pinzon, PT

## 2020-09-22 PROBLEM — S29.011A CHEST WALL MUSCLE STRAIN: Status: ACTIVE | Noted: 2020-09-22

## 2020-09-29 ENCOUNTER — PATIENT OUTREACH (OUTPATIENT)
Dept: ADMINISTRATIVE | Facility: OTHER | Age: 50
End: 2020-09-29

## 2020-09-29 DIAGNOSIS — Z12.31 ENCOUNTER FOR SCREENING MAMMOGRAM FOR MALIGNANT NEOPLASM OF BREAST: Primary | ICD-10-CM

## 2020-09-29 NOTE — PROGRESS NOTES
Health Maintenance Due   Topic Date Due    Hepatitis C Screening  1970    Influenza Vaccine (1) 08/01/2020    Cervical Cancer Screening  07/31/2020    Mammogram  10/08/2020     Updates were requested from care everywhere.  Chart was reviewed for overdue Proactive [facility] Encounters (FR[facility]) topics (CRS, Breast Cancer Screening, Eye exam)  Health Maintenance has been updated.  LINKS immunization registry triggered.  Immunizations were reconciled.  Future order placed for mammogram.

## 2020-10-01 ENCOUNTER — OFFICE VISIT (OUTPATIENT)
Dept: CARDIOLOGY | Facility: CLINIC | Age: 50
End: 2020-10-01
Payer: COMMERCIAL

## 2020-10-01 VITALS
HEART RATE: 63 BPM | OXYGEN SATURATION: 99 % | BODY MASS INDEX: 32.26 KG/M2 | WEIGHT: 176.38 LBS | SYSTOLIC BLOOD PRESSURE: 115 MMHG | DIASTOLIC BLOOD PRESSURE: 80 MMHG

## 2020-10-01 DIAGNOSIS — E66.9 OBESITY, UNSPECIFIED CLASSIFICATION, UNSPECIFIED OBESITY TYPE, UNSPECIFIED WHETHER SERIOUS COMORBIDITY PRESENT: ICD-10-CM

## 2020-10-01 DIAGNOSIS — R07.9 CHEST PAIN, UNSPECIFIED TYPE: Primary | ICD-10-CM

## 2020-10-01 DIAGNOSIS — R06.09 DOE (DYSPNEA ON EXERTION): ICD-10-CM

## 2020-10-01 DIAGNOSIS — R00.2 PALPITATIONS: ICD-10-CM

## 2020-10-01 PROCEDURE — 99999 PR PBB SHADOW E&M-EST. PATIENT-LVL III: CPT | Mod: PBBFAC,,, | Performed by: INTERNAL MEDICINE

## 2020-10-01 PROCEDURE — 99999 PR PBB SHADOW E&M-EST. PATIENT-LVL III: ICD-10-PCS | Mod: PBBFAC,,, | Performed by: INTERNAL MEDICINE

## 2020-10-01 PROCEDURE — 99499 UNLISTED E&M SERVICE: CPT | Mod: S$GLB,,, | Performed by: INTERNAL MEDICINE

## 2020-10-01 PROCEDURE — 99499 NO LOS: ICD-10-PCS | Mod: S$GLB,,, | Performed by: INTERNAL MEDICINE

## 2020-10-02 ENCOUNTER — CLINICAL SUPPORT (OUTPATIENT)
Dept: REHABILITATION | Facility: HOSPITAL | Age: 50
End: 2020-10-02
Payer: COMMERCIAL

## 2020-10-02 DIAGNOSIS — S29.011A MUSCLE STRAIN OF CHEST WALL, INITIAL ENCOUNTER: ICD-10-CM

## 2020-10-02 PROCEDURE — 97140 MANUAL THERAPY 1/> REGIONS: CPT | Mod: PN

## 2020-10-02 PROCEDURE — 97110 THERAPEUTIC EXERCISES: CPT | Mod: PN

## 2020-10-02 NOTE — PROGRESS NOTES
"  Physical Therapy Daily Treatment Note     Name: Nisha Mei  Clinic Number: [id number]    Therapy Diagnosis:   Encounter Diagnosis   Name Primary?    Muscle strain of chest wall, initial encounter      Therapy Diagnosis:   Physician: Alisa Thomas MD    Visit Date: 10/2/2020  Physician Orders: PT Eval and Treat  Medical Diagnosis from Referral: S29.011A (ICD-10-CM) - Muscle strain of chest wall, initial encounter  Evaluation Date: 9/21/2020  Authorization Period Expiration: 12/31/2020  Plan of Care Expiration: 11/20/2020  Visit # / Visits authorized: 1/60  FOTO: 2/5  PTA Visit: n/a    Time In:1345  Time Out: 1440  Total Billable Time: 55 minutes    Precautions: Standard    Subjective     Pt reports: Burning in L elbow today. 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   She was compliant with home exercise program.  Response to previous treatment: No change  Functional change: no change    Pain: 3/10  Location: left torso      Objective     Nisha received therapeutic exercises to develop strength, endurance, ROM, flexibility and posture for 30 minutes including:  Supine punches 3x10 each  Supine horiz abd with YTB 3x10  Supine pec stretch on 1/2 foam roll 2'  Open book 2x10 each  UT stretch 3x30" each  UBE x10 min    Nisha received the following manual therapy techniques: Joint mobilizations and Soft tissue Mobilization were applied to the: pecs and costovertebral junction for 15 minutes, including:  -Gr II mobs PA costovertebral junction L T3-6   -Gr I mobs L SC joint  -Contract relax B pec minor  -MET for T/S rotation B in quadruped      Home Exercises Provided and Patient Education Provided     Education provided:   -Holding to assess tolerance to exercise interventions    Written Home Exercises Provided: hold. see above.  Exercises were reviewed and Nisha was able to demonstrate them prior to the end " of the session.  Nisha demonstrated n/a understanding of the education provided.     See EMR under n/a for exercises provided n/a.    Assessment   Pt tolerated treatment well.   Nisha is not progressing well towards her goals.   Pt prognosis is Good.     Pt will continue to benefit from skilled outpatient physical therapy to address the deficits listed in the problem list box on initial evaluation, provide pt/family education and to maximize pt's level of independence in the home and community environment.     Pt's spiritual, cultural and educational needs considered and pt agreeable to plan of care and goals.    Anticipated barriers to physical therapy: none    Goals: GOALS: Short Term Goals: 4 weeks  1. Pt will demo good deep neck flexor strength to improve cervical lordosis and stabilization. Progressing, not met   2. Increase cervical ROM by 5-10 degrees in order to perform ADLs with decreased difficulty.Progressing, not met   3. Pt will demo good sitting and standing posture for improved spine health and decreased neck pain. Progressing, not met   4. Pt to tolerate HEP to improve ROM and independence with ADL's. Progressing, not met      Long Term Goals: 8 weeks  1. Report decreased L arm and chest pain </=1/10 to increase tolerance for ADLs and work activities. Progressing, not met   2. Increase cervical AROM to WFL with min neck pain for increased functional mobility.Progressing, not met   3. Increased strength in B shoulders/scapular stabilizers to >/= 4/5 MMT grade to increase tolerance for ADL and work activities. Progressing, not met   4. Pt will report at </= 23% impaired on FOTO neck score for neck pain disability to demonstrate decrease in disability and improvement in neck pain. Progressing, not met     Plan     Assess tolerance to exercise interventions and formalize HEP    London Limon, PT

## 2020-10-05 ENCOUNTER — CLINICAL SUPPORT (OUTPATIENT)
Dept: REHABILITATION | Facility: HOSPITAL | Age: 50
End: 2020-10-05
Payer: COMMERCIAL

## 2020-10-05 ENCOUNTER — PATIENT MESSAGE (OUTPATIENT)
Dept: ADMINISTRATIVE | Facility: HOSPITAL | Age: 50
End: 2020-10-05

## 2020-10-05 DIAGNOSIS — R07.9 CHEST PAIN, UNSPECIFIED TYPE: ICD-10-CM

## 2020-10-05 DIAGNOSIS — S29.011A MUSCLE STRAIN OF CHEST WALL, INITIAL ENCOUNTER: ICD-10-CM

## 2020-10-05 PROCEDURE — 97110 THERAPEUTIC EXERCISES: CPT | Mod: PN

## 2020-10-05 PROCEDURE — 97140 MANUAL THERAPY 1/> REGIONS: CPT | Mod: PN

## 2020-10-05 NOTE — PROGRESS NOTES
"  Physical Therapy Daily Treatment Note     Name: Nisha Mei  Clinic Number: [id number]    Therapy Diagnosis:   Encounter Diagnoses   Name Primary?    Muscle strain of chest wall, initial encounter     Chest pain, unspecified type      Therapy Diagnosis:   Physician: Alisa Thomas MD    Visit Date: 10/5/2020  Physician Orders: PT Eval and Treat  Medical Diagnosis from Referral: S29.011A (ICD-10-CM) - Muscle strain of chest wall, initial encounter  Evaluation Date: 9/21/2020  Authorization Period Expiration: 12/31/2020  Plan of Care Expiration: 11/20/2020  Visit # / Visits authorized: 3/60  FOTO: 3/5  PTA Visit: n/a    Time In:1415  Time Out: 1505  Total Billable Time: 50 minutes    Precautions: Standard    Subjective     Pt reports: less burning in her arm since last Friday. Her pain comes and goes but she hasn't had to work this weekend so it isn't as bad  She was compliant with home exercise program.  Response to previous treatment: decreased burning  Functional change: no change    Pain: 3/10  Location: left torso      Objective     + ulnar nerve neural tension L    Nisha received therapeutic exercises to develop strength, endurance, ROM, flexibility and posture for 40 minutes including:    Rows GTB 3x10  Shoulder extension GTB 2x10  Corner st 2x30"  Supine punches 3x10 each  Supine horiz abd with YTB 3x10  Supine pec stretch on 1/2 foam roll 2'  Open book x15 each  UT stretch 3x30" each  UBE x5 min  L ulnar nerve glides x20    Nisha received the following manual therapy techniques: Joint mobilizations and Soft tissue Mobilization were applied to the: pecs and costovertebral junction for 10 minutes, including:    -Gr II mobs PA costovertebral junction L T3-6   -Gr I mobs L SC joint NP  -Contract relax L pec minor  -MET for T/S rotation B in quadruped NP      Home Exercises Provided and Patient Education Provided     Education provided:   -Holding to assess tolerance to exercise " interventions    Written Home Exercises Provided: hold. see above.  Exercises were reviewed and Nisha was able to demonstrate them prior to the end of the session.  Nisha demonstrated n/a understanding of the education provided.     See EMR under n/a for exercises provided n/a.    Assessment   Pt tolerated treatment well. Positive neural tension ulnar nerve L side so pt provided with nerve glides for program and HEP. Pt with significant TTP L coracoid process. Increased TTP L costovertebral joints L T1-T6, however, so reproduction of symptoms with mobilizations.   Nisha is not progressing well towards her goals.   Pt prognosis is Good.     Pt will continue to benefit from skilled outpatient physical therapy to address the deficits listed in the problem list box on initial evaluation, provide pt/family education and to maximize pt's level of independence in the home and community environment.     Pt's spiritual, cultural and educational needs considered and pt agreeable to plan of care and goals.    Anticipated barriers to physical therapy: none    Goals: GOALS: Short Term Goals: 4 weeks  1. Pt will demo good deep neck flexor strength to improve cervical lordosis and stabilization. Progressing, not met   2. Increase cervical ROM by 5-10 degrees in order to perform ADLs with decreased difficulty.Progressing, not met   3. Pt will demo good sitting and standing posture for improved spine health and decreased neck pain. Progressing, not met   4. Pt to tolerate HEP to improve ROM and independence with ADL's. Progressing, not met      Long Term Goals: 8 weeks  1. Report decreased L arm and chest pain </=1/10 to increase tolerance for ADLs and work activities. Progressing, not met   2. Increase cervical AROM to WFL with min neck pain for increased functional mobility.Progressing, not met   3. Increased strength in B shoulders/scapular stabilizers to >/= 4/5 MMT grade to increase tolerance for ADL and work  activities. Progressing, not met   4. Pt will report at </= 23% impaired on FOTO neck score for neck pain disability to demonstrate decrease in disability and improvement in neck pain. Progressing, not met     Plan     Assess tolerance to exercise interventions and formalize HEP    Isabela Ahuja, PT

## 2020-10-09 ENCOUNTER — CLINICAL SUPPORT (OUTPATIENT)
Dept: REHABILITATION | Facility: HOSPITAL | Age: 50
End: 2020-10-09
Payer: COMMERCIAL

## 2020-10-09 DIAGNOSIS — R07.9 CHEST PAIN, UNSPECIFIED TYPE: ICD-10-CM

## 2020-10-09 DIAGNOSIS — S29.011A MUSCLE STRAIN OF CHEST WALL, INITIAL ENCOUNTER: ICD-10-CM

## 2020-10-09 PROCEDURE — 97140 MANUAL THERAPY 1/> REGIONS: CPT | Mod: PN

## 2020-10-09 PROCEDURE — 97110 THERAPEUTIC EXERCISES: CPT | Mod: PN

## 2020-10-09 NOTE — PROGRESS NOTES
"  Physical Therapy Daily Treatment Note     Name: Nisha Mei  Clinic Number: [id number]    Therapy Diagnosis:   Encounter Diagnoses   Name Primary?    Muscle strain of chest wall, initial encounter     Chest pain, unspecified type      Therapy Diagnosis:   Physician: Alisa Thomas MD    Visit Date: 10/9/2020  Physician Orders: PT Eval and Treat  Medical Diagnosis from Referral: S29.011A (ICD-10-CM) - Muscle strain of chest wall, initial encounter  Evaluation Date: 9/21/2020  Authorization Period Expiration: 12/31/2020  Plan of Care Expiration: 11/20/2020  Visit # / Visits authorized: 4/60  FOTO: 4/5  PTA Visit: n/a    Time In:1430  Time Out: 1515  Total Billable Time: 45 minutes    Precautions: Standard    Subjective     Pt reports: Doing better. Is trying to pay attention more which activities are bothering her.   She was compliant with home exercise program.  Response to previous treatment: decreased burning  Functional change: no change    Pain: 3/10  Location: left torso      Objective     + ulnar nerve neural tension L    Nisha received therapeutic exercises to develop strength, endurance, ROM, flexibility and posture for 30 minutes including:    Rows GTB 3x10  Shoulder extension GTB 2x10  Supine punches 3x10 each  Supine horiz abd with YTB 3x10  Supine pec stretch on 1/2 foam roll 2'x2 (abducted and shoulder flex)  Open book x15 each  UT stretch 3x30" each    Not Today  Corner st 2x30"  UBE x5 min  L ulnar nerve glides x20    Nisha received the following manual therapy techniques: Joint mobilizations and Soft tissue Mobilization were applied to the: pecs and costovertebral junction for 15 minutes, including:    -Gr II mobs PA costovertebral junction L T3-6   -Gr I mobs L SC joint NP  -Contract relax L pec minor  -MET for T/S rotation B in quadruped NP  -Scapular Release  -Subscap Release      Home Exercises Provided and Patient Education Provided     Education provided:   -Holding to " assess tolerance to exercise interventions    Written Home Exercises Provided: hold. see above.  Exercises were reviewed and Nisha was able to demonstrate them prior to the end of the session.  Nisha demonstrated n/a understanding of the education provided.     See EMR under n/a for exercises provided n/a.    Assessment   Pt was able to tolerate all exercises during today's session well without c/o increased pain but mild-mod discomfort/tenderness on medial border of L scapula and costovertebral joints of T3-T8. PT performed scapular release and sub scapular release. Pt was guarded and PT was unable to fully perform full release of scapular release. Pt reports she feels very tender under her scapula. PT and pt discussed body mechanics of driving and when at work. Pt verbalized understanding of trying not to apply all her pressure on the L UE and relax her L shoulder and not have her scapula elevated constantly when driving. Pt will report back any other activities that are bothering her, but has found relief with pec stretches.     Nisha is not progressing well towards her goals.   Pt prognosis is Good.     Pt will continue to benefit from skilled outpatient physical therapy to address the deficits listed in the problem list box on initial evaluation, provide pt/family education and to maximize pt's level of independence in the home and community environment.     Pt's spiritual, cultural and educational needs considered and pt agreeable to plan of care and goals.    Anticipated barriers to physical therapy: none    Goals: GOALS: Short Term Goals: 4 weeks  1. Pt will demo good deep neck flexor strength to improve cervical lordosis and stabilization. Progressing, not met   2. Increase cervical ROM by 5-10 degrees in order to perform ADLs with decreased difficulty.Progressing, not met   3. Pt will demo good sitting and standing posture for improved spine health and decreased neck pain. Progressing, not met    4. Pt to tolerate HEP to improve ROM and independence with ADL's. Progressing, not met      Long Term Goals: 8 weeks  1. Report decreased L arm and chest pain </=1/10 to increase tolerance for ADLs and work activities. Progressing, not met   2. Increase cervical AROM to WFL with min neck pain for increased functional mobility.Progressing, not met   3. Increased strength in B shoulders/scapular stabilizers to >/= 4/5 MMT grade to increase tolerance for ADL and work activities. Progressing, not met   4. Pt will report at </= 23% impaired on FOTO neck score for neck pain disability to demonstrate decrease in disability and improvement in neck pain. Progressing, not met     Plan     Assess tolerance to exercise interventions and formalize HEP    Dimas Mckenzie, PT

## 2020-10-12 ENCOUNTER — CLINICAL SUPPORT (OUTPATIENT)
Dept: REHABILITATION | Facility: HOSPITAL | Age: 50
End: 2020-10-12
Payer: COMMERCIAL

## 2020-10-12 DIAGNOSIS — R07.9 CHEST PAIN, UNSPECIFIED TYPE: ICD-10-CM

## 2020-10-12 DIAGNOSIS — S29.011A MUSCLE STRAIN OF CHEST WALL, INITIAL ENCOUNTER: ICD-10-CM

## 2020-10-12 PROCEDURE — 97140 MANUAL THERAPY 1/> REGIONS: CPT | Mod: PN,CQ

## 2020-10-12 PROCEDURE — 97110 THERAPEUTIC EXERCISES: CPT | Mod: PN,CQ

## 2020-10-12 NOTE — PATIENT INSTRUCTIONS
"  Strengthening: Resisted Row    Face anchor at waist height, medium to wide stance. Thumbs up, pull arms back, squeezing shoulder blades together. Do not shrug up. Slowly return to start.  Repeat 10 times each side per set. Do 1 sets per session. Do 2 sessions per day.      Strengthening: Resisted Extension    Hold Blue elastic band in both hand, elbow straight and arm in front of body. Pull arm back, elbow straight, and squeeze shoulder blades back. Do not shrug.  Repeat 10 times each side per set. Do 1 sets per session. Do 2 sessions per day.      SHOULDER: Stabilization, Lower Traps    Lie on your stomach with one arm off the edge. Raise the arm up like a "Y" with your thumb up, keeping elbows straight.   Repeat 10 times left side per set. Do 1 sets per session. Do 2 sessions per day.      Scapular Retraction: Abduction / Extension (Prone)    Lie on your stomach with one arm off the edge. Raise the arm straight out to side like a "T" with your thumb up, keeping elbows straight.   Repeat 10 times left side per set. Do 1 sets per session. Do 2 sessions per day.      Scapular: Protraction - 90° of Flexion    Push arms up toward ceiling, keeping elbows straight and back against floor.  Repeat 10 times each side per set. Do 1 sets per session. Do 2 sessions per day.      "

## 2020-10-12 NOTE — PROGRESS NOTES
"  Physical Therapy Daily Treatment Note     Name: Nisha Mei  Clinic Number: [id number]    Therapy Diagnosis:   Encounter Diagnoses   Name Primary?    Muscle strain of chest wall, initial encounter     Chest pain, unspecified type      Therapy Diagnosis:   Physician: Alisa Thomas MD    Visit Date: 10/12/2020  Physician Orders: PT Eval and Treat  Medical Diagnosis from Referral: S29.011A (ICD-10-CM) - Muscle strain of chest wall, initial encounter  Evaluation Date: 9/21/2020  Authorization Period Expiration: 12/31/2020  Plan of Care Expiration: 11/20/2020  Visit # / Visits authorized: 5/60  FOTO: 5/5 NEXT VISIT  PTA Visit: 1/6    Time In: 2:25 pm  Time Out: 3:15 pm  Total Billable Time: 50 minutes TE- 2, MT-1    Precautions: Standard    Subjective     Pt reports: her pain is about the same and still in her left shoulder blade area and left chest laterally.  She was compliant with home exercise program.  Response to previous treatment: decreased burning  Functional change: no change    Pain: 3/10  Location: left torso      Objective     Nisha received therapeutic exercises to develop strength, endurance, ROM, flexibility and posture for 30 minutes including:    Rows BTB 2x10  Shoulder extension BTB 2x10  Supine punches 3x10 each  Supine horiz abd with YTB 3x10  Supine pec stretch on 1/2 foam roll 2'x2 (abducted and shoulder flex)  Open book 2x10 each  Prone shoulder Y: x10 L  Prone shoulder T: x10 L  UT stretch 3x30" each  Corner st 3x30"    Not Today  UBE x5 min  L ulnar nerve glides x20    Nisha received the following manual therapy techniques: Joint mobilizations and Soft tissue Mobilization were applied to the: pecs and costovertebral junction for 20 minutes, including:    -Gr II mobs PA costovertebral junction L T3-6 - NOT TODAY  -Gr I mobs L SC joint NP - NOT TODAY  -Contract relax L pec minor  -MET for T/S rotation B in quadruped NP - NOT TODAY  -Scapular Release  -Subscap Release    Home " Exercises Provided and Patient Education Provided     Education provided:   -keep exercises in a pain free range.    Written Home Exercises Provided: yes.  Exercises were reviewed and Nisha was able to demonstrate them prior to the end of the session.  Nisha demonstrated n/a understanding of the education provided.     See EMR under n/a for exercises provided 10/12/2020.    Assessment   Patient had no difficulty with new exercises added today along with today's progressions with no increase in symptoms prior to leaving the clinic.  Patient continues with mild-mod discomfort/tenderness on medial border of L scapula and costovertebral joints of T3-T8.  Patient received scapular release and sub scapular release. Pt was guarded and PTA was unable to achieve full release of scapular. Pt reports she feels very tender under her scapula.     Nisha is not progressing well towards her goals.   Pt prognosis is Good.     Pt will continue to benefit from skilled outpatient physical therapy to address the deficits listed in the problem list box on initial evaluation, provide pt/family education and to maximize pt's level of independence in the home and community environment.     Pt's spiritual, cultural and educational needs considered and pt agreeable to plan of care and goals.    Anticipated barriers to physical therapy: none    Goals: GOALS: Short Term Goals: 4 weeks  1. Pt will demo good deep neck flexor strength to improve cervical lordosis and stabilization. Progressing, not met   2. Increase cervical ROM by 5-10 degrees in order to perform ADLs with decreased difficulty.Progressing, not met   3. Pt will demo good sitting and standing posture for improved spine health and decreased neck pain. Progressing, not met   4. Pt to tolerate HEP to improve ROM and independence with ADL's. Progressing, not met      Long Term Goals: 8 weeks  1. Report decreased L arm and chest pain </=1/10 to increase tolerance for ADLs and  work activities. Progressing, not met   2. Increase cervical AROM to WFL with min neck pain for increased functional mobility.Progressing, not met   3. Increased strength in B shoulders/scapular stabilizers to >/= 4/5 MMT grade to increase tolerance for ADL and work activities. Progressing, not met   4. Pt will report at </= 23% impaired on FOTO neck score for neck pain disability to demonstrate decrease in disability and improvement in neck pain. Progressing, not met     Plan     Continue with Plan Of Care and progress toward PT goals.    King Contreras, PTA

## 2020-10-14 ENCOUNTER — CLINICAL SUPPORT (OUTPATIENT)
Dept: REHABILITATION | Facility: HOSPITAL | Age: 50
End: 2020-10-14
Payer: COMMERCIAL

## 2020-10-14 ENCOUNTER — PATIENT MESSAGE (OUTPATIENT)
Dept: DERMATOLOGY | Facility: CLINIC | Age: 50
End: 2020-10-14

## 2020-10-14 DIAGNOSIS — R07.9 CHEST PAIN, UNSPECIFIED TYPE: ICD-10-CM

## 2020-10-14 DIAGNOSIS — S29.011A MUSCLE STRAIN OF CHEST WALL, INITIAL ENCOUNTER: ICD-10-CM

## 2020-10-14 PROCEDURE — 97140 MANUAL THERAPY 1/> REGIONS: CPT | Mod: PN

## 2020-10-14 PROCEDURE — 97110 THERAPEUTIC EXERCISES: CPT | Mod: PN

## 2020-10-14 NOTE — PROGRESS NOTES
"  Physical Therapy Daily Treatment Note     Name: Nisha Mei  Clinic Number: [id number]    Therapy Diagnosis:   Encounter Diagnoses   Name Primary?    Muscle strain of chest wall, initial encounter     Chest pain, unspecified type      Therapy Diagnosis:   Physician: Alisa Thomas MD    Visit Date: 10/14/2020  Physician Orders: PT Eval and Treat  Medical Diagnosis from Referral: S29.011A (ICD-10-CM) - Muscle strain of chest wall, initial encounter  Evaluation Date: 9/21/2020  Authorization Period Expiration: 12/31/2020  Plan of Care Expiration: 11/20/2020  Visit # / Visits authorized: 6/60  FOTO: 6/5 NEXT VISIT  PTA Visit: 0/6    Time In: 1415  Time Out: 1500  Total Billable Time: 45 minutes TE- 2, MT-1    Precautions: Standard    Subjective     Pt reports: Felt tingling in her arms the other morning when waking up as she slept on her stomach and normally sleeps on her side.   She was compliant with home exercise program.  Response to previous treatment: decreased burning  Functional change: no change    Pain: 1/10  Location: left torso      Objective     Nisha received therapeutic exercises to develop strength, endurance, ROM, flexibility and posture for 25 minutes including:    UBE x5 min  Rows BTB 2x10  Shoulder extension BTB 2x10  Supine punches 3x10 each  Supine horiz abd with YTB 3x10  Supine pec stretch on 1/2 foam roll 2'x2 (abducted and shoulder flex)  Open book 2x10 each  Prone shoulder Y: x10 L  Prone shoulder T: x10 L  UT stretch 3x30" each  Corner st 3x30"    Not Today    L ulnar nerve glides x20    Nisha received the following manual therapy techniques: Joint mobilizations and Soft tissue Mobilization were applied to the: pecs and costovertebral junction for 20 minutes, including:    -Gr II mobs PA costovertebral junction L T3-6 - NOT TODAY  -Gr I mobs L SC joint NP - NOT TODAY  -Contract relax L pec minor  -MET for T/S rotation B in quadruped NP - NOT TODAY  -Scapular " Release  -Subscap Release  -Cupping to Mid Traps on medial border of scapula, applied for 10 min while working out.     Home Exercises Provided and Patient Education Provided     Education provided:   -keep exercises in a pain free range.    Written Home Exercises Provided: yes.  Exercises were reviewed and Nisha was able to demonstrate them prior to the end of the session.  Nisha demonstrated n/a understanding of the education provided.     See EMR under n/a for exercises provided 10/12/2020.    Assessment   Patient was able to tolerate all exercises during today's treatment without any c/o increased pain. PT placed 2 cups on medial border of scapula around T2 and T6 to help provide increased blow flow and tissue healing for tenderness pt has been experiencing. Pt still experiencing tenderness to palpation around L pec major/minor Insertion and medial mid pec. Pt was able to tolerate 10 min of exercises while cupped.    Nisha is not progressing well towards her goals.   Pt prognosis is Good.     Pt will continue to benefit from skilled outpatient physical therapy to address the deficits listed in the problem list box on initial evaluation, provide pt/family education and to maximize pt's level of independence in the home and community environment.     Pt's spiritual, cultural and educational needs considered and pt agreeable to plan of care and goals.    Anticipated barriers to physical therapy: none    Goals: GOALS: Short Term Goals: 4 weeks  1. Pt will demo good deep neck flexor strength to improve cervical lordosis and stabilization. Progressing, not met   2. Increase cervical ROM by 5-10 degrees in order to perform ADLs with decreased difficulty.Progressing, not met   3. Pt will demo good sitting and standing posture for improved spine health and decreased neck pain. Progressing, not met   4. Pt to tolerate HEP to improve ROM and independence with ADL's. Progressing, not met      Long Term Goals: 8  weeks  1. Report decreased L arm and chest pain </=1/10 to increase tolerance for ADLs and work activities. Progressing, not met   2. Increase cervical AROM to WFL with min neck pain for increased functional mobility.Progressing, not met   3. Increased strength in B shoulders/scapular stabilizers to >/= 4/5 MMT grade to increase tolerance for ADL and work activities. Progressing, not met   4. Pt will report at </= 23% impaired on FOTO neck score for neck pain disability to demonstrate decrease in disability and improvement in neck pain. Progressing, not met     Plan     Continue with Plan Of Care and progress toward PT goals.    Dimas Mckenzie, PT

## 2020-10-15 ENCOUNTER — HOSPITAL ENCOUNTER (OUTPATIENT)
Dept: RADIOLOGY | Facility: HOSPITAL | Age: 50
Discharge: HOME | End: 2020-10-15
Attending: INTERNAL MEDICINE
Payer: COMMERCIAL

## 2020-10-15 DIAGNOSIS — Z12.31 ENCOUNTER FOR SCREENING MAMMOGRAM FOR MALIGNANT NEOPLASM OF BREAST: ICD-10-CM

## 2020-10-15 PROCEDURE — 77063 BREAST TOMOSYNTHESIS BI: CPT | Mod: 26,,, | Performed by: RADIOLOGY

## 2020-10-15 PROCEDURE — 77067 MAMMO DIGITAL SCREENING BILAT WITH TOMO: ICD-10-PCS | Mod: 26,,, | Performed by: RADIOLOGY

## 2020-10-15 PROCEDURE — 77067 SCR MAMMO BI INCL CAD: CPT | Mod: 26,,, | Performed by: RADIOLOGY

## 2020-10-15 PROCEDURE — 77067 SCR MAMMO BI INCL CAD: CPT | Mod: TC

## 2020-10-15 PROCEDURE — 77063 MAMMO DIGITAL SCREENING BILAT WITH TOMO: ICD-10-PCS | Mod: 26,,, | Performed by: RADIOLOGY

## 2020-10-19 ENCOUNTER — CLINICAL SUPPORT (OUTPATIENT)
Dept: REHABILITATION | Facility: HOSPITAL | Age: 50
End: 2020-10-19
Payer: COMMERCIAL

## 2020-10-19 DIAGNOSIS — R07.9 CHEST PAIN, UNSPECIFIED TYPE: ICD-10-CM

## 2020-10-19 DIAGNOSIS — S29.011A MUSCLE STRAIN OF CHEST WALL, INITIAL ENCOUNTER: ICD-10-CM

## 2020-10-19 PROCEDURE — 97140 MANUAL THERAPY 1/> REGIONS: CPT | Mod: PN

## 2020-10-19 PROCEDURE — 97110 THERAPEUTIC EXERCISES: CPT | Mod: PN

## 2020-10-19 NOTE — PROGRESS NOTES
"  Physical Therapy Daily Treatment Note     Name: Nisha Mei  Clinic Number: [id number]    Therapy Diagnosis:   Encounter Diagnoses   Name Primary?    Muscle strain of chest wall, initial encounter     Chest pain, unspecified type      Therapy Diagnosis:   Physician: Alisa Thomas MD    Visit Date: 10/19/2020  Physician Orders: PT Eval and Treat  Medical Diagnosis from Referral: S29.011A (ICD-10-CM) - Muscle strain of chest wall, initial encounter  Evaluation Date: 9/21/2020  Authorization Period Expiration: 12/31/2020  Plan of Care Expiration: 11/20/2020  Visit # / Visits authorized: 7/60  FOTO: 7/5 DONE  PTA Visit: 0/6    Time In: 1415  Time Out: 1500  Total Billable Time: 40 minutes TE- 3    Precautions: Standard    Subjective     Pt reports: Her upper back on the L-side feels good since last session but while on a brisk walk this morning felt discomfort and in her pec region.   She was compliant with home exercise program.  Response to previous treatment: decreased burning  Functional change: no change    Pain: 1/10   Location: left torso      Objective     Nisha received therapeutic exercises to develop strength, endurance, ROM, flexibility and posture for 40 minutes including:    UBE x6 min  Rows BTB 2x10  Shoulder extension BTB 2x10  Standing I,T,Y with 2# dumbbell 2x10    Following exercises performed with cupping on pec minor insertion and pec major medial border of origin for 20 min    UT stretch 3x30" each  Corner st 3x30"  Supine punches 3x10 each, 2# dumbbells   Supine horiz abd with YTB 3x10  Supine pec stretch on 1/2 foam roll 5' (abducted and shoulder flex)  Open book 2x10 each  Standing I, T, Y 10x (no weight)      Not Today  L ulnar nerve glides x20    Nisha received the following manual therapy techniques: Joint mobilizations and Soft tissue Mobilization were applied to the: pecs and costovertebral junction for 0 minutes, including: NOT TODAY    -Gr II mobs PA costovertebral " junction L T3-6 - NOT TODAY  -Gr I mobs L SC joint NP - NOT TODAY  -Contract relax L pec minor - NOT TODAY  -MET for T/S rotation B in quadruped NP - NOT TODAY  -Scapular Release   -Subscap Release  -Cupping to Mid Traps on medial border of scapula, applied for 10 min while working out.     Home Exercises Provided and Patient Education Provided     Education provided:   -keep exercises in a pain free range.    Written Home Exercises Provided: yes.  Exercises were reviewed and Nisha was able to demonstrate them prior to the end of the session.  Nisha demonstrated n/a understanding of the education provided.     See EMR under n/a for exercises provided 10/12/2020.    Assessment   Patient was able to tolerate all exercises during today's treatment without any c/o increased pain. PT placed 2 cups on pec minor insertion and medial sternal border of origin of pec major to help provide increased blow flow and tissue healing for tenderness pt has been experiencing. Pt was able to tolerate 20 min of exercises while cupped. Pt progressed with I, T, Y exercises for shoulder Flex, ABD, and Scaption and serratus punches with increased resistance. Pt felt increased stretch while cupped while performing shoulder ABD and Scaption (L side only).     Nisha is not progressing well towards her goals.   Pt prognosis is Good.     Pt will continue to benefit from skilled outpatient physical therapy to address the deficits listed in the problem list box on initial evaluation, provide pt/family education and to maximize pt's level of independence in the home and community environment.     Pt's spiritual, cultural and educational needs considered and pt agreeable to plan of care and goals.    Anticipated barriers to physical therapy: none    Goals: GOALS: Short Term Goals: 4 weeks  1. Pt will demo good deep neck flexor strength to improve cervical lordosis and stabilization. Progressing, not met   2. Increase cervical ROM by 5-10  degrees in order to perform ADLs with decreased difficulty.Progressing, not met   3. Pt will demo good sitting and standing posture for improved spine health and decreased neck pain. Progressing, not met   4. Pt to tolerate HEP to improve ROM and independence with ADL's. Progressing, not met      Long Term Goals: 8 weeks  1. Report decreased L arm and chest pain </=1/10 to increase tolerance for ADLs and work activities. Progressing, not met   2. Increase cervical AROM to WFL with min neck pain for increased functional mobility.Progressing, not met   3. Increased strength in B shoulders/scapular stabilizers to >/= 4/5 MMT grade to increase tolerance for ADL and work activities. Progressing, not met   4. Pt will report at </= 23% impaired on FOTO neck score for neck pain disability to demonstrate decrease in disability and improvement in neck pain. Progressing, not met     Plan     Continue with Plan Of Care and progress toward PT goals.    Dimas Mckenzie, PT

## 2020-10-19 NOTE — PROGRESS NOTES
Physical Therapy Dry Needling Note       Date:  10/19/2020    Name: Nisha Mei  Clinic Number: [id number]    Therapy Diagnosis:   Encounter Diagnoses   Name Primary?    Muscle strain of chest wall, initial encounter     Chest pain, unspecified type      Physician: Alisa Thomas MD    Start Time:  3:00  Stop Time:  3:15  Total Billable Time: 15 minutes    Subjective     See note by Dimas Mckenzie PT.       Patient verbalized consent to FDN: yes    Objective      Patient provided written and verbal consent to Functional Dry Needling   Nisha received the following manual therapy techniques: IMT to left subscapularis with stim     Home Exercises Provided and Patient Education Provided     Education provided:   - IMT info and what to expect     Assessment     Patient demonstrated appropriate response to Functional Dry Needling. Good rhythmical contractions observed with estim to treated muscle groups    Plan     Monitor response to Functional Dry Needling. Continue with Functional Dry Needling in POC as appropriate

## 2020-10-21 ENCOUNTER — CLINICAL SUPPORT (OUTPATIENT)
Dept: REHABILITATION | Facility: HOSPITAL | Age: 50
End: 2020-10-21
Payer: COMMERCIAL

## 2020-10-21 DIAGNOSIS — R07.9 CHEST PAIN, UNSPECIFIED TYPE: ICD-10-CM

## 2020-10-21 DIAGNOSIS — S29.011A MUSCLE STRAIN OF CHEST WALL, INITIAL ENCOUNTER: ICD-10-CM

## 2020-10-21 PROCEDURE — 97140 MANUAL THERAPY 1/> REGIONS: CPT | Mod: PN

## 2020-10-21 PROCEDURE — 97110 THERAPEUTIC EXERCISES: CPT | Mod: PN

## 2020-10-21 NOTE — PROGRESS NOTES
"  Physical Therapy Daily Treatment Note     Name: Nisha Mei  Clinic Number: [id number]    Therapy Diagnosis:   Encounter Diagnoses   Name Primary?    Muscle strain of chest wall, initial encounter     Chest pain, unspecified type      Therapy Diagnosis:   Physician: Alisa Thomas MD    Visit Date: 10/21/2020  Physician Orders: PT Eval and Treat  Medical Diagnosis from Referral: S29.011A (ICD-10-CM) - Muscle strain of chest wall, initial encounter  Evaluation Date: 9/21/2020  Authorization Period Expiration: 12/31/2020  Plan of Care Expiration: 11/20/2020  Visit # / Visits authorized: 8/60  FOTO: 8/10  PTA Visit: 0/6    Time In: 1415  Time Out: 1445  Total Billable Time: 30 minutes TE- 3    Precautions: Standard    Subjective     Pt reports: Pain around her left scapula is gone and she's tried to reproduce it but could not.   She was compliant with home exercise program.  Response to previous treatment: decreased burning  Functional change: no change    Pain: 1/10   Location: left torso      Objective     Nisha received therapeutic exercises to develop strength, endurance, ROM, flexibility and posture for 30 minutes including:    UBE x6 min  Rows BTB 2x10- Not Today  Shoulder extension BTB 2x10- Not Today  Prone I,T,Y with 2# dumbbell 2x10  UT stretch 3x30" each  Corner st 3x30"  Supine punches 3x10 each, 2# dumbbells   Supine horiz abd with RTB 3x10  Supine pec stretch on 1/2 foam roll 5' (abducted and shoulder flex)  Open book 2x10 each- Not Today  Standing I, T, Y 10x (no weight)- Not Today      Not Today  L ulnar nerve glides x20    Nisha received the following manual therapy techniques: Joint mobilizations and Soft tissue Mobilization were applied to the: pecs and costovertebral junction for 0 minutes, including: NOT TODAY    -Gr II mobs PA costovertebral junction L T3-6 - NOT TODAY  -Gr I mobs L SC joint NP - NOT TODAY  -Contract relax L pec minor - NOT TODAY  -MET for T/S rotation B in " quadruped NP - NOT TODAY  -Scapular Release   -Subscap Release  -Cupping to Mid Traps on medial border of scapula, applied for 10 min while working out.     Home Exercises Provided and Patient Education Provided     Education provided:   -keep exercises in a pain free range.    Written Home Exercises Provided: yes.  Exercises were reviewed and Nisha was able to demonstrate them prior to the end of the session.  Nisha demonstrated n/a understanding of the education provided.     See EMR under n/a for exercises provided 10/12/2020.    Assessment   Patient was able to tolerate all exercises during today's treatment without any c/o increased pain. Pt received FDN the last 15 minutes of her session by Denise Stark. Pt only c/o mild discomfort with serratus punch ups around origin of medial pec on L side. Pt progressed to RTB for breuggers. PT will monitor pt's response to FDN next session.     Nisha is not progressing well towards her goals.   Pt prognosis is Good.     Pt will continue to benefit from skilled outpatient physical therapy to address the deficits listed in the problem list box on initial evaluation, provide pt/family education and to maximize pt's level of independence in the home and community environment.     Pt's spiritual, cultural and educational needs considered and pt agreeable to plan of care and goals.    Anticipated barriers to physical therapy: none    Goals: GOALS: Short Term Goals: 4 weeks  1. Pt will demo good deep neck flexor strength to improve cervical lordosis and stabilization. Progressing, not met   2. Increase cervical ROM by 5-10 degrees in order to perform ADLs with decreased difficulty.Progressing, not met   3. Pt will demo good sitting and standing posture for improved spine health and decreased neck pain. Progressing, not met   4. Pt to tolerate HEP to improve ROM and independence with ADL's. Progressing, not met      Long Term Goals: 8 weeks  1. Report decreased L arm and  chest pain </=1/10 to increase tolerance for ADLs and work activities. Progressing, not met   2. Increase cervical AROM to WFL with min neck pain for increased functional mobility.Progressing, not met   3. Increased strength in B shoulders/scapular stabilizers to >/= 4/5 MMT grade to increase tolerance for ADL and work activities. Progressing, not met   4. Pt will report at </= 23% impaired on FOTO neck score for neck pain disability to demonstrate decrease in disability and improvement in neck pain. Progressing, not met     Plan     Continue with Plan Of Care and progress toward PT goals.    Dimas Mckenzie, PT

## 2020-10-21 NOTE — PROGRESS NOTES
Physical Therapy Dry Needling Note         Date:  10/21/2020     Name: Nisha Mei  Clinic Number: [id number]     Therapy Diagnosis:        Encounter Diagnoses   Name Primary?    Muscle strain of chest wall, initial encounter      Chest pain, unspecified type        Physician: Alisa Thomas MD     Start Time:  2:45  Stop Time:  2:53  Total Billable Time: 8 minutes     Subjective      See note by Dimas Mckenzie PT.         Patient verbalized consent to FDN: yes     Objective      Patient provided written and verbal consent to Functional Dry Needling   Nisha received the following manual therapy techniques: IMT to left pec major with stim      Home Exercises Provided and Patient Education Provided      Education provided:   - IMT info and what to expect      Assessment      Patient demonstrated appropriate response to Functional Dry Needling. Good rhythmical contractions observed with estim to treated muscle groups     Plan      Monitor response to Functional Dry Needling. Continue with Functional Dry Needling in POC as appropriate

## 2020-10-26 ENCOUNTER — CLINICAL SUPPORT (OUTPATIENT)
Dept: REHABILITATION | Facility: HOSPITAL | Age: 50
End: 2020-10-26
Payer: COMMERCIAL

## 2020-10-26 DIAGNOSIS — S29.011A MUSCLE STRAIN OF CHEST WALL, INITIAL ENCOUNTER: ICD-10-CM

## 2020-10-26 DIAGNOSIS — R07.9 CHEST PAIN, UNSPECIFIED TYPE: ICD-10-CM

## 2020-10-26 PROCEDURE — 97110 THERAPEUTIC EXERCISES: CPT | Mod: PN

## 2020-10-26 NOTE — PROGRESS NOTES
"  Physical Therapy Daily Treatment Note     Name: Nisha Mei  Clinic Number: [id number]    Therapy Diagnosis:   Encounter Diagnoses   Name Primary?    Muscle strain of chest wall, initial encounter     Chest pain, unspecified type      Therapy Diagnosis:   Physician: Alisa Thomas MD    Visit Date: 10/26/2020  Physician Orders: PT Eval and Treat  Medical Diagnosis from Referral: S29.011A (ICD-10-CM) - Muscle strain of chest wall, initial encounter  Evaluation Date: 9/21/2020  Authorization Period Expiration: 12/31/2020  Plan of Care Expiration: 11/20/2020  Visit # / Visits authorized: 9/60  FOTO: 9/10  PTA Visit: 0/6    Time In: 2:05 pm  Time Out: 2:45 pm  Total Billable Time: 40 minutes TE- 3    Precautions: Standard    Subjective     Pt reports:her left anterior shoulder pain is gone and unable to reproduce any of her pain or chest tenderness. Symptoms down her left arm are very minimal now as well.  She was compliant with home exercise program.  Response to previous treatment: decreased burning and chest tenderness  Functional change: no change    Pain: 1/10   Location: left torso      Objective     Nisha received therapeutic exercises to develop strength, endurance, ROM, flexibility and posture for 40 minutes including:    UBE x6 min, 90 rpm  Prone T's and Y's with 2# dumbbell 2x10, L  UT stretch 3x30" each  Corner st 3x30"  Supine punches 3x10 each, 2# dumbbells   Supine horiz abd with GTB, 2x10, slow return to midline cueing for eccentric pec strengthening  Supine LUE chest press: 2x12 L, 5#  Supine on incline LUE chest press: 2x12 L, 5#  Supine pec stretch on 1/2 foam roll 2' each at 90 and 135 degrees abd  Table pushups: 2x10     Nisha received the following manual therapy techniques: Joint mobilizations and Soft tissue Mobilization were applied to the: pecs and costovertebral junction for 0 minutes, including: NOT TODAY  -Gr II mobs PA costovertebral junction L T3-6 - NOT TODAY  -Gr I " mobs L SC joint NP - NOT TODAY  -Contract relax L pec minor - NOT TODAY    Home Exercises Provided and Patient Education Provided   Education provided:   -keep exercises in a pain free range.    Written Home Exercises Provided: yes.  Exercises were reviewed and Nisha was able to demonstrate them prior to the end of the session.  Nisha demonstrated n/a understanding of the education provided.     See EMR under n/a for exercises provided 10/12/2020.    Assessment   Pt demonstrated and reports significant relief after FDN last visit, and will assess status next visit and likely will perform FDN again. Pt may have to re-schedule this Friday's visit as she will see the dermatologist and get a small procedure that may interfere with wearing a mask so she may not be able to attend therapy. FDN next visit and added on pec major strengthening today for upper and lower portions, pt reported some chest tenderness (very slight discomfort) after session. Al chaudhari will call pt about re-scheduling appointment and additional appointments.    Nisha is not progressing well towards her goals.   Pt prognosis is Good.     Pt will continue to benefit from skilled outpatient physical therapy to address the deficits listed in the problem list box on initial evaluation, provide pt/family education and to maximize pt's level of independence in the home and community environment.     Pt's spiritual, cultural and educational needs considered and pt agreeable to plan of care and goals.    Anticipated barriers to physical therapy: none    Goals: GOALS: Short Term Goals: 4 weeks  1. Pt will demo good deep neck flexor strength to improve cervical lordosis and stabilization. Progressing, not met   2. Increase cervical ROM by 5-10 degrees in order to perform ADLs with decreased difficulty.Progressing, not met   3. Pt will demo good sitting and standing posture for improved spine health and decreased neck pain. Progressing, not met   4. Pt  to tolerate HEP to improve ROM and independence with ADL's. Progressing, not met      Long Term Goals: 8 weeks  1. Report decreased L arm and chest pain </=1/10 to increase tolerance for ADLs and work activities. Progressing, not met   2. Increase cervical AROM to WFL with min neck pain for increased functional mobility.Progressing, not met   3. Increased strength in B shoulders/scapular stabilizers to >/= 4/5 MMT grade to increase tolerance for ADL and work activities. Progressing, not met   4. Pt will report at </= 23% impaired on FOTO neck score for neck pain disability to demonstrate decrease in disability and improvement in neck pain. Progressing, not met     Plan     Continue with Plan Of Care and progress toward PT goals.  FDN next to left pec major.    Wallace Pinzon, PT

## 2020-10-29 ENCOUNTER — TELEPHONE (OUTPATIENT)
Dept: DERMATOLOGY | Facility: CLINIC | Age: 50
End: 2020-10-29

## 2020-10-30 ENCOUNTER — CLINICAL SUPPORT (OUTPATIENT)
Dept: REHABILITATION | Facility: HOSPITAL | Age: 50
End: 2020-10-30
Payer: COMMERCIAL

## 2020-10-30 DIAGNOSIS — R07.9 CHEST PAIN, UNSPECIFIED TYPE: ICD-10-CM

## 2020-10-30 DIAGNOSIS — S29.011A MUSCLE STRAIN OF CHEST WALL, INITIAL ENCOUNTER: ICD-10-CM

## 2020-10-30 PROCEDURE — 97110 THERAPEUTIC EXERCISES: CPT | Mod: PN

## 2020-10-30 NOTE — PROGRESS NOTES
"  Physical Therapy Daily Treatment Note     Name: Nisha Mei  Clinic Number: [id number]    Therapy Diagnosis:   Encounter Diagnoses   Name Primary?    Muscle strain of chest wall, initial encounter     Chest pain, unspecified type      Therapy Diagnosis:   Physician: Alisa Thomas MD    Visit Date: 10/30/2020  Physician Orders: PT Eval and Treat  Medical Diagnosis from Referral: S29.011A (ICD-10-CM) - Muscle strain of chest wall, initial encounter  Evaluation Date: 9/21/2020  Authorization Period Expiration: 12/31/2020  Plan of Care Expiration: 11/20/2020  Visit # / Visits authorized: 10/60  FOTO: 10/10 NEXT  PTA Visit: 0/6    Time In: 2:30 pm  Time Out: 3:20 pm  Total Billable Time: 50 minutes TE- 3    Precautions: Standard    Subjective     Pt reports: she does not have any L arm symptoms, no chest tenderness, and some tenderness along her left upper medial scapular border  She was compliant with home exercise program.  Response to previous treatment: decreased burning and chest tenderness  Functional change: no change    Pain: 0/10   Location: left torso      Objective     Nisha received therapeutic exercises to develop strength, endurance, ROM, flexibility and posture for 40 minutes including:    UBE x6 min, 90 rpm - not done today  Prone T's and Y's with 2# dumbbell 2x10, L - not done today  Rib MET: 3x6'' to rib 4  Corner st 3x30" - not done today, next  Supine punches 2x10, 5'' holds each, 4# dumbbells   Supine horiz abd with GTB, 2x12, slow return to midline  Supine on decline LUE chest press: 3x12 L, 6#  Supine on incline LUE chest press: 3x12 L, 6#  SL shoulder ER: 3x8 L, 2#  Supine pec stretch on 1/2 foam roll 2' each at 90 and 135 degrees abd - d/c next for standing pec stretch  Pushups: 3x10 on // bars    Nisha received the following manual therapy techniques: Joint mobilizations and Soft tissue Mobilization were applied to the: pecs and costovertebral junction for 0 minutes, " including: NOT TODAY  -Gr II mobs PA costovertebral junction L T3-6 - NOT TODAY  -Gr I mobs L SC joint NP - NOT TODAY  -Contract relax L pec minor - NOT TODAY    Home Exercises Provided and Patient Education Provided   Education provided:   -keep exercises in a pain free range.    Written Home Exercises Provided: yes.  Exercises were reviewed and Nisha was able to demonstrate them prior to the end of the session.  Nisha demonstrated n/a understanding of the education provided.     See EMR under n/a for exercises provided 10/12/2020.    Assessment   Pt symptoms continue to improve and she only has some slight tenderness at upper portion of L medial scapular border. This can be possible serratus anterior strain or rib subluxation as stated on eval for differential diagnosis. Pt progressing well regardless and will continue rib MET, pec strengthening, and serratus anterior strengthening.     Nisha is not progressing well towards her goals.   Pt prognosis is Good.     Pt will continue to benefit from skilled outpatient physical therapy to address the deficits listed in the problem list box on initial evaluation, provide pt/family education and to maximize pt's level of independence in the home and community environment.     Pt's spiritual, cultural and educational needs considered and pt agreeable to plan of care and goals.    Anticipated barriers to physical therapy: none    Goals: GOALS: Short Term Goals: 4 weeks  1. Pt will demo good deep neck flexor strength to improve cervical lordosis and stabilization. Progressing, not met   2. Increase cervical ROM by 5-10 degrees in order to perform ADLs with decreased difficulty.Progressing, not met   3. Pt will demo good sitting and standing posture for improved spine health and decreased neck pain. Progressing, not met   4. Pt to tolerate HEP to improve ROM and independence with ADL's. Progressing, not met      Long Term Goals: 8 weeks  1. Report decreased L arm and  chest pain </=1/10 to increase tolerance for ADLs and work activities. Progressing, not met   2. Increase cervical AROM to WFL with min neck pain for increased functional mobility.Progressing, not met   3. Increased strength in B shoulders/scapular stabilizers to >/= 4/5 MMT grade to increase tolerance for ADL and work activities. Progressing, not met   4. Pt will report at </= 23% impaired on FOTO neck score for neck pain disability to demonstrate decrease in disability and improvement in neck pain. Progressing, not met     Plan     Continue with Plan Of Care and progress toward PT goals.  FDN next to left pec major.    Wallace Pinzon, PT

## 2020-11-03 ENCOUNTER — CLINICAL SUPPORT (OUTPATIENT)
Dept: REHABILITATION | Facility: HOSPITAL | Age: 50
End: 2020-11-03
Payer: COMMERCIAL

## 2020-11-03 DIAGNOSIS — R07.9 CHEST PAIN, UNSPECIFIED TYPE: ICD-10-CM

## 2020-11-03 DIAGNOSIS — S29.011A MUSCLE STRAIN OF CHEST WALL, INITIAL ENCOUNTER: ICD-10-CM

## 2020-11-03 PROCEDURE — 97110 THERAPEUTIC EXERCISES: CPT | Mod: PN

## 2020-11-03 NOTE — PROGRESS NOTES
Physical Therapy Daily Treatment Note/ Discharge Summary     Name: Nisha Mei  Clinic Number: [id number]    Therapy Diagnosis:   Encounter Diagnoses   Name Primary?    Muscle strain of chest wall, initial encounter     Chest pain, unspecified type      Therapy Diagnosis:   Physician: Alisa Thomas MD    Visit Date: 11/3/2020  Physician Orders: PT Eval and Treat  Medical Diagnosis from Referral: S29.011A (ICD-10-CM) - Muscle strain of chest wall, initial encounter  Evaluation Date: 9/21/2020  Authorization Period Expiration: 12/31/2020  Plan of Care Expiration: 11/20/2020  Visit # / Visits authorized: 11/60  FOTO: 11  PTA Visit: 0/6    Time In: 1:45 pm  Time Out: 2:00 pm  Total Billable Time: 15 minutes     Precautions: Standard    Subjective     Pt reports: she does not have any pain and is ready for discharge.   She was compliant with home exercise program.  Response to previous treatment: decreased overall pain  Functional change: no change    Pain: 0/10   Location: left torso      Objective     Nisha received therapeutic exercises and assessment to develop strength, endurance, ROM, flexibility and posture for 15 minutes including:    AROM: CERVICAL   Flexion 100%   Extension 100%   Right side bending 100%   Left side bending 100%   Right rotation 100%   Left rotation 100%      Shoulder   Right     Left   Pain/Dysfunction with Movement     AROM PROM MMT AROM PROM MMT     Serratus anterior WFL WFL 5/5 WFL WFL 5/5     flexion WFL WFL 5/5 WFL WFL 5/5     extension WFL WFL 5/5 WFL WFL 5/5     Abduction (C5) WFL WFL 5/5 WFL WFL 5/5     adduction WFL WFL 5/5 WFL WFL 5/5     Internal rotation WFL WFL 5/5 WFL WFL 5/5    ER at 90° abd WFL WFL NT WFL WFL NT     ER at 0° abd WFL WFL 5/5 WFL WFL 5/5       FOTO score= 0% limited     UBE x6 min, 90 rpm      Home Exercises Provided and Patient Education Provided   Education provided:   -cont HEP    Written Home Exercises Provided: yes.  Exercises were  reviewed and Nisha was able to demonstrate them prior to the end of the session.  Nisha demonstrated n/a understanding of the education provided.     See EMR under n/a for exercises provided 10/12/2020.    Assessment     Pt re-assessed today for discharge and patient has met all goals. Patient reports her symptoms are 100% manageable and she no longer has the same chest and scapular pain she was having before. Patient to continue HEP and will call the clinic with any questions.     Goals: GOALS: Short Term Goals: 4 weeks  1. Pt will demo good deep neck flexor strength to improve cervical lordosis and stabilization. (Met 11/3/20)  2. Increase cervical ROM by 5-10 degrees in order to perform ADLs with decreased difficulty. (Met 11/3/20)  3. Pt will demo good sitting and standing posture for improved spine health and decreased neck pain. (Met 11/3/20)  4. Pt to tolerate HEP to improve ROM and independence with ADL's. (Met 11/3/20)     Long Term Goals: 8 weeks  1. Report decreased L arm and chest pain </=1/10 to increase tolerance for ADLs and work activities. (Met 11/3/20)  2. Increase cervical AROM to WFL with min neck pain for increased functional mobility. (Met 11/3/20)  3. Increased strength in B shoulders/scapular stabilizers to >/= 4/5 MMT grade to increase tolerance for ADL and work activities. (Met 11/3/20)  4. Pt will report at </= 23% impaired on FOTO neck score for neck pain disability to demonstrate decrease in disability and improvement in neck pain. (Met 11/3/20)    Plan     Discharge from physical therapy.    Lucy Stark, PT

## 2020-12-11 ENCOUNTER — OFFICE VISIT (OUTPATIENT)
Dept: DERMATOLOGY | Facility: CLINIC | Age: 50
End: 2020-12-11
Payer: COMMERCIAL

## 2020-12-11 DIAGNOSIS — Z87.2 HISTORY OF ACTINIC KERATOSIS: Primary | ICD-10-CM

## 2020-12-11 PROCEDURE — 99999 PR PBB SHADOW E&M-EST. PATIENT-LVL III: CPT | Mod: PBBFAC,,, | Performed by: DERMATOLOGY

## 2020-12-11 PROCEDURE — 1126F PR PAIN SEVERITY QUANTIFIED, NO PAIN PRESENT: ICD-10-PCS | Mod: S$GLB,,, | Performed by: DERMATOLOGY

## 2020-12-11 PROCEDURE — 1126F AMNT PAIN NOTED NONE PRSNT: CPT | Mod: S$GLB,,, | Performed by: DERMATOLOGY

## 2020-12-11 PROCEDURE — 99212 PR OFFICE/OUTPT VISIT, EST, LEVL II, 10-19 MIN: ICD-10-PCS | Mod: S$GLB,,, | Performed by: DERMATOLOGY

## 2020-12-11 PROCEDURE — 99999 PR PBB SHADOW E&M-EST. PATIENT-LVL III: ICD-10-PCS | Mod: PBBFAC,,, | Performed by: DERMATOLOGY

## 2020-12-11 PROCEDURE — 99212 OFFICE O/P EST SF 10 MIN: CPT | Mod: S$GLB,,, | Performed by: DERMATOLOGY

## 2020-12-11 NOTE — PATIENT INSTRUCTIONS

## 2020-12-11 NOTE — PROGRESS NOTES
Subjective:       Patient ID:  Nisha Mei is a 50 y.o. female who presents for   Chief Complaint   Patient presents with    Spot     Pt presents today for f/u, L cheek, Tx Picato     HPI  Pt here today for f/u after treating a bx-proven AK on L lower cheek with Picato. Pt states the area sloughed off after treatment and hasn't recurred since. Only has minor discoloration left behind, but no roughness or scaliness. No other concerns today.    Review of Systems   Constitutional: Negative for fever, chills, weight loss, weight gain, fatigue and malaise.   Respiratory: Negative for shortness of breath.    Musculoskeletal: Negative for joint swelling and arthralgias.   Skin: Negative for daily sunscreen use, activity-related sunscreen use and wears hat.   Hematologic/Lymphatic: Does not bruise/bleed easily.        Objective:    Physical Exam   Constitutional: She appears well-developed and well-nourished.   Neurological: She is alert and oriented to person, place, and time.   Psychiatric: She has a normal mood and affect.   Skin:   Areas Examined (abnormalities noted in diagram):   Head / Face Inspection Performed              Diagram Legend     Erythematous scaling macule/papule c/w actinic keratosis       Vascular papule c/w angioma      Pigmented verrucoid papule/plaque c/w seborrheic keratosis      Yellow umbilicated papule c/w sebaceous hyperplasia      Irregularly shaped tan macule c/w lentigo     1-2 mm smooth white papules consistent with Milia      Movable subcutaneous cyst with punctum c/w epidermal inclusion cyst      Subcutaneous movable cyst c/w pilar cyst      Firm pink to brown papule c/w dermatofibroma      Pedunculated fleshy papule(s) c/w skin tag(s)      Evenly pigmented macule c/w junctional nevus     Mildly variegated pigmented, slightly irregular-bordered macule c/w mildly atypical nevus      Flesh colored to evenly pigmented papule c/w intradermal nevus       Pink pearly papule/plaque  c/w basal cell carcinoma      Erythematous hyperkeratotic cursted plaque c/w SCC      Surgical scar with no sign of skin cancer recurrence      Open and closed comedones      Inflammatory papules and pustules      Verrucoid papule consistent consistent with wart     Erythematous eczematous patches and plaques     Dystrophic onycholytic nail with subungual debris c/w onychomycosis     Umbilicated papule    Erythematous-base heme-crusted tan verrucoid plaque consistent with inflamed seborrheic keratosis     Erythematous Silvery Scaling Plaque c/w Psoriasis     See annotation      Assessment / Plan:        History of actinic keratosis  Clear today.  No other concerns.    Follow up in about 6 months (around 6/11/2021) for skin check or sooner for any concerns.

## 2021-01-04 ENCOUNTER — TELEPHONE (OUTPATIENT)
Dept: PRIMARY CARE CLINIC | Facility: OTHER | Age: 51
End: 2021-01-04

## 2021-01-04 ENCOUNTER — CLINICAL SUPPORT (OUTPATIENT)
Dept: URGENT CARE | Facility: CLINIC | Age: 51
End: 2021-01-04
Payer: COMMERCIAL

## 2021-01-04 DIAGNOSIS — J02.9 SORE THROAT: Primary | ICD-10-CM

## 2021-01-04 DIAGNOSIS — Z20.828 EXPOSURE TO SARS-ASSOCIATED CORONAVIRUS: ICD-10-CM

## 2021-01-04 DIAGNOSIS — J02.9 SORE THROAT: ICD-10-CM

## 2021-01-04 LAB
CTP QC/QA: YES
SARS-COV-2 RDRP RESP QL NAA+PROBE: NEGATIVE

## 2021-01-04 PROCEDURE — U0002 COVID-19 LAB TEST NON-CDC: HCPCS | Mod: QW,S$GLB,, | Performed by: INTERNAL MEDICINE

## 2021-01-04 PROCEDURE — U0002: ICD-10-PCS | Mod: QW,S$GLB,, | Performed by: INTERNAL MEDICINE

## 2021-01-05 ENCOUNTER — PATIENT MESSAGE (OUTPATIENT)
Dept: ADMINISTRATIVE | Facility: HOSPITAL | Age: 51
End: 2021-01-05

## 2021-01-09 ENCOUNTER — CLINICAL SUPPORT (OUTPATIENT)
Dept: URGENT CARE | Facility: CLINIC | Age: 51
End: 2021-01-09
Payer: COMMERCIAL

## 2021-01-09 ENCOUNTER — PATIENT MESSAGE (OUTPATIENT)
Dept: INTERNAL MEDICINE | Facility: CLINIC | Age: 51
End: 2021-01-09

## 2021-01-09 DIAGNOSIS — Z78.9 NO KNOWN HEALTH PROBLEMS: Primary | ICD-10-CM

## 2021-01-09 LAB
CTP QC/QA: YES
SARS-COV-2 RDRP RESP QL NAA+PROBE: NEGATIVE

## 2021-01-09 PROCEDURE — 99211 OFF/OP EST MAY X REQ PHY/QHP: CPT | Mod: S$GLB,,, | Performed by: NURSE PRACTITIONER

## 2021-01-09 PROCEDURE — 99211 PR OFFICE/OUTPT VISIT, EST, LEVL I: ICD-10-PCS | Mod: S$GLB,,, | Performed by: NURSE PRACTITIONER

## 2021-01-09 PROCEDURE — U0002: ICD-10-PCS | Mod: QW,S$GLB,, | Performed by: NURSE PRACTITIONER

## 2021-01-09 PROCEDURE — U0002 COVID-19 LAB TEST NON-CDC: HCPCS | Mod: QW,S$GLB,, | Performed by: NURSE PRACTITIONER

## 2021-01-11 ENCOUNTER — PATIENT MESSAGE (OUTPATIENT)
Dept: INTERNAL MEDICINE | Facility: CLINIC | Age: 51
End: 2021-01-11

## 2021-01-11 DIAGNOSIS — Z20.822 EXPOSURE TO COVID-19 VIRUS: Primary | ICD-10-CM

## 2021-01-11 DIAGNOSIS — R05.9 COUGH: Primary | ICD-10-CM

## 2021-01-12 ENCOUNTER — LAB VISIT (OUTPATIENT)
Dept: LAB | Facility: HOSPITAL | Age: 51
End: 2021-01-12
Attending: INTERNAL MEDICINE
Payer: COMMERCIAL

## 2021-01-12 DIAGNOSIS — Z20.822 EXPOSURE TO COVID-19 VIRUS: ICD-10-CM

## 2021-01-12 PROCEDURE — 36415 COLL VENOUS BLD VENIPUNCTURE: CPT | Mod: PO

## 2021-01-12 PROCEDURE — 86769 SARS-COV-2 COVID-19 ANTIBODY: CPT

## 2021-01-13 LAB — SARS-COV-2 IGG SERPLBLD QL IA.RAPID: NEGATIVE

## 2021-04-05 ENCOUNTER — PATIENT MESSAGE (OUTPATIENT)
Dept: ADMINISTRATIVE | Facility: HOSPITAL | Age: 51
End: 2021-04-05

## 2021-04-09 ENCOUNTER — PATIENT MESSAGE (OUTPATIENT)
Dept: ADMINISTRATIVE | Facility: HOSPITAL | Age: 51
End: 2021-04-09

## 2021-07-06 ENCOUNTER — PATIENT MESSAGE (OUTPATIENT)
Dept: ADMINISTRATIVE | Facility: HOSPITAL | Age: 51
End: 2021-07-06

## 2021-10-03 ENCOUNTER — PATIENT MESSAGE (OUTPATIENT)
Dept: INTERNAL MEDICINE | Facility: CLINIC | Age: 51
End: 2021-10-03

## 2021-11-09 ENCOUNTER — OFFICE VISIT (OUTPATIENT)
Dept: INTERNAL MEDICINE | Facility: CLINIC | Age: 51
End: 2021-11-09
Payer: COMMERCIAL

## 2021-11-09 VITALS
HEIGHT: 62 IN | OXYGEN SATURATION: 100 % | BODY MASS INDEX: 33.71 KG/M2 | WEIGHT: 183.19 LBS | HEART RATE: 67 BPM | SYSTOLIC BLOOD PRESSURE: 100 MMHG | DIASTOLIC BLOOD PRESSURE: 70 MMHG

## 2021-11-09 DIAGNOSIS — Z12.31 SCREENING MAMMOGRAM FOR BREAST CANCER: ICD-10-CM

## 2021-11-09 DIAGNOSIS — R10.2 PELVIC PAIN: ICD-10-CM

## 2021-11-09 DIAGNOSIS — Z00.00 ANNUAL PHYSICAL EXAM: Primary | ICD-10-CM

## 2021-11-09 PROCEDURE — 99396 PR PREVENTIVE VISIT,EST,40-64: ICD-10-PCS | Mod: S$GLB,,, | Performed by: INTERNAL MEDICINE

## 2021-11-09 PROCEDURE — 3008F BODY MASS INDEX DOCD: CPT | Mod: CPTII,S$GLB,, | Performed by: INTERNAL MEDICINE

## 2021-11-09 PROCEDURE — 1159F MED LIST DOCD IN RCRD: CPT | Mod: CPTII,S$GLB,, | Performed by: INTERNAL MEDICINE

## 2021-11-09 PROCEDURE — 99396 PREV VISIT EST AGE 40-64: CPT | Mod: S$GLB,,, | Performed by: INTERNAL MEDICINE

## 2021-11-09 PROCEDURE — 3074F SYST BP LT 130 MM HG: CPT | Mod: CPTII,S$GLB,, | Performed by: INTERNAL MEDICINE

## 2021-11-09 PROCEDURE — 3078F PR MOST RECENT DIASTOLIC BLOOD PRESSURE < 80 MM HG: ICD-10-PCS | Mod: CPTII,S$GLB,, | Performed by: INTERNAL MEDICINE

## 2021-11-09 PROCEDURE — 87624 HPV HI-RISK TYP POOLED RSLT: CPT | Performed by: INTERNAL MEDICINE

## 2021-11-09 PROCEDURE — 99999 PR PBB SHADOW E&M-EST. PATIENT-LVL III: CPT | Mod: PBBFAC,,, | Performed by: INTERNAL MEDICINE

## 2021-11-09 PROCEDURE — 88175 CYTOPATH C/V AUTO FLUID REDO: CPT | Performed by: INTERNAL MEDICINE

## 2021-11-09 PROCEDURE — 3074F PR MOST RECENT SYSTOLIC BLOOD PRESSURE < 130 MM HG: ICD-10-PCS | Mod: CPTII,S$GLB,, | Performed by: INTERNAL MEDICINE

## 2021-11-09 PROCEDURE — 3078F DIAST BP <80 MM HG: CPT | Mod: CPTII,S$GLB,, | Performed by: INTERNAL MEDICINE

## 2021-11-09 PROCEDURE — 1159F PR MEDICATION LIST DOCUMENTED IN MEDICAL RECORD: ICD-10-PCS | Mod: CPTII,S$GLB,, | Performed by: INTERNAL MEDICINE

## 2021-11-09 PROCEDURE — 99999 PR PBB SHADOW E&M-EST. PATIENT-LVL III: ICD-10-PCS | Mod: PBBFAC,,, | Performed by: INTERNAL MEDICINE

## 2021-11-09 PROCEDURE — 3008F PR BODY MASS INDEX (BMI) DOCUMENTED: ICD-10-PCS | Mod: CPTII,S$GLB,, | Performed by: INTERNAL MEDICINE

## 2021-11-11 ENCOUNTER — LAB VISIT (OUTPATIENT)
Dept: LAB | Facility: HOSPITAL | Age: 51
End: 2021-11-11
Attending: INTERNAL MEDICINE
Payer: COMMERCIAL

## 2021-11-11 DIAGNOSIS — Z00.00 ANNUAL PHYSICAL EXAM: ICD-10-CM

## 2021-11-11 LAB
ALBUMIN SERPL BCP-MCNC: 4 G/DL (ref 3.5–5.2)
ALP SERPL-CCNC: 58 U/L (ref 55–135)
ALT SERPL W/O P-5'-P-CCNC: 16 U/L (ref 10–44)
ANION GAP SERPL CALC-SCNC: 8 MMOL/L (ref 8–16)
AST SERPL-CCNC: 16 U/L (ref 10–40)
BASOPHILS # BLD AUTO: 0.04 K/UL (ref 0–0.2)
BASOPHILS NFR BLD: 0.7 % (ref 0–1.9)
BILIRUB SERPL-MCNC: 1.2 MG/DL (ref 0.1–1)
BUN SERPL-MCNC: 10 MG/DL (ref 6–20)
CALCIUM SERPL-MCNC: 9.2 MG/DL (ref 8.7–10.5)
CHLORIDE SERPL-SCNC: 107 MMOL/L (ref 95–110)
CHOLEST SERPL-MCNC: 188 MG/DL (ref 120–199)
CHOLEST/HDLC SERPL: 2.8 {RATIO} (ref 2–5)
CO2 SERPL-SCNC: 24 MMOL/L (ref 23–29)
CREAT SERPL-MCNC: 0.8 MG/DL (ref 0.5–1.4)
DIFFERENTIAL METHOD: NORMAL
EOSINOPHIL # BLD AUTO: 0.2 K/UL (ref 0–0.5)
EOSINOPHIL NFR BLD: 3.3 % (ref 0–8)
ERYTHROCYTE [DISTWIDTH] IN BLOOD BY AUTOMATED COUNT: 11.9 % (ref 11.5–14.5)
EST. GFR  (AFRICAN AMERICAN): >60 ML/MIN/1.73 M^2
EST. GFR  (NON AFRICAN AMERICAN): >60 ML/MIN/1.73 M^2
ESTIMATED AVG GLUCOSE: 91 MG/DL (ref 68–131)
GLUCOSE SERPL-MCNC: 95 MG/DL (ref 70–110)
HBA1C MFR BLD: 4.8 % (ref 4–5.6)
HCT VFR BLD AUTO: 37.5 % (ref 37–48.5)
HDLC SERPL-MCNC: 66 MG/DL (ref 40–75)
HDLC SERPL: 35.1 % (ref 20–50)
HGB BLD-MCNC: 13.1 G/DL (ref 12–16)
IMM GRANULOCYTES # BLD AUTO: 0.01 K/UL (ref 0–0.04)
IMM GRANULOCYTES NFR BLD AUTO: 0.2 % (ref 0–0.5)
LDLC SERPL CALC-MCNC: 108.2 MG/DL (ref 63–159)
LYMPHOCYTES # BLD AUTO: 2.1 K/UL (ref 1–4.8)
LYMPHOCYTES NFR BLD: 37.5 % (ref 18–48)
MCH RBC QN AUTO: 30.8 PG (ref 27–31)
MCHC RBC AUTO-ENTMCNC: 34.9 G/DL (ref 32–36)
MCV RBC AUTO: 88 FL (ref 82–98)
MONOCYTES # BLD AUTO: 0.4 K/UL (ref 0.3–1)
MONOCYTES NFR BLD: 8 % (ref 4–15)
NEUTROPHILS # BLD AUTO: 2.8 K/UL (ref 1.8–7.7)
NEUTROPHILS NFR BLD: 50.3 % (ref 38–73)
NONHDLC SERPL-MCNC: 122 MG/DL
NRBC BLD-RTO: 0 /100 WBC
PLATELET # BLD AUTO: 286 K/UL (ref 150–450)
PMV BLD AUTO: 10.4 FL (ref 9.2–12.9)
POTASSIUM SERPL-SCNC: 3.8 MMOL/L (ref 3.5–5.1)
PROT SERPL-MCNC: 7.1 G/DL (ref 6–8.4)
RBC # BLD AUTO: 4.25 M/UL (ref 4–5.4)
SODIUM SERPL-SCNC: 139 MMOL/L (ref 136–145)
TRIGL SERPL-MCNC: 69 MG/DL (ref 30–150)
TSH SERPL DL<=0.005 MIU/L-ACNC: 1.39 UIU/ML (ref 0.4–4)
WBC # BLD AUTO: 5.47 K/UL (ref 3.9–12.7)

## 2021-11-11 PROCEDURE — 80053 COMPREHEN METABOLIC PANEL: CPT | Performed by: INTERNAL MEDICINE

## 2021-11-11 PROCEDURE — 36415 COLL VENOUS BLD VENIPUNCTURE: CPT | Performed by: INTERNAL MEDICINE

## 2021-11-11 PROCEDURE — 80061 LIPID PANEL: CPT | Performed by: INTERNAL MEDICINE

## 2021-11-11 PROCEDURE — 84443 ASSAY THYROID STIM HORMONE: CPT | Performed by: INTERNAL MEDICINE

## 2021-11-11 PROCEDURE — 83036 HEMOGLOBIN GLYCOSYLATED A1C: CPT | Performed by: INTERNAL MEDICINE

## 2021-11-11 PROCEDURE — 85025 COMPLETE CBC W/AUTO DIFF WBC: CPT | Performed by: INTERNAL MEDICINE

## 2021-11-11 PROCEDURE — 86803 HEPATITIS C AB TEST: CPT | Performed by: INTERNAL MEDICINE

## 2021-11-12 LAB — HCV AB SERPL QL IA: NEGATIVE

## 2021-11-16 LAB
HPV HR 12 DNA SPEC QL NAA+PROBE: NEGATIVE
HPV16 AG SPEC QL: NEGATIVE
HPV18 DNA SPEC QL NAA+PROBE: NEGATIVE

## 2021-11-19 DIAGNOSIS — B96.89 BACTERIAL VAGINOSIS: Primary | ICD-10-CM

## 2021-11-19 DIAGNOSIS — N76.0 BACTERIAL VAGINOSIS: Primary | ICD-10-CM

## 2021-11-19 LAB
FINAL PATHOLOGIC DIAGNOSIS: NORMAL
Lab: NORMAL

## 2021-11-19 RX ORDER — METRONIDAZOLE 500 MG/1
500 TABLET ORAL EVERY 12 HOURS
Qty: 14 TABLET | Refills: 0 | Status: SHIPPED | OUTPATIENT
Start: 2021-11-19 | End: 2021-11-26

## 2021-12-15 ENCOUNTER — HOSPITAL ENCOUNTER (OUTPATIENT)
Dept: RADIOLOGY | Facility: HOSPITAL | Age: 51
Discharge: HOME | End: 2021-12-15
Attending: INTERNAL MEDICINE
Payer: COMMERCIAL

## 2021-12-15 DIAGNOSIS — Z12.31 SCREENING MAMMOGRAM FOR BREAST CANCER: ICD-10-CM

## 2021-12-15 DIAGNOSIS — R10.2 PELVIC PAIN: ICD-10-CM

## 2021-12-15 PROCEDURE — 77067 SCR MAMMO BI INCL CAD: CPT | Mod: TC

## 2021-12-15 PROCEDURE — 76856 US PELVIS COMP WITH TRANSVAG NON-OB (XPD): ICD-10-PCS | Mod: 26,,, | Performed by: RADIOLOGY

## 2021-12-15 PROCEDURE — 77067 MAMMO DIGITAL SCREENING BILAT WITH TOMO: ICD-10-PCS | Mod: 26,,, | Performed by: RADIOLOGY

## 2021-12-15 PROCEDURE — 76830 TRANSVAGINAL US NON-OB: CPT | Mod: 26,,, | Performed by: RADIOLOGY

## 2021-12-15 PROCEDURE — 77067 SCR MAMMO BI INCL CAD: CPT | Mod: 26,,, | Performed by: RADIOLOGY

## 2021-12-15 PROCEDURE — 76830 US PELVIS COMP WITH TRANSVAG NON-OB (XPD): ICD-10-PCS | Mod: 26,,, | Performed by: RADIOLOGY

## 2021-12-15 PROCEDURE — 76856 US EXAM PELVIC COMPLETE: CPT | Mod: TC

## 2021-12-15 PROCEDURE — 77063 BREAST TOMOSYNTHESIS BI: CPT | Mod: 26,,, | Performed by: RADIOLOGY

## 2021-12-15 PROCEDURE — 76856 US EXAM PELVIC COMPLETE: CPT | Mod: 26,,, | Performed by: RADIOLOGY

## 2021-12-15 PROCEDURE — 77063 MAMMO DIGITAL SCREENING BILAT WITH TOMO: ICD-10-PCS | Mod: 26,,, | Performed by: RADIOLOGY

## 2022-01-03 ENCOUNTER — LAB VISIT (OUTPATIENT)
Dept: PRIMARY CARE CLINIC | Facility: OTHER | Age: 52
End: 2022-01-03

## 2022-01-03 DIAGNOSIS — R09.89 RUNNY NOSE: ICD-10-CM

## 2022-01-03 DIAGNOSIS — R52 BODY ACHES: ICD-10-CM

## 2022-01-03 DIAGNOSIS — J02.9 SORE THROAT: ICD-10-CM

## 2022-01-03 DIAGNOSIS — R51.9 NONINTRACTABLE HEADACHE, UNSPECIFIED CHRONICITY PATTERN, UNSPECIFIED HEADACHE TYPE: Primary | ICD-10-CM

## 2022-01-03 LAB
CTP QC/QA: YES
SARS-COV-2 AG RESP QL IA.RAPID: NEGATIVE

## 2022-03-18 ENCOUNTER — PATIENT MESSAGE (OUTPATIENT)
Dept: ADMINISTRATIVE | Facility: HOSPITAL | Age: 52
End: 2022-03-18
Payer: COMMERCIAL

## 2022-03-19 DIAGNOSIS — Z12.11 SCREENING FOR COLON CANCER: ICD-10-CM

## 2022-06-01 DIAGNOSIS — Z12.11 COLON CANCER SCREENING: ICD-10-CM

## 2022-07-28 ENCOUNTER — TELEPHONE (OUTPATIENT)
Dept: DERMATOLOGY | Facility: CLINIC | Age: 52
End: 2022-07-28
Payer: COMMERCIAL

## 2022-07-28 NOTE — TELEPHONE ENCOUNTER
Left pt a message letting her know that we need to reschedule her appointment for another date since Dr. Bro will not be in on August 24th.

## 2022-07-29 ENCOUNTER — PATIENT MESSAGE (OUTPATIENT)
Dept: ADMINISTRATIVE | Facility: HOSPITAL | Age: 52
End: 2022-07-29
Payer: COMMERCIAL

## 2022-07-29 ENCOUNTER — PATIENT OUTREACH (OUTPATIENT)
Dept: ADMINISTRATIVE | Facility: HOSPITAL | Age: 52
End: 2022-07-29
Payer: COMMERCIAL

## 2022-07-29 NOTE — PROGRESS NOTES
07/29/2022 Gap report audit performed. Care Everywhere updates requested and reviewed  Overdue HM topic chart audit and/or requested. LINKS triggered and reconciled. Media reviewed. Patient outreach regarding Health Maintenance- left VM /portal message sent.     Health Maintenance Due   Topic Date Due    Colorectal Cancer Screening  Never done    COVID-19 Vaccine (3 - Booster for Moderna series) 07/25/2021    Shingles Vaccine (1 of 2) Never done

## 2022-09-19 ENCOUNTER — OFFICE VISIT (OUTPATIENT)
Dept: DERMATOLOGY | Facility: CLINIC | Age: 52
End: 2022-09-19
Payer: COMMERCIAL

## 2022-09-19 DIAGNOSIS — L82.1 SEBORRHEIC KERATOSIS: ICD-10-CM

## 2022-09-19 DIAGNOSIS — L91.8 ACROCHORDON: ICD-10-CM

## 2022-09-19 DIAGNOSIS — L81.4 LENTIGINES: Primary | ICD-10-CM

## 2022-09-19 PROCEDURE — 1159F PR MEDICATION LIST DOCUMENTED IN MEDICAL RECORD: ICD-10-PCS | Mod: CPTII,S$GLB,, | Performed by: DERMATOLOGY

## 2022-09-19 PROCEDURE — 1160F RVW MEDS BY RX/DR IN RCRD: CPT | Mod: CPTII,S$GLB,, | Performed by: DERMATOLOGY

## 2022-09-19 PROCEDURE — 99214 OFFICE O/P EST MOD 30 MIN: CPT | Mod: S$GLB,,, | Performed by: DERMATOLOGY

## 2022-09-19 PROCEDURE — 99214 PR OFFICE/OUTPT VISIT, EST, LEVL IV, 30-39 MIN: ICD-10-PCS | Mod: S$GLB,,, | Performed by: DERMATOLOGY

## 2022-09-19 PROCEDURE — 99999 PR PBB SHADOW E&M-EST. PATIENT-LVL II: ICD-10-PCS | Mod: PBBFAC,,, | Performed by: DERMATOLOGY

## 2022-09-19 PROCEDURE — 1159F MED LIST DOCD IN RCRD: CPT | Mod: CPTII,S$GLB,, | Performed by: DERMATOLOGY

## 2022-09-19 PROCEDURE — 99999 PR PBB SHADOW E&M-EST. PATIENT-LVL II: CPT | Mod: PBBFAC,,, | Performed by: DERMATOLOGY

## 2022-09-19 PROCEDURE — 1160F PR REVIEW ALL MEDS BY PRESCRIBER/CLIN PHARMACIST DOCUMENTED: ICD-10-PCS | Mod: CPTII,S$GLB,, | Performed by: DERMATOLOGY

## 2022-09-19 NOTE — PATIENT INSTRUCTIONS
CRYOSURGERY      Your doctor has used a method called cryosurgery to treat your skin condition. Cryosurgery refers to the use of very cold substances to treat a variety of skin conditions such as warts, pre-skin cancers, molluscum contagiosum, sun spots, and several benign growths. The substance we use in cryosurgery is liquid nitrogen and is so cold (-195 degrees Celsius) that is burns when administered.     Following treatment in the office, the skin may immediately burn and become red. You may find the area around the lesion is affected as well. It is sometimes necessary to treat not only the lesion, but a small area of the surrounding normal skin to achieve a good response.     A blister, and even a blood filled blister, may form after treatment.   This is a normal response. If the blister is painful, it is acceptable to sterilize a needle and with rubbing alcohol and gently pop the blister. It is important that you gently wash the area with soap and warm water as the blister fluid may contain wart virus if a wart was treated. Do no remove the roof of the blister.     The area treated can take anywhere from 1-3 weeks to heal. Healing time depends on the kind of skin lesion treated, the location, and how aggressively the lesion was treated. It is recommended that the areas treated are covered with Vaseline or bacitracin ointment and a band-aid. If a band-aid is not practical, just ointment applied several times per day will do. Keeping these areas moist will speed the healing time.    Treatment with liquid nitrogen can leave a scar. In dark skin, it may be a light or dark scar, in light skin it may be a white or pink scar. These will generally fade with time.    If you have any concerns after your treatment, please feel free to call the office.         [facility] - DERMATOLOGY  [address]  [facility] [place] [address]  Dept: [phone]  Dept Fax: [phone]    Monitoring  Moles  Moles, also called nevi, are small, pigmented (colored) marks on the skin. They have no known purpose. Many moles appear before age 30, but they also increase frequently as people age. Moles most often are benign (not cancer) and harmless. But some become cancerous. Thats why you need to watch the moles on your body and tell your health care provider about any concern you.    What are moles?  Moles are a type of pigmented nikita. Freckles, which often are sprinkled across the bridge of the nose, the cheeks, and the arms, are another type of pigmented nikita. Moles can appear on any part of the body. There are many types, sizes, and shapes of moles. Most moles are solid brown. In most cases, they are flat or dome-shaped, smooth, and have well-defined edges. Freckles are flat.  Why worry about moles?  Most moles are benign and dont require treatment. You can have moles removed if you dont like the way they look or feel. But moles that appear after you are 30 or that change in certain ways may become a problem. These moles may turn into melanoma, a type of skin cancer. Melanoma is one of the fastest growing cancers in the [place], but it is often curable if caught early. But this disease can be life-threatening, particularly when not diagnosed early. The more moles someone has, the higher the risk. Risk is also higher for those who have had more lifetime exposure to the sun, severe blistering sunburns, exposure to tanning beds, a prior personal history of cancer, and those with a family history of skin cancer. To manage your risk, its smart to check your moles for changes and ask your health care provider to perform a thorough skin exam when you have a physical exam. To do this, you first need to learn where your moles are. Then, be sure to check your moles each month.    Checking your moles  You can check many of your moles each month. You can do this right after you shower and before you get dressed.  Check your body from head to toe. Then, make a list of your moles. If you find any new moles or changes in your moles, call your health care provider. To check your moles, youll need:  A full-length mirror  A stool or chair to sit on while you check your feet  If you have a lot of moles, take digital photos of them each month. Make sure to take photos both up close and from a distance. These can help you see if any moles change over time.  When to seek medical treatment  See your health care provider if your moles hurt, itch, ooze, bleed, thicken, become crusty, or show other changes. Also, be sure to call your health care provider if your moles show any of the following signs of melanoma:  A change in size, shape, color, or elevation  Asymmetry (when the sides dont match)  Ragged, notched, or blurred borders  Varied colors within the same mole  Size is larger than 5 mm or 6 mm in diameter (the size of a pencil eraser)  © 5330-3254 [employer]. [address] [place] [address]. All rights reserved. This information is not intended as a substitute for professional medical care. Always follow your healthcare professional's instructions.

## 2022-09-19 NOTE — PROGRESS NOTES
Subjective:       Patient ID:  Nisha Mei is a 51 y.o. female who presents for   Chief Complaint   Patient presents with    Skin Check     Hx of AK of the left lower cheek, last seen by Dr. Bro on 12/11/20. She c/o of several new scaly areas of the back.    No tx tried.     The patient denies personal or family history of skin cancer.        Review of Systems   Constitutional:  Negative for fever and chills.   Gastrointestinal:  Negative for nausea and vomiting.   Skin:  Positive for activity-related sunscreen use. Negative for daily sunscreen use and recent sunburn.   Hematologic/Lymphatic: Does not bruise/bleed easily.      Objective:    Physical Exam   Constitutional: She appears well-developed and well-nourished. No distress.   Neurological: She is alert and oriented to person, place, and time. She is not disoriented.   Psychiatric: She has a normal mood and affect.   Skin:   Areas Examined (abnormalities noted in diagram):   Scalp / Hair Palpated and Inspected  Head / Face Inspection Performed  Neck Inspection Performed  Chest / Axilla Inspection Performed  Abdomen Inspection Performed  Genitals / Buttocks / Groin Inspection Performed  Back Inspection Performed  RUE Inspected  LUE Inspection Performed  RLE Inspected  LLE Inspection Performed  Nails and Digits Inspection Performed                     Diagram Legend     Erythematous scaling macule/papule c/w actinic keratosis       Vascular papule c/w angioma      Pigmented verrucoid papule/plaque c/w seborrheic keratosis      Yellow umbilicated papule c/w sebaceous hyperplasia      Irregularly shaped tan macule c/w lentigo     1-2 mm smooth white papules consistent with Milia      Movable subcutaneous cyst with punctum c/w epidermal inclusion cyst      Subcutaneous movable cyst c/w pilar cyst      Firm pink to brown papule c/w dermatofibroma      Pedunculated fleshy papule(s) c/w skin tag(s)      Evenly pigmented macule c/w junctional nevus      Mildly variegated pigmented, slightly irregular-bordered macule c/w mildly atypical nevus      Flesh colored to evenly pigmented papule c/w intradermal nevus       Pink pearly papule/plaque c/w basal cell carcinoma      Erythematous hyperkeratotic cursted plaque c/w SCC      Surgical scar with no sign of skin cancer recurrence      Open and closed comedones      Inflammatory papules and pustules      Verrucoid papule consistent consistent with wart     Erythematous eczematous patches and plaques     Dystrophic onycholytic nail with subungual debris c/w onychomycosis     Umbilicated papule    Erythematous-base heme-crusted tan verrucoid plaque consistent with inflamed seborrheic keratosis     Erythematous Silvery Scaling Plaque c/w Psoriasis     See annotation      Assessment / Plan:        Lentigines  Reassurance given.  Discussed ABCDEF of melanoma and changes for patient to look for.  AAD Handout given. Discussed importance of daily use of sunscreen which is broad-spectrum and has a minimum SPF of 30.    Seborrheic keratosis  Reassurance given. Discussed diagnosis and that lesions are benign.  AAD handout given.     Acrochordon  Reassurance given.  Lesions are benign.           Follow up in about 1 year (around 9/19/2023).

## 2022-10-03 ENCOUNTER — PATIENT MESSAGE (OUTPATIENT)
Dept: ADMINISTRATIVE | Facility: HOSPITAL | Age: 52
End: 2022-10-03
Payer: COMMERCIAL

## 2022-11-09 ENCOUNTER — OFFICE VISIT (OUTPATIENT)
Dept: INTERNAL MEDICINE | Facility: CLINIC | Age: 52
End: 2022-11-09
Payer: COMMERCIAL

## 2022-11-09 ENCOUNTER — LAB VISIT (OUTPATIENT)
Dept: LAB | Facility: HOSPITAL | Age: 52
End: 2022-11-09
Attending: INTERNAL MEDICINE
Payer: COMMERCIAL

## 2022-11-09 VITALS
HEART RATE: 55 BPM | SYSTOLIC BLOOD PRESSURE: 134 MMHG | WEIGHT: 177.69 LBS | HEIGHT: 62 IN | DIASTOLIC BLOOD PRESSURE: 76 MMHG | BODY MASS INDEX: 32.7 KG/M2 | OXYGEN SATURATION: 99 %

## 2022-11-09 DIAGNOSIS — E66.9 OBESITY (BMI 30-39.9): ICD-10-CM

## 2022-11-09 DIAGNOSIS — Z00.00 ANNUAL PHYSICAL EXAM: ICD-10-CM

## 2022-11-09 DIAGNOSIS — Z12.31 SCREENING MAMMOGRAM FOR BREAST CANCER: ICD-10-CM

## 2022-11-09 DIAGNOSIS — Z00.00 ANNUAL PHYSICAL EXAM: Primary | ICD-10-CM

## 2022-11-09 LAB
ALBUMIN SERPL BCP-MCNC: 4.1 G/DL (ref 3.5–5.2)
ALP SERPL-CCNC: 55 U/L (ref 55–135)
ALT SERPL W/O P-5'-P-CCNC: 14 U/L (ref 10–44)
ANION GAP SERPL CALC-SCNC: 9 MMOL/L (ref 8–16)
AST SERPL-CCNC: 15 U/L (ref 10–40)
BASOPHILS # BLD AUTO: 0.03 K/UL (ref 0–0.2)
BASOPHILS NFR BLD: 0.4 % (ref 0–1.9)
BILIRUB SERPL-MCNC: 0.6 MG/DL (ref 0.1–1)
BUN SERPL-MCNC: 11 MG/DL (ref 6–20)
CALCIUM SERPL-MCNC: 9.4 MG/DL (ref 8.7–10.5)
CHLORIDE SERPL-SCNC: 104 MMOL/L (ref 95–110)
CHOLEST SERPL-MCNC: 213 MG/DL (ref 120–199)
CHOLEST/HDLC SERPL: 2.7 {RATIO} (ref 2–5)
CO2 SERPL-SCNC: 23 MMOL/L (ref 23–29)
CREAT SERPL-MCNC: 0.8 MG/DL (ref 0.5–1.4)
DIFFERENTIAL METHOD: NORMAL
EOSINOPHIL # BLD AUTO: 0.1 K/UL (ref 0–0.5)
EOSINOPHIL NFR BLD: 1.7 % (ref 0–8)
ERYTHROCYTE [DISTWIDTH] IN BLOOD BY AUTOMATED COUNT: 11.9 % (ref 11.5–14.5)
EST. GFR  (NO RACE VARIABLE): >60 ML/MIN/1.73 M^2
ESTIMATED AVG GLUCOSE: 91 MG/DL (ref 68–131)
FSH SERPL-ACNC: 11.04 MIU/ML
GLUCOSE SERPL-MCNC: 76 MG/DL (ref 70–110)
HBA1C MFR BLD: 4.8 % (ref 4–5.6)
HCT VFR BLD AUTO: 41 % (ref 37–48.5)
HDLC SERPL-MCNC: 78 MG/DL (ref 40–75)
HDLC SERPL: 36.6 % (ref 20–50)
HGB BLD-MCNC: 13.4 G/DL (ref 12–16)
IMM GRANULOCYTES # BLD AUTO: 0.02 K/UL (ref 0–0.04)
IMM GRANULOCYTES NFR BLD AUTO: 0.2 % (ref 0–0.5)
LDLC SERPL CALC-MCNC: 113.8 MG/DL (ref 63–159)
LYMPHOCYTES # BLD AUTO: 1.9 K/UL (ref 1–4.8)
LYMPHOCYTES NFR BLD: 23.3 % (ref 18–48)
MCH RBC QN AUTO: 30.2 PG (ref 27–31)
MCHC RBC AUTO-ENTMCNC: 32.7 G/DL (ref 32–36)
MCV RBC AUTO: 92 FL (ref 82–98)
MONOCYTES # BLD AUTO: 0.6 K/UL (ref 0.3–1)
MONOCYTES NFR BLD: 7.1 % (ref 4–15)
NEUTROPHILS # BLD AUTO: 5.6 K/UL (ref 1.8–7.7)
NEUTROPHILS NFR BLD: 67.3 % (ref 38–73)
NONHDLC SERPL-MCNC: 135 MG/DL
NRBC BLD-RTO: 0 /100 WBC
PLATELET # BLD AUTO: 321 K/UL (ref 150–450)
PMV BLD AUTO: 11.5 FL (ref 9.2–12.9)
POTASSIUM SERPL-SCNC: 3.9 MMOL/L (ref 3.5–5.1)
PROT SERPL-MCNC: 7.1 G/DL (ref 6–8.4)
RBC # BLD AUTO: 4.44 M/UL (ref 4–5.4)
SODIUM SERPL-SCNC: 136 MMOL/L (ref 136–145)
TRIGL SERPL-MCNC: 106 MG/DL (ref 30–150)
TSH SERPL DL<=0.005 MIU/L-ACNC: 1.15 UIU/ML (ref 0.4–4)
WBC # BLD AUTO: 8.33 K/UL (ref 3.9–12.7)

## 2022-11-09 PROCEDURE — 83036 HEMOGLOBIN GLYCOSYLATED A1C: CPT | Performed by: INTERNAL MEDICINE

## 2022-11-09 PROCEDURE — 36415 COLL VENOUS BLD VENIPUNCTURE: CPT | Mod: PO | Performed by: INTERNAL MEDICINE

## 2022-11-09 PROCEDURE — 83001 ASSAY OF GONADOTROPIN (FSH): CPT | Performed by: INTERNAL MEDICINE

## 2022-11-09 PROCEDURE — 99396 PREV VISIT EST AGE 40-64: CPT | Mod: S$GLB,,, | Performed by: INTERNAL MEDICINE

## 2022-11-09 PROCEDURE — 99999 PR PBB SHADOW E&M-EST. PATIENT-LVL III: ICD-10-PCS | Mod: PBBFAC,,, | Performed by: INTERNAL MEDICINE

## 2022-11-09 PROCEDURE — 3078F PR MOST RECENT DIASTOLIC BLOOD PRESSURE < 80 MM HG: ICD-10-PCS | Mod: CPTII,S$GLB,, | Performed by: INTERNAL MEDICINE

## 2022-11-09 PROCEDURE — 80053 COMPREHEN METABOLIC PANEL: CPT | Performed by: INTERNAL MEDICINE

## 2022-11-09 PROCEDURE — 3008F BODY MASS INDEX DOCD: CPT | Mod: CPTII,S$GLB,, | Performed by: INTERNAL MEDICINE

## 2022-11-09 PROCEDURE — 3008F PR BODY MASS INDEX (BMI) DOCUMENTED: ICD-10-PCS | Mod: CPTII,S$GLB,, | Performed by: INTERNAL MEDICINE

## 2022-11-09 PROCEDURE — 1159F PR MEDICATION LIST DOCUMENTED IN MEDICAL RECORD: ICD-10-PCS | Mod: CPTII,S$GLB,, | Performed by: INTERNAL MEDICINE

## 2022-11-09 PROCEDURE — 3075F PR MOST RECENT SYSTOLIC BLOOD PRESS GE 130-139MM HG: ICD-10-PCS | Mod: CPTII,S$GLB,, | Performed by: INTERNAL MEDICINE

## 2022-11-09 PROCEDURE — 1160F RVW MEDS BY RX/DR IN RCRD: CPT | Mod: CPTII,S$GLB,, | Performed by: INTERNAL MEDICINE

## 2022-11-09 PROCEDURE — 3075F SYST BP GE 130 - 139MM HG: CPT | Mod: CPTII,S$GLB,, | Performed by: INTERNAL MEDICINE

## 2022-11-09 PROCEDURE — 80061 LIPID PANEL: CPT | Performed by: INTERNAL MEDICINE

## 2022-11-09 PROCEDURE — 1160F PR REVIEW ALL MEDS BY PRESCRIBER/CLIN PHARMACIST DOCUMENTED: ICD-10-PCS | Mod: CPTII,S$GLB,, | Performed by: INTERNAL MEDICINE

## 2022-11-09 PROCEDURE — 84443 ASSAY THYROID STIM HORMONE: CPT | Performed by: INTERNAL MEDICINE

## 2022-11-09 PROCEDURE — 99396 PR PREVENTIVE VISIT,EST,40-64: ICD-10-PCS | Mod: S$GLB,,, | Performed by: INTERNAL MEDICINE

## 2022-11-09 PROCEDURE — 99999 PR PBB SHADOW E&M-EST. PATIENT-LVL III: CPT | Mod: PBBFAC,,, | Performed by: INTERNAL MEDICINE

## 2022-11-09 PROCEDURE — 85025 COMPLETE CBC W/AUTO DIFF WBC: CPT | Performed by: INTERNAL MEDICINE

## 2022-11-09 PROCEDURE — 1159F MED LIST DOCD IN RCRD: CPT | Mod: CPTII,S$GLB,, | Performed by: INTERNAL MEDICINE

## 2022-11-09 PROCEDURE — 3078F DIAST BP <80 MM HG: CPT | Mod: CPTII,S$GLB,, | Performed by: INTERNAL MEDICINE

## 2022-11-09 NOTE — PROGRESS NOTES
Patient ID: Nisha Mei is a 51 y.o. female.    Chief Complaint: Annual Exam    HPI Nisha is a 51 y.o. female with history of endometrial ablation who presents for annual exam. Wondering if she might be in menopause. Will feel achy when she ovulates. No menstrual cycle since ablation. No hot flashes or night sweats. No new acute complaints today.    Health Maintenance Topics with due status: Not Due       Topic Last Completion Date    TETANUS VACCINE 10/03/2018    Cervical Cancer Screening 11/09/2021    Lipid Panel 11/11/2021      Review of Systems   All other systems reviewed and are negative.    Objective:     Vitals:    11/09/22 1354   BP: 134/76   Pulse: (!) 55        Physical Exam  Vitals reviewed.   Constitutional:       General: She is not in acute distress.     Appearance: Normal appearance. She is well-developed. She is obese. She is not ill-appearing, toxic-appearing or diaphoretic.   HENT:      Head: Normocephalic and atraumatic.      Right Ear: Tympanic membrane, ear canal and external ear normal. There is no impacted cerumen.      Left Ear: Tympanic membrane, ear canal and external ear normal. There is no impacted cerumen.      Nose: Nose normal.   Eyes:      General: No scleral icterus.        Right eye: No discharge.         Left eye: No discharge.      Extraocular Movements: Extraocular movements intact.      Conjunctiva/sclera: Conjunctivae normal.   Neck:      Thyroid: No thyromegaly.      Trachea: No tracheal deviation.   Cardiovascular:      Rate and Rhythm: Normal rate and regular rhythm.      Pulses: Normal pulses.      Heart sounds: Normal heart sounds. No murmur heard.    No friction rub. No gallop.   Pulmonary:      Effort: Pulmonary effort is normal. No respiratory distress.      Breath sounds: Normal breath sounds. No stridor. No wheezing, rhonchi or rales.   Chest:      Chest wall: No tenderness.   Abdominal:      General: Bowel sounds are normal. There is no distension.       Palpations: Abdomen is soft. There is no mass.      Tenderness: There is no abdominal tenderness. There is no guarding or rebound.      Hernia: No hernia is present.   Musculoskeletal:         General: No tenderness or deformity.      Cervical back: Neck supple.      Right lower leg: No edema.      Left lower leg: No edema.   Lymphadenopathy:      Cervical: No cervical adenopathy.   Skin:     General: Skin is warm and dry.      Findings: No erythema or rash.   Neurological:      General: No focal deficit present.      Mental Status: She is alert and oriented to person, place, and time. Mental status is at baseline.   Psychiatric:         Mood and Affect: Mood normal.         Behavior: Behavior normal.         Thought Content: Thought content normal.         Judgment: Judgment normal.       Assessment:       1. Annual physical exam    2. Screening mammogram for breast cancer    3. Obesity (BMI 30-39.9)          Plan:     Pt wondering if she may be in menopause. Will check FSH level. Since asymptomatic, would not treat. However if she develops symptoms, would discuss treatment options at that time. She understands and agrees with plan.     Annual physical exam  -     CBC Auto Differential; Future; Expected date: 11/09/2022  -     Comprehensive Metabolic Panel; Future; Expected date: 11/09/2022  -     Hemoglobin A1C; Future; Expected date: 11/09/2022  -     Lipid Panel; Future; Expected date: 11/09/2022  -     TSH; Future; Expected date: 11/09/2022  -     CBC Auto Differential; Future; Expected date: 11/01/2023  -     Comprehensive Metabolic Panel; Future; Expected date: 11/01/2023  -     Hemoglobin A1C; Future; Expected date: 11/01/2023  -     Lipid Panel; Future; Expected date: 11/01/2023  -     TSH; Future; Expected date: 11/01/2023  -     FOLLICLE STIMULATING HORMONE; Future; Expected date: 11/09/2022    Screening mammogram for breast cancer  -     Mammo Digital Screening Bilat; Future; Expected date:  11/09/2022    Obesity (BMI 30-39.9)      RTC 1 year and PRN    Warning signs discussed, patient to call with any further issues or worsening of symptoms.       Parts of the above note were dictated using a voice dictation software. Please excuse any grammatical or typographical errors.

## 2022-12-21 ENCOUNTER — HOSPITAL ENCOUNTER (OUTPATIENT)
Dept: RADIOLOGY | Facility: HOSPITAL | Age: 52
Discharge: HOME | End: 2022-12-21
Attending: INTERNAL MEDICINE
Payer: COMMERCIAL

## 2022-12-21 DIAGNOSIS — Z12.31 SCREENING MAMMOGRAM FOR BREAST CANCER: ICD-10-CM

## 2022-12-21 PROCEDURE — 77067 MAMMO DIGITAL SCREENING BILAT WITH TOMO: ICD-10-PCS | Mod: 26,,, | Performed by: RADIOLOGY

## 2022-12-21 PROCEDURE — 77067 SCR MAMMO BI INCL CAD: CPT | Mod: 26,,, | Performed by: RADIOLOGY

## 2022-12-21 PROCEDURE — 77063 BREAST TOMOSYNTHESIS BI: CPT | Mod: TC

## 2022-12-21 PROCEDURE — 77063 MAMMO DIGITAL SCREENING BILAT WITH TOMO: ICD-10-PCS | Mod: 26,,, | Performed by: RADIOLOGY

## 2022-12-21 PROCEDURE — 77063 BREAST TOMOSYNTHESIS BI: CPT | Mod: 26,,, | Performed by: RADIOLOGY

## 2023-01-04 ENCOUNTER — TELEPHONE (OUTPATIENT)
Dept: RADIOLOGY | Facility: HOSPITAL | Age: 53
End: 2023-01-04
Payer: COMMERCIAL

## 2023-01-04 NOTE — TELEPHONE ENCOUNTER
----- Message from Mark Ashraf MA sent at 1/4/2023  2:24 PM CST -----  Regarding: sooner appt  Contact: pt   Pt is  calling  to see if there are  any sooner appts before January 17th . She  stated that  she already something for the 17th  please call pt

## 2023-01-05 ENCOUNTER — PATIENT MESSAGE (OUTPATIENT)
Dept: DERMATOLOGY | Facility: CLINIC | Age: 53
End: 2023-01-05
Payer: COMMERCIAL

## 2023-01-17 ENCOUNTER — HOSPITAL ENCOUNTER (OUTPATIENT)
Dept: RADIOLOGY | Facility: HOSPITAL | Age: 53
Discharge: HOME | End: 2023-01-17
Attending: INTERNAL MEDICINE
Payer: COMMERCIAL

## 2023-01-17 DIAGNOSIS — R92.8 ABNORMAL MAMMOGRAM: ICD-10-CM

## 2023-01-17 PROCEDURE — 77061 BREAST TOMOSYNTHESIS UNI: CPT | Mod: 26,RT,, | Performed by: RADIOLOGY

## 2023-01-17 PROCEDURE — 77061 MAMMO DIGITAL DIAGNOSTIC RIGHT WITH TOMO: ICD-10-PCS | Mod: 26,RT,, | Performed by: RADIOLOGY

## 2023-01-17 PROCEDURE — 77065 MAMMO DIGITAL DIAGNOSTIC RIGHT WITH TOMO: ICD-10-PCS | Mod: 26,RT,, | Performed by: RADIOLOGY

## 2023-01-17 PROCEDURE — 77065 DX MAMMO INCL CAD UNI: CPT | Mod: TC,RT

## 2023-01-17 PROCEDURE — 77065 DX MAMMO INCL CAD UNI: CPT | Mod: 26,RT,, | Performed by: RADIOLOGY

## 2023-01-20 ENCOUNTER — TELEPHONE (OUTPATIENT)
Dept: INTERNAL MEDICINE | Facility: CLINIC | Age: 53
End: 2023-01-20
Payer: COMMERCIAL

## 2023-01-20 NOTE — TELEPHONE ENCOUNTER
Called patient to inform them that mammogram results were normal, but there was no answer. I left a message asking them to call us back to receive the results.

## 2023-01-30 ENCOUNTER — PATIENT MESSAGE (OUTPATIENT)
Dept: INTERNAL MEDICINE | Facility: CLINIC | Age: 53
End: 2023-01-30
Payer: COMMERCIAL

## 2023-01-30 ENCOUNTER — OFFICE VISIT (OUTPATIENT)
Dept: DERMATOLOGY | Facility: CLINIC | Age: 53
End: 2023-01-30
Payer: COMMERCIAL

## 2023-01-30 DIAGNOSIS — R20.9 SKIN SENSATION DISTURBANCE: ICD-10-CM

## 2023-01-30 DIAGNOSIS — Z12.83 SKIN CANCER SCREENING: Primary | ICD-10-CM

## 2023-01-30 DIAGNOSIS — L82.1 SK (SEBORRHEIC KERATOSIS): ICD-10-CM

## 2023-01-30 DIAGNOSIS — D22.9 MULTIPLE BENIGN NEVI: ICD-10-CM

## 2023-01-30 DIAGNOSIS — D18.01 CHERRY ANGIOMA: ICD-10-CM

## 2023-01-30 DIAGNOSIS — L85.3 XEROSIS CUTIS: ICD-10-CM

## 2023-01-30 DIAGNOSIS — L81.4 LENTIGINES: ICD-10-CM

## 2023-01-30 DIAGNOSIS — L82.0 INFLAMED SEBORRHEIC KERATOSIS: ICD-10-CM

## 2023-01-30 PROCEDURE — 99999 PR PBB SHADOW E&M-EST. PATIENT-LVL II: ICD-10-PCS | Mod: PBBFAC,,, | Performed by: DERMATOLOGY

## 2023-01-30 PROCEDURE — 87220 PR  TISSUE EXAM BY KOH: ICD-10-PCS | Mod: 59,S$GLB,, | Performed by: DERMATOLOGY

## 2023-01-30 PROCEDURE — 99999 PR PBB SHADOW E&M-EST. PATIENT-LVL II: CPT | Mod: PBBFAC,,, | Performed by: DERMATOLOGY

## 2023-01-30 PROCEDURE — 87220 TISSUE EXAM FOR FUNGI: CPT | Mod: 59,S$GLB,, | Performed by: DERMATOLOGY

## 2023-01-30 PROCEDURE — 1159F PR MEDICATION LIST DOCUMENTED IN MEDICAL RECORD: ICD-10-PCS | Mod: CPTII,S$GLB,, | Performed by: DERMATOLOGY

## 2023-01-30 PROCEDURE — 99213 OFFICE O/P EST LOW 20 MIN: CPT | Mod: 25,S$GLB,, | Performed by: DERMATOLOGY

## 2023-01-30 PROCEDURE — 17110 PR DESTRUCTION BENIGN LESIONS UP TO 14: ICD-10-PCS | Mod: S$GLB,,, | Performed by: DERMATOLOGY

## 2023-01-30 PROCEDURE — 99213 PR OFFICE/OUTPT VISIT, EST, LEVL III, 20-29 MIN: ICD-10-PCS | Mod: 25,S$GLB,, | Performed by: DERMATOLOGY

## 2023-01-30 PROCEDURE — 17110 DESTRUCTION B9 LES UP TO 14: CPT | Mod: S$GLB,,, | Performed by: DERMATOLOGY

## 2023-01-30 PROCEDURE — 1159F MED LIST DOCD IN RCRD: CPT | Mod: CPTII,S$GLB,, | Performed by: DERMATOLOGY

## 2023-01-30 NOTE — PATIENT INSTRUCTIONS
Sun Protection      The [facility] would like to remind you of the importance of sun protection all year round and particularly during the summer when the suns rays are the strongest. It has been proven that both acute and chronic sun exposure damages our cells and leads to skin cancer. Beyond skin cancer, the sun causes 90% of the symptoms of premature skin aging, including wrinkles, lentigines (brown spots), and thin, easily bruised skin. Proper sun protection can help prevent these unwanted conditions.    Many patients report that they dont go in the sun. It has been shown that the average person receives 18 hours of incidental sun exposure per week during activities such as walking through parking lots, driving, or sitting next to windows. This accumulates to several bad sunburns per year!    In choosing sunscreen, you want one that protects against both UVA and UVB rays (broad spectrum). It is recommended that you use one of SPF 30 or higher. It is important to apply the sunscreen about 20 minutes prior to sun exposure. Most sunscreens are chemical sunscreens and a reaction must take place in the skin so that they are effective. If they are applied and then you are immediately exposed to the sun or start sweating, this reaction has not had time to take place and you are therefore unprotected. Sunscreen needs to be reapplied every 2 hours if you are participating in water sports or sweating. We recommend Elta MD or CeraVe sunscreens for daily use; however there are many options and it is most important for you to find one that you will use on a consistent basis.    If you have sensitive skin, you may do best with a sunscreen that contains only physical blockers in the active ingredient section. The only physical blockers available in the [place] currently are titanium dioxide or zinc oxide. These are typically thicker and harder to apply, however they afford very good protection.  Neutrogena Sensitive Skin, Blue Lizard Sensitive Skin (pink top) or Neutrogena Pure and Free are popular ones.     Aside from sunscreen, clothes with UV protection (UPF), wide brimmed hats, and sunglasses are other means of sun protection that we recommend.      Based on a recent study (6/2021) and out of an abundance of caution, we are recommending that you AVOID the following sunscreens as they may contain the carcinogen, benzene:    Spray and gel sunscreens  Any [facility] or [facility] brands as well as Max Block and TopCare brands   Neutrogena Ultra Sheer Dry-touch Water Resistant Sunscreen LOTION SPF 70   Neutrogena Sheer Zinc Dry-touch Face Sunscreen LOTION SPF 50   5.   Aveeno Baby Continuous Protection Sensitive Skin Sunscreen LOTION - Broad Spectrum SPF 50    Please note that Benzene is not an ingredient or the degradation product of any ingredient in any sunscreen. This study suggested that the findings are a result of contamination in the manufacturing process. At this point, we don't know how effectively Benzene gets through the skin, if it gets absorbed systemically, and what effects it may have.     We do know that ultraviolet radiation is a well-established carcinogen. Please use daily sun protection/avoidance and use of at least SPF 30, broad-spectrum sunscreen not listed above.                       [facility] - DERMATOLOGY [address]  [address]  [place] [address]  Dept: [phone]  Dept Fax: [phone]                                                                                CRYOSURGERY      Your doctor has used a method called cryosurgery to treat your skin condition. Cryosurgery refers to the use of very cold substances to treat a variety of skin conditions such as warts, pre-skin cancers, molluscum contagiosum, sun spots, and several benign growths. The substance we use in cryosurgery is liquid nitrogen and is so cold (-195 degrees Celsius) that is burns  when administered.     Following treatment in the office, the skin may immediately burn and become red. You may find the area around the lesion is affected as well. It is sometimes necessary to treat not only the lesion, but a small area of the surrounding normal skin to achieve a good response.     A blister, and even a blood filled blister, may form after treatment.   This is a normal response. If the blister is painful, it is acceptable to sterilize a needle and with rubbing alcohol and gently pop the blister. It is important that you gently wash the area with soap and warm water as the blister fluid may contain wart virus if a wart was treated. Do no remove the roof of the blister.     The area treated can take anywhere from 1-3 weeks to heal. Healing time depends on the kind skin lesion treated, the location, and how aggressively the lesion was treated. It is recommended that the areas treated are covered with Vaseline or bacitracin ointment and a band-aid. If a band-aid is not practical, just ointment applied several times per day will do. Keeping these areas moist will speed the healing time.    Treatment with liquid nitrogen can leave a scar. In dark skin, it may be a light or dark scar, in light skin it may be a white or pink scar. These will generally fade with time, but may never go away completely.     If you have any concerns after your treatment, please feel free to call the office.       [address] [place] [address]/ [phone] FAX/ [email or web address]     XEROSIS (DRY SKIN)        Definition    Xerosis is the term for dry skin.  We all have a natural oil coating over our skin produced by the skin oil glands.  If this oil is removed, the skin becomes dry which can lead to cracking, which can lead to inflammation.  Xerosis is usually a long-term problem that recurs often, especially in the winter.    Cause    Long hot baths or showers can remove our natural oil and lead to  xerosis.  One should never take more than one bath or shower a day and for no longer than ten minutes.  Use of harsh soaps such as Zest, Dial, and Ivory can worsen and cause xerosis.  Cold winter weather worsens xerosis because the amount of moisture contained in cold air is much less than the amount of moisture in warm air.    Treatment    Treatment is intended to restore the natural oil to your skin.  Keep the skin lubricated.    Do not take more than one bath or shower a day.  Use lukewarm water, not hot.  Hot water dries out the skin.    Use a gentle moisturizing soap such as Cetaphil soap, Oil of Olay, Dove, Basis, Ivory moisture care, Restoraderm cleanser.    When toweling dry, dont rub.  Blot the skin so there is still some water left on the skin.  You should apply a moisturizing cream to all of the skin such as Cerave cream, Cetaphil cream, Lipikar Iola AP+ Intense Repair Moisturizing Cream or Restoraderm or Eucerin Original Formula cream.   Alpha hydroxyacid lotions, i.e., AmLactin, also work very well for preventing dry skin, but may burn when used on inflamed or reddened skin.    If you like to swim during the winter months, you should not use soap when getting out of the pool.  When you have finished swimming, rinse off the chlorine with cool to warm water.  If this will be the only shower of the day, then you may use Cetaphil or another mild soap to cleanse your skin.  After the shower, apply a moisturizing cream to all of the skin as above.        [address] [place] [address]/ [phone] FAX/ [email or web address]

## 2023-01-30 NOTE — PROGRESS NOTES
Subjective:       Patient ID:  Nisha Mei is a 52 y.o. female who presents for   Chief Complaint   Patient presents with    Skin Check     UBSE    Lesion     Underneath L eye     Lesion    Pt here today for UBSE.    She also has a lesion on her L upper cheek which got big and then fell off. It was located at a site which was previously biopsied.    Final Pathologic Diagnosis 1.  Skin, left lower cheek, punch biopsy:   -ACTINIC KERATOSIS (PIGMENTED)   2.  Skin, left upper cheek, punch biopsy:   -SEBORRHEIC KERATOSIS, THIN AND PIGMENTED     Bx-proven AK above was treated with Picato.    Also c/o a very dry, itchy back, on and off x yrs. Tx with lotion, no improvement.  Has a thick, irritated growth on R breast.    Review of Systems   Constitutional:  Negative for fever and chills.   Skin:  Negative for itching and rash.      Objective:    Physical Exam   Constitutional: She appears well-developed and well-nourished. No distress.   Neurological: She is alert and oriented to person, place, and time. She is not disoriented.   Psychiatric: She has a normal mood and affect.   Skin:   Areas Examined (abnormalities noted in diagram):   Scalp / Hair Palpated and Inspected  Head / Face Inspection Performed  Neck Inspection Performed  Chest / Axilla Inspection Performed  Abdomen Inspection Performed  Back Inspection Performed  RUE Inspected  LUE Inspection Performed  Nails and Digits Inspection Performed                     Diagram Legend     Erythematous scaling macule/papule c/w actinic keratosis       Vascular papule c/w angioma      Pigmented verrucoid papule/plaque c/w seborrheic keratosis      Yellow umbilicated papule c/w sebaceous hyperplasia      Irregularly shaped tan macule c/w lentigo     1-2 mm smooth white papules consistent with Milia      Movable subcutaneous cyst with punctum c/w epidermal inclusion cyst      Subcutaneous movable cyst c/w pilar cyst      Firm pink to brown papule c/w dermatofibroma       Pedunculated fleshy papule(s) c/w skin tag(s)      Evenly pigmented macule c/w junctional nevus     Mildly variegated pigmented, slightly irregular-bordered macule c/w mildly atypical nevus      Flesh colored to evenly pigmented papule c/w intradermal nevus       Pink pearly papule/plaque c/w basal cell carcinoma      Erythematous hyperkeratotic cursted plaque c/w SCC      Surgical scar with no sign of skin cancer recurrence      Open and closed comedones      Inflammatory papules and pustules      Verrucoid papule consistent consistent with wart     Erythematous eczematous patches and plaques     Dystrophic onycholytic nail with subungual debris c/w onychomycosis     Umbilicated papule    Erythematous-base heme-crusted tan verrucoid plaque consistent with inflamed seborrheic keratosis     Erythematous Silvery Scaling Plaque c/w Psoriasis     See annotation      Assessment / Plan:        Skin cancer screening  Upper body skin examination performed today including at least 6 points as noted in physical examination. No lesions suspicious for malignancy noted.  Patient instructed in importance of daily broad spectrum sunscreen use with spf at least 30. Sun avoidance and topical protection/protective clothing discussed.    Multiple benign nevi  Benign-appearing nevi present on exam today. Reassurance provided. Periodically examine moles and return to clinic if any moles change or become symptomatic (bleeding, itching, pain, etc).    SK (seborrheic keratosis)  These are benign inherited growths without a malignant potential. Reassurance given to patient. No treatment is necessary.   Treatment of benign, asymptomatic lesions may be considered cosmetic.  Warned about risk of hypo- or hyperpigmentation with treatment and risk of recurrence.    Cherry angioma  This is a benign vascular lesion. Reassurance given. No treatment required. Treatment of benign, asymptomatic lesions may be considered  "cosmetic.    Lentigines  These are benign sun spots which should be monitored for changes. Patient instructed in importance of daily broad spectrum sunscreen use with spf at least 30. Sun avoidance and topical protection/protective clothing discussed.    Inflamed seborrheic keratosis  Skin sensation disturbance  Cryosurgery procedure note:  Verbal consent from the patient is obtained including, but not limited to, risk of hypopigmentation/hyperpigmentation, scar, recurrence of lesion. Liquid nitrogen cryosurgery is applied to 1 lesions to produce a freeze injury. The patient is aware that blisters may form and is instructed on wound care with gentle cleansing and use of vaseline ointment to keep moist until healed. The patient is supplied a handout on cryosurgery and is instructed to call if lesions do not completely resolve.    Xerosis cutis  Good skin care regimen discussed including limiting to one bath or shower/day, using lukewarm water with mild soap and moisturizing cream to skin 1 - 2x/day. Brochure was provided and reviewed with patient.  Recommended CeraVe moisturizing cream, Dove sensitive skin soap, and "free and clear" detergents.      Follow up in about 1 year (around 1/30/2024) for skin check or sooner for any concerns.    "

## 2023-01-31 DIAGNOSIS — R59.1 LYMPHADENOPATHY: Primary | ICD-10-CM

## 2023-02-14 ENCOUNTER — OFFICE VISIT (OUTPATIENT)
Dept: SURGERY | Facility: CLINIC | Age: 53
End: 2023-02-14
Payer: COMMERCIAL

## 2023-02-14 VITALS
SYSTOLIC BLOOD PRESSURE: 128 MMHG | BODY MASS INDEX: 33.68 KG/M2 | WEIGHT: 183 LBS | DIASTOLIC BLOOD PRESSURE: 66 MMHG | HEART RATE: 70 BPM | HEIGHT: 62 IN

## 2023-02-14 DIAGNOSIS — R59.1 LYMPHADENOPATHY: ICD-10-CM

## 2023-02-14 DIAGNOSIS — Z12.39 SCREENING BREAST EXAMINATION: ICD-10-CM

## 2023-02-14 DIAGNOSIS — R92.8 ABNORMAL MAMMOGRAM: Primary | ICD-10-CM

## 2023-02-14 PROCEDURE — 99999 PR PBB SHADOW E&M-EST. PATIENT-LVL III: ICD-10-PCS | Mod: PBBFAC,,, | Performed by: PHYSICIAN ASSISTANT

## 2023-02-14 PROCEDURE — 3074F PR MOST RECENT SYSTOLIC BLOOD PRESSURE < 130 MM HG: ICD-10-PCS | Mod: CPTII,S$GLB,, | Performed by: PHYSICIAN ASSISTANT

## 2023-02-14 PROCEDURE — 3078F DIAST BP <80 MM HG: CPT | Mod: CPTII,S$GLB,, | Performed by: PHYSICIAN ASSISTANT

## 2023-02-14 PROCEDURE — 3008F BODY MASS INDEX DOCD: CPT | Mod: CPTII,S$GLB,, | Performed by: PHYSICIAN ASSISTANT

## 2023-02-14 PROCEDURE — 1160F RVW MEDS BY RX/DR IN RCRD: CPT | Mod: CPTII,S$GLB,, | Performed by: PHYSICIAN ASSISTANT

## 2023-02-14 PROCEDURE — 3074F SYST BP LT 130 MM HG: CPT | Mod: CPTII,S$GLB,, | Performed by: PHYSICIAN ASSISTANT

## 2023-02-14 PROCEDURE — 99203 OFFICE O/P NEW LOW 30 MIN: CPT | Mod: S$GLB,,, | Performed by: PHYSICIAN ASSISTANT

## 2023-02-14 PROCEDURE — 3008F PR BODY MASS INDEX (BMI) DOCUMENTED: ICD-10-PCS | Mod: CPTII,S$GLB,, | Performed by: PHYSICIAN ASSISTANT

## 2023-02-14 PROCEDURE — 99203 PR OFFICE/OUTPT VISIT, NEW, LEVL III, 30-44 MIN: ICD-10-PCS | Mod: S$GLB,,, | Performed by: PHYSICIAN ASSISTANT

## 2023-02-14 PROCEDURE — 99999 PR PBB SHADOW E&M-EST. PATIENT-LVL III: CPT | Mod: PBBFAC,,, | Performed by: PHYSICIAN ASSISTANT

## 2023-02-14 PROCEDURE — 1160F PR REVIEW ALL MEDS BY PRESCRIBER/CLIN PHARMACIST DOCUMENTED: ICD-10-PCS | Mod: CPTII,S$GLB,, | Performed by: PHYSICIAN ASSISTANT

## 2023-02-14 PROCEDURE — 1159F MED LIST DOCD IN RCRD: CPT | Mod: CPTII,S$GLB,, | Performed by: PHYSICIAN ASSISTANT

## 2023-02-14 PROCEDURE — 3078F PR MOST RECENT DIASTOLIC BLOOD PRESSURE < 80 MM HG: ICD-10-PCS | Mod: CPTII,S$GLB,, | Performed by: PHYSICIAN ASSISTANT

## 2023-02-14 PROCEDURE — 1159F PR MEDICATION LIST DOCUMENTED IN MEDICAL RECORD: ICD-10-PCS | Mod: CPTII,S$GLB,, | Performed by: PHYSICIAN ASSISTANT

## 2023-03-14 NOTE — PROGRESS NOTES
"[facility]  Department of Surgery    REFERRING PROVIDER: Alisa Thomas Md  [address]  [place] [address]   CHIEF COMPLAINT: "swollen node" on mammogram    Subjective:      Nisha Mei is a 52 y.o. perimenopausal female referred for evaluation of new axilla changes. Patient states she underwent screening mammogram on 22 which noted a focal asymmetry in her upper outer right breast. Further work up with diagnostic mammo/US recommended. Diagnostic mammogram performed on 23. With spot compression, the asymmetry disappears. No evidence of suspicious masses, calcifications, or other abnormal findings in the right breast. Patient does routinely do self breast exams.  Patient has not noted a change on breast exam.  Patient denies nipple discharge. Patient denies to previous breast biopsy. Patient denies a personal history of breast cancer.    GYN History:  Age of menarche was 14.   Perimenopausal.   Patient denies hormonal therapy.   Patient is .   Age of first live birth was 25.    Patient did not breast feed.    FAMILY history:  Denies significant family history of breast or other cancer.     Past Medical History:   Diagnosis Date    Allergy     History of ovarian cyst      Past Surgical History:   Procedure Laterality Date    DILATION AND CURETTAGE OF UTERUS      x3    TUBAL LIGATION Bilateral 2008     No current outpatient medications on file prior to visit.     No current facility-administered medications on file prior to visit.     Social History     Socioeconomic History    Marital status:    Occupational History    Occupation: nurse   Tobacco Use    Smoking status: Never    Smokeless tobacco: Never   Substance and Sexual Activity    Alcohol use: Yes     Comment: rare    Drug use: No    Sexual activity: Yes     Partners: Male     Family History   Problem Relation Age of Onset    Hypertension Mother     Mitral valve prolapse Mother     Diabetes Mellitus " "Paternal Grandfather     Heart attack Maternal Aunt     Breast cancer Neg Hx     Colon cancer Neg Hx     Ovarian cancer Neg Hx     Melanoma Neg Hx        Review of Systems  Review of Systems   Constitutional:  Negative for chills, fever and malaise/fatigue.   HENT:  Negative for congestion.    Eyes:  Negative for discharge.   Respiratory:  Negative for cough, shortness of breath and stridor.    Cardiovascular:  Negative for chest pain and palpitations.   Gastrointestinal:  Negative for abdominal pain and nausea.   Neurological:  Negative for headaches.   Psychiatric/Behavioral:  The patient is not nervous/anxious.       Objective:        /66 (BP Location: Left arm, Patient Position: Sitting, BP Method: Medium (Automatic))   Pulse 70   Ht 5' 2" (1.575 m)   Wt 83 kg (183 lb)   BMI 33.47 kg/m²   Physical Exam   Vitals reviewed.  Constitutional: She is oriented to person, place, and time.   HENT:   Head: Normocephalic and atraumatic.   Nose: Nose normal.   Eyes: Pupils are equal, round, and reactive to light. Right eye exhibits no discharge. Left eye exhibits no discharge.   Pulmonary/Chest: Effort normal and breath sounds normal. No stridor. No respiratory distress. She exhibits no mass, no tenderness and no edema. Right breast exhibits no inverted nipple, no mass, no nipple discharge, no skin change and no tenderness. Left breast exhibits no inverted nipple, no mass, no nipple discharge, no skin change and no tenderness. No breast swelling or bleeding. Breasts are symmetrical.   Abdominal: Normal appearance.   Genitourinary: No breast swelling or bleeding.   Neurological: She is alert and oriented to person, place, and time.   Skin: Skin is warm and dry.     Psychiatric: Her behavior is normal. Mood, judgment and thought content normal.      Radiology review: Images personally reviewed by me in the clinic.    12/21/22 Bilateral Screening Mammo:    Findings:  This procedure was performed using tomosynthesis. " Computer-aided detection was utilized in the interpretation of this examination.  The breasts have scattered areas of fibroglandular density.      Right  There is a focal asymmetry seen in the upper outer quadrant of the right breast.      Left  There is no evidence of suspicious masses, calcifications, or other abnormal findings in the left breast.     Impression:  Right  Focal Asymmetry: Right breast focal asymmetry at the upper outer position. Assessment: 0 - Incomplete. Diagnostic Mammogram and/or Ultrasound is recommended.      Left  There is no mammographic evidence of malignancy in the left breast.     BI-RADS Category:   Overall: 0 - Incomplete: Needs Additional Imaging Evaluation        Recommendation:  Diagnostic mammogram with possible ultrasound (if indicated) is recommended.    1/17/23 Right Diagnostic Mammo:     Findings:  This procedure was performed using tomosynthesis. Computer-aided detection was utilized in the interpretation of this examination.  The right breast has scattered areas of fibroglandular density.     There is a focal asymmetry seen in the upper outer quadrant of the right breast. On spot compression views, the asymmetry resolves, consistent with benign breast tissue. No evidence of suspicious masses, calcifications, or other abnormal findings in the right breast.     Impression:  There is no mammographic evidence of malignancy in the right breast.     BI-RADS Category:   Overall: 2 - Benign        Recommendation:  Routine screening mammogram in 1 year is recommended      Assessment:      Nisha Mei is a 52 y.o. perimenopausal female with recent changes noted on screening mammogram, follow up diagnostic imaging and CBE negative.      Plan:   On examination, no LAD or palpable concern appreciated in patient's right breast. Patient reassured.     Explained that given resolution with spot compression with diagnostic work up, the asymmetry seen on screening mammogram was likely  a completely benign finding.     Patient denies focal pain or other concerning findings requiring further work up at this time.     Will see back PRN.     Patient verbalized understanding of plan. All questions asked and answered. Advised to call our office with any new or worsening sxs.

## 2023-04-11 ENCOUNTER — PATIENT MESSAGE (OUTPATIENT)
Dept: ADMINISTRATIVE | Facility: HOSPITAL | Age: 53
End: 2023-04-11
Payer: COMMERCIAL

## 2023-05-02 ENCOUNTER — OFFICE VISIT (OUTPATIENT)
Dept: SURGERY | Facility: CLINIC | Age: 53
End: 2023-05-02
Payer: COMMERCIAL

## 2023-05-02 VITALS
HEART RATE: 63 BPM | DIASTOLIC BLOOD PRESSURE: 70 MMHG | WEIGHT: 183 LBS | SYSTOLIC BLOOD PRESSURE: 130 MMHG | HEIGHT: 62 IN | BODY MASS INDEX: 33.68 KG/M2

## 2023-05-02 DIAGNOSIS — Z12.39 SCREENING BREAST EXAMINATION: Primary | ICD-10-CM

## 2023-05-02 DIAGNOSIS — Z12.31 SCREENING MAMMOGRAM, ENCOUNTER FOR: ICD-10-CM

## 2023-05-02 PROCEDURE — 3075F PR MOST RECENT SYSTOLIC BLOOD PRESS GE 130-139MM HG: ICD-10-PCS | Mod: CPTII,S$GLB,, | Performed by: PHYSICIAN ASSISTANT

## 2023-05-02 PROCEDURE — 3078F PR MOST RECENT DIASTOLIC BLOOD PRESSURE < 80 MM HG: ICD-10-PCS | Mod: CPTII,S$GLB,, | Performed by: PHYSICIAN ASSISTANT

## 2023-05-02 PROCEDURE — 1159F MED LIST DOCD IN RCRD: CPT | Mod: CPTII,S$GLB,, | Performed by: PHYSICIAN ASSISTANT

## 2023-05-02 PROCEDURE — 1159F PR MEDICATION LIST DOCUMENTED IN MEDICAL RECORD: ICD-10-PCS | Mod: CPTII,S$GLB,, | Performed by: PHYSICIAN ASSISTANT

## 2023-05-02 PROCEDURE — 1160F RVW MEDS BY RX/DR IN RCRD: CPT | Mod: CPTII,S$GLB,, | Performed by: PHYSICIAN ASSISTANT

## 2023-05-02 PROCEDURE — 99213 PR OFFICE/OUTPT VISIT, EST, LEVL III, 20-29 MIN: ICD-10-PCS | Mod: S$GLB,,, | Performed by: PHYSICIAN ASSISTANT

## 2023-05-02 PROCEDURE — 99999 PR PBB SHADOW E&M-EST. PATIENT-LVL III: ICD-10-PCS | Mod: PBBFAC,,, | Performed by: PHYSICIAN ASSISTANT

## 2023-05-02 PROCEDURE — 99213 OFFICE O/P EST LOW 20 MIN: CPT | Mod: S$GLB,,, | Performed by: PHYSICIAN ASSISTANT

## 2023-05-02 PROCEDURE — 3075F SYST BP GE 130 - 139MM HG: CPT | Mod: CPTII,S$GLB,, | Performed by: PHYSICIAN ASSISTANT

## 2023-05-02 PROCEDURE — 3078F DIAST BP <80 MM HG: CPT | Mod: CPTII,S$GLB,, | Performed by: PHYSICIAN ASSISTANT

## 2023-05-02 PROCEDURE — 1160F PR REVIEW ALL MEDS BY PRESCRIBER/CLIN PHARMACIST DOCUMENTED: ICD-10-PCS | Mod: CPTII,S$GLB,, | Performed by: PHYSICIAN ASSISTANT

## 2023-05-02 PROCEDURE — 3008F BODY MASS INDEX DOCD: CPT | Mod: CPTII,S$GLB,, | Performed by: PHYSICIAN ASSISTANT

## 2023-05-02 PROCEDURE — 3008F PR BODY MASS INDEX (BMI) DOCUMENTED: ICD-10-PCS | Mod: CPTII,S$GLB,, | Performed by: PHYSICIAN ASSISTANT

## 2023-05-02 PROCEDURE — 99999 PR PBB SHADOW E&M-EST. PATIENT-LVL III: CPT | Mod: PBBFAC,,, | Performed by: PHYSICIAN ASSISTANT

## 2023-05-02 NOTE — PROGRESS NOTES
"[facility]  Department of Surgery    REFERRING PROVIDER: No referring provider defined for this encounter.   CHIEF COMPLAINT: "swollen node" on mammogram    Subjective:      Nisha Mei is a 52 y.o. perimenopausal female referred for evaluation of new axilla changes. Patient states she underwent screening mammogram on 22 which noted a focal asymmetry in her upper outer right breast. Further work up with diagnostic mammo/US recommended. Diagnostic mammogram performed on 23. With spot compression, the asymmetry disappears. No evidence of suspicious masses, calcifications, or other abnormal findings in the right breast. Patient does routinely do self breast exams.  Patient has not noted a change on breast exam.  Patient denies nipple discharge. Patient denies to previous breast biopsy. Patient denies a personal history of breast cancer.    Interval History 23:  Patient returns for 3 month follow up regarding likely reactive node of her right axilla. Patient denies any new masses, skin changes or pain. Overall has been doing well.     GYN History:  Age of menarche was 14.   Perimenopausal.   Patient denies hormonal therapy.   Patient is .   Age of first live birth was 25.    Patient did not breast feed.    FAMILY history:  Denies significant family history of breast or other cancer.     Past Medical History:   Diagnosis Date    Allergy     History of ovarian cyst      Past Surgical History:   Procedure Laterality Date    DILATION AND CURETTAGE OF UTERUS      x3    TUBAL LIGATION Bilateral 2008     No current outpatient medications on file prior to visit.     No current facility-administered medications on file prior to visit.     Social History     Socioeconomic History    Marital status:    Occupational History    Occupation: nurse   Tobacco Use    Smoking status: Never    Smokeless tobacco: Never   Substance and Sexual Activity    Alcohol use: Yes     Comment: rare " "   Drug use: No    Sexual activity: Yes     Partners: Male     Family History   Problem Relation Age of Onset    Hypertension Mother     Mitral valve prolapse Mother     Diabetes Mellitus Paternal Grandfather     Heart attack Maternal Aunt     Breast cancer Neg Hx     Colon cancer Neg Hx     Ovarian cancer Neg Hx     Melanoma Neg Hx        Review of Systems  Review of Systems   Constitutional:  Negative for chills, fever and malaise/fatigue.   HENT:  Negative for congestion.    Eyes:  Negative for discharge.   Respiratory:  Negative for cough, shortness of breath and stridor.    Cardiovascular:  Negative for chest pain and palpitations.   Gastrointestinal:  Negative for abdominal pain and nausea.   Neurological:  Negative for headaches.   Psychiatric/Behavioral:  The patient is not nervous/anxious.       Objective:        /70 (BP Location: Left arm, Patient Position: Sitting, BP Method: Medium (Automatic))   Pulse 63   Ht 5' 2" (1.575 m)   Wt 83 kg (182 lb 15.7 oz)   BMI 33.47 kg/m²   Physical Exam   Vitals reviewed.  Constitutional: She is oriented to person, place, and time.   HENT:   Head: Normocephalic and atraumatic.   Nose: Nose normal.   Eyes: Pupils are equal, round, and reactive to light. Right eye exhibits no discharge. Left eye exhibits no discharge.   Pulmonary/Chest: Effort normal and breath sounds normal. No stridor. No respiratory distress. She exhibits no mass, no tenderness and no edema. Right breast exhibits no inverted nipple, no mass, no nipple discharge, no skin change and no tenderness. Left breast exhibits no inverted nipple, no mass, no nipple discharge, no skin change and no tenderness. No breast swelling or bleeding. Breasts are symmetrical.   Abdominal: Normal appearance.   Genitourinary: No breast swelling or bleeding.   Neurological: She is alert and oriented to person, place, and time.   Skin: Skin is warm and dry.     Psychiatric: Her behavior is normal. Mood, judgment and " thought content normal.      Radiology review: Images personally reviewed by me in the clinic.    12/21/22 Bilateral Screening Mammo:    Findings:  This procedure was performed using tomosynthesis. Computer-aided detection was utilized in the interpretation of this examination.  The breasts have scattered areas of fibroglandular density.      Right  There is a focal asymmetry seen in the upper outer quadrant of the right breast.      Left  There is no evidence of suspicious masses, calcifications, or other abnormal findings in the left breast.     Impression:  Right  Focal Asymmetry: Right breast focal asymmetry at the upper outer position. Assessment: 0 - Incomplete. Diagnostic Mammogram and/or Ultrasound is recommended.      Left  There is no mammographic evidence of malignancy in the left breast.     BI-RADS Category:   Overall: 0 - Incomplete: Needs Additional Imaging Evaluation        Recommendation:  Diagnostic mammogram with possible ultrasound (if indicated) is recommended.    1/17/23 Right Diagnostic Mammo:     Findings:  This procedure was performed using tomosynthesis. Computer-aided detection was utilized in the interpretation of this examination.  The right breast has scattered areas of fibroglandular density.     There is a focal asymmetry seen in the upper outer quadrant of the right breast. On spot compression views, the asymmetry resolves, consistent with benign breast tissue. No evidence of suspicious masses, calcifications, or other abnormal findings in the right breast.     Impression:  There is no mammographic evidence of malignancy in the right breast.     BI-RADS Category:   Overall: 2 - Benign        Recommendation:  Routine screening mammogram in 1 year is recommended      Assessment:      Nisha Mei is a 52 y.o. perimenopausal female with recent changes noted on screening mammogram, follow up diagnostic imaging and CBE negative.      Plan:   1. On examination, no new changes or  concerning findings. Patient reassured.   2. Advised continued self exams and to reach out with any new concerns.   3. Will see back PRN.   4. Patient verbalized understanding of plan. All questions asked and answered. Advised to call our office with any new or worsening sxs.           Will see back PRN.     Patient verbalized understanding of plan. All questions asked and answered. Advised to call our office with any new or worsening sxs.

## 2023-07-27 ENCOUNTER — PATIENT OUTREACH (OUTPATIENT)
Dept: ADMINISTRATIVE | Facility: HOSPITAL | Age: 53
End: 2023-07-27
Payer: COMMERCIAL

## 2023-07-27 DIAGNOSIS — Z12.11 SCREENING FOR COLORECTAL CANCER: Primary | ICD-10-CM

## 2023-07-27 DIAGNOSIS — Z12.12 SCREENING FOR COLORECTAL CANCER: Primary | ICD-10-CM

## 2023-07-27 NOTE — PROGRESS NOTES
Non-compliant GAP report chart review -  07/27/2023  Chart review completed for the following HM test if overdue  (colorectal cancer screening )   Care Everywhere and Media reports - updates requested and reviewed.    WOG orders placed.    FITKIT

## 2023-08-07 ENCOUNTER — LAB VISIT (OUTPATIENT)
Dept: LAB | Facility: HOSPITAL | Age: 53
End: 2023-08-07
Attending: INTERNAL MEDICINE
Payer: COMMERCIAL

## 2023-08-07 DIAGNOSIS — Z12.12 SCREENING FOR COLORECTAL CANCER: ICD-10-CM

## 2023-08-07 DIAGNOSIS — Z12.11 SCREENING FOR COLORECTAL CANCER: ICD-10-CM

## 2023-08-07 PROCEDURE — 82274 ASSAY TEST FOR BLOOD FECAL: CPT | Performed by: INTERNAL MEDICINE

## 2023-08-14 LAB — HEMOCCULT STL QL IA: NEGATIVE

## 2023-11-06 ENCOUNTER — LAB VISIT (OUTPATIENT)
Dept: LAB | Facility: HOSPITAL | Age: 53
End: 2023-11-06
Attending: INTERNAL MEDICINE
Payer: COMMERCIAL

## 2023-11-06 DIAGNOSIS — Z00.00 ANNUAL PHYSICAL EXAM: ICD-10-CM

## 2023-11-06 LAB
ALBUMIN SERPL BCP-MCNC: 4.1 G/DL (ref 3.5–5.2)
ALP SERPL-CCNC: 57 U/L (ref 55–135)
ALT SERPL W/O P-5'-P-CCNC: 15 U/L (ref 10–44)
ANION GAP SERPL CALC-SCNC: 9 MMOL/L (ref 8–16)
AST SERPL-CCNC: 18 U/L (ref 10–40)
BASOPHILS # BLD AUTO: 0.04 K/UL (ref 0–0.2)
BASOPHILS NFR BLD: 0.8 % (ref 0–1.9)
BILIRUB SERPL-MCNC: 0.8 MG/DL (ref 0.1–1)
BUN SERPL-MCNC: 9 MG/DL (ref 6–20)
CALCIUM SERPL-MCNC: 9.7 MG/DL (ref 8.7–10.5)
CHLORIDE SERPL-SCNC: 106 MMOL/L (ref 95–110)
CHOLEST SERPL-MCNC: 207 MG/DL (ref 120–199)
CHOLEST/HDLC SERPL: 3.3 {RATIO} (ref 2–5)
CO2 SERPL-SCNC: 24 MMOL/L (ref 23–29)
CREAT SERPL-MCNC: 0.8 MG/DL (ref 0.5–1.4)
DIFFERENTIAL METHOD: NORMAL
EOSINOPHIL # BLD AUTO: 0.2 K/UL (ref 0–0.5)
EOSINOPHIL NFR BLD: 4.6 % (ref 0–8)
ERYTHROCYTE [DISTWIDTH] IN BLOOD BY AUTOMATED COUNT: 11.9 % (ref 11.5–14.5)
EST. GFR  (NO RACE VARIABLE): >60 ML/MIN/1.73 M^2
ESTIMATED AVG GLUCOSE: 88 MG/DL (ref 68–131)
GLUCOSE SERPL-MCNC: 92 MG/DL (ref 70–110)
HBA1C MFR BLD: 4.7 % (ref 4–5.6)
HCT VFR BLD AUTO: 41 % (ref 37–48.5)
HDLC SERPL-MCNC: 63 MG/DL (ref 40–75)
HDLC SERPL: 30.4 % (ref 20–50)
HGB BLD-MCNC: 13.6 G/DL (ref 12–16)
IMM GRANULOCYTES # BLD AUTO: 0.01 K/UL (ref 0–0.04)
IMM GRANULOCYTES NFR BLD AUTO: 0.2 % (ref 0–0.5)
LDLC SERPL CALC-MCNC: 127.2 MG/DL (ref 63–159)
LYMPHOCYTES # BLD AUTO: 2 K/UL (ref 1–4.8)
LYMPHOCYTES NFR BLD: 42.4 % (ref 18–48)
MCH RBC QN AUTO: 30 PG (ref 27–31)
MCHC RBC AUTO-ENTMCNC: 33.2 G/DL (ref 32–36)
MCV RBC AUTO: 91 FL (ref 82–98)
MONOCYTES # BLD AUTO: 0.4 K/UL (ref 0.3–1)
MONOCYTES NFR BLD: 8.1 % (ref 4–15)
NEUTROPHILS # BLD AUTO: 2.1 K/UL (ref 1.8–7.7)
NEUTROPHILS NFR BLD: 43.9 % (ref 38–73)
NONHDLC SERPL-MCNC: 144 MG/DL
NRBC BLD-RTO: 0 /100 WBC
PLATELET # BLD AUTO: 310 K/UL (ref 150–450)
PMV BLD AUTO: 11.1 FL (ref 9.2–12.9)
POTASSIUM SERPL-SCNC: 4.7 MMOL/L (ref 3.5–5.1)
PROT SERPL-MCNC: 7.2 G/DL (ref 6–8.4)
RBC # BLD AUTO: 4.53 M/UL (ref 4–5.4)
SODIUM SERPL-SCNC: 139 MMOL/L (ref 136–145)
TRIGL SERPL-MCNC: 84 MG/DL (ref 30–150)
TSH SERPL DL<=0.005 MIU/L-ACNC: 1.68 UIU/ML (ref 0.4–4)
WBC # BLD AUTO: 4.79 K/UL (ref 3.9–12.7)

## 2023-11-06 PROCEDURE — 84443 ASSAY THYROID STIM HORMONE: CPT | Performed by: INTERNAL MEDICINE

## 2023-11-06 PROCEDURE — 80061 LIPID PANEL: CPT | Performed by: INTERNAL MEDICINE

## 2023-11-06 PROCEDURE — 83036 HEMOGLOBIN GLYCOSYLATED A1C: CPT | Performed by: INTERNAL MEDICINE

## 2023-11-06 PROCEDURE — 80053 COMPREHEN METABOLIC PANEL: CPT | Performed by: INTERNAL MEDICINE

## 2023-11-06 PROCEDURE — 85025 COMPLETE CBC W/AUTO DIFF WBC: CPT | Performed by: INTERNAL MEDICINE

## 2023-11-06 PROCEDURE — 36415 COLL VENOUS BLD VENIPUNCTURE: CPT | Mod: PO | Performed by: INTERNAL MEDICINE

## 2023-11-09 ENCOUNTER — OFFICE VISIT (OUTPATIENT)
Dept: INTERNAL MEDICINE | Facility: CLINIC | Age: 53
End: 2023-11-09
Payer: COMMERCIAL

## 2023-11-09 VITALS
HEART RATE: 86 BPM | SYSTOLIC BLOOD PRESSURE: 130 MMHG | HEIGHT: 62 IN | DIASTOLIC BLOOD PRESSURE: 78 MMHG | WEIGHT: 182.31 LBS | BODY MASS INDEX: 33.55 KG/M2 | OXYGEN SATURATION: 98 %

## 2023-11-09 DIAGNOSIS — R07.89 MUSCULOSKELETAL CHEST PAIN: ICD-10-CM

## 2023-11-09 DIAGNOSIS — Z00.00 ANNUAL PHYSICAL EXAM: Primary | ICD-10-CM

## 2023-11-09 PROCEDURE — 93010 ELECTROCARDIOGRAM REPORT: CPT | Mod: S$GLB,,, | Performed by: INTERNAL MEDICINE

## 2023-11-09 PROCEDURE — 1160F PR REVIEW ALL MEDS BY PRESCRIBER/CLIN PHARMACIST DOCUMENTED: ICD-10-PCS | Mod: CPTII,S$GLB,, | Performed by: INTERNAL MEDICINE

## 2023-11-09 PROCEDURE — 93005 ELECTROCARDIOGRAM TRACING: CPT | Mod: S$GLB,,, | Performed by: INTERNAL MEDICINE

## 2023-11-09 PROCEDURE — 99999 PR PBB SHADOW E&M-EST. PATIENT-LVL III: ICD-10-PCS | Mod: PBBFAC,,, | Performed by: INTERNAL MEDICINE

## 2023-11-09 PROCEDURE — 3008F BODY MASS INDEX DOCD: CPT | Mod: CPTII,S$GLB,, | Performed by: INTERNAL MEDICINE

## 2023-11-09 PROCEDURE — 1159F PR MEDICATION LIST DOCUMENTED IN MEDICAL RECORD: ICD-10-PCS | Mod: CPTII,S$GLB,, | Performed by: INTERNAL MEDICINE

## 2023-11-09 PROCEDURE — 1159F MED LIST DOCD IN RCRD: CPT | Mod: CPTII,S$GLB,, | Performed by: INTERNAL MEDICINE

## 2023-11-09 PROCEDURE — 99396 PREV VISIT EST AGE 40-64: CPT | Mod: S$GLB,,, | Performed by: INTERNAL MEDICINE

## 2023-11-09 PROCEDURE — 3044F PR MOST RECENT HEMOGLOBIN A1C LEVEL <7.0%: ICD-10-PCS | Mod: CPTII,S$GLB,, | Performed by: INTERNAL MEDICINE

## 2023-11-09 PROCEDURE — 3075F SYST BP GE 130 - 139MM HG: CPT | Mod: CPTII,S$GLB,, | Performed by: INTERNAL MEDICINE

## 2023-11-09 PROCEDURE — 3044F HG A1C LEVEL LT 7.0%: CPT | Mod: CPTII,S$GLB,, | Performed by: INTERNAL MEDICINE

## 2023-11-09 PROCEDURE — 93005 EKG 12-LEAD: ICD-10-PCS | Mod: S$GLB,,, | Performed by: INTERNAL MEDICINE

## 2023-11-09 PROCEDURE — 99999 PR PBB SHADOW E&M-EST. PATIENT-LVL III: CPT | Mod: PBBFAC,,, | Performed by: INTERNAL MEDICINE

## 2023-11-09 PROCEDURE — 99396 PR PREVENTIVE VISIT,EST,40-64: ICD-10-PCS | Mod: S$GLB,,, | Performed by: INTERNAL MEDICINE

## 2023-11-09 PROCEDURE — 3078F PR MOST RECENT DIASTOLIC BLOOD PRESSURE < 80 MM HG: ICD-10-PCS | Mod: CPTII,S$GLB,, | Performed by: INTERNAL MEDICINE

## 2023-11-09 PROCEDURE — 1160F RVW MEDS BY RX/DR IN RCRD: CPT | Mod: CPTII,S$GLB,, | Performed by: INTERNAL MEDICINE

## 2023-11-09 PROCEDURE — 3075F PR MOST RECENT SYSTOLIC BLOOD PRESS GE 130-139MM HG: ICD-10-PCS | Mod: CPTII,S$GLB,, | Performed by: INTERNAL MEDICINE

## 2023-11-09 PROCEDURE — 3078F DIAST BP <80 MM HG: CPT | Mod: CPTII,S$GLB,, | Performed by: INTERNAL MEDICINE

## 2023-11-09 PROCEDURE — 3008F PR BODY MASS INDEX (BMI) DOCUMENTED: ICD-10-PCS | Mod: CPTII,S$GLB,, | Performed by: INTERNAL MEDICINE

## 2023-11-09 PROCEDURE — 93010 EKG 12-LEAD: ICD-10-PCS | Mod: S$GLB,,, | Performed by: INTERNAL MEDICINE

## 2023-11-09 NOTE — PROGRESS NOTES
Patient ID: Nisha Mei is a 52 y.o. female.    Chief Complaint: Annual Exam    HPI Nisha is a 52 y.o. female with no significant PMH who presents for annual exam. She complains of chest pain. It is a soreness in character. It is reproducible to palpation. No associated symptoms such as acid reflux, coughing or wheezing.  No further complaints/concerns. Reviewed lab results from 11/6/23.    Health Maintenance Topics with due status: Not Due       Topic Last Completion Date    TETANUS VACCINE 10/03/2018    Cervical Cancer Screening 11/09/2021    Colorectal Cancer Screening 08/14/2023    Hemoglobin A1c (Diabetic Prevention Screening) 11/06/2023    Lipid Panel 11/06/2023      Review of Systems   Cardiovascular:  Positive for chest pain.   All other systems reviewed and are negative.      Objective:     Vitals:    11/09/23 1357   BP: 130/78   Pulse: 86        Physical Exam  Vitals reviewed.   Constitutional:       General: She is not in acute distress.     Appearance: Normal appearance. She is well-developed. She is obese. She is not ill-appearing, toxic-appearing or diaphoretic.   HENT:      Head: Normocephalic and atraumatic.      Right Ear: External ear normal.      Left Ear: External ear normal.      Nose: Nose normal.   Eyes:      General: No scleral icterus.        Right eye: No discharge.         Left eye: No discharge.      Extraocular Movements: Extraocular movements intact.      Conjunctiva/sclera: Conjunctivae normal.   Cardiovascular:      Rate and Rhythm: Normal rate and regular rhythm.      Heart sounds: Normal heart sounds. No murmur heard.     No friction rub. No gallop.   Pulmonary:      Effort: Pulmonary effort is normal. No respiratory distress.      Breath sounds: Normal breath sounds. No stridor. No wheezing, rhonchi or rales.   Chest:       Musculoskeletal:        Arms:       Comments: Areas of pain as shown. Worsening pain with palpation of these areas.    Skin:     General: Skin is  warm and dry.   Neurological:      General: No focal deficit present.      Mental Status: She is alert and oriented to person, place, and time. Mental status is at baseline.   Psychiatric:         Mood and Affect: Mood normal.         Behavior: Behavior normal.         Thought Content: Thought content normal.         Judgment: Judgment normal.         Assessment:       1. Annual physical exam    2. Musculoskeletal chest pain Chronic       Plan:         Annual physical exam  -     EKG 12-lead; Future    Musculoskeletal chest pain  Comments:  Use heating pad, stretching, massage, topical med (such as lidocaine gel), tylenol and ibuprofen PRN pain        RTC 1 year and PRN    Warning signs discussed, patient to call with any further issues or worsening of symptoms.       Parts of the above note were dictated using a voice dictation software. Please excuse any grammatical or typographical errors.

## 2024-02-09 ENCOUNTER — HOSPITAL ENCOUNTER (OUTPATIENT)
Dept: RADIOLOGY | Facility: HOSPITAL | Age: 54
Discharge: HOME | End: 2024-02-09
Attending: PHYSICIAN ASSISTANT
Payer: COMMERCIAL

## 2024-02-09 DIAGNOSIS — Z12.31 SCREENING MAMMOGRAM, ENCOUNTER FOR: ICD-10-CM

## 2024-02-09 PROCEDURE — 77063 BREAST TOMOSYNTHESIS BI: CPT | Mod: 26,,, | Performed by: RADIOLOGY

## 2024-02-09 PROCEDURE — 77067 SCR MAMMO BI INCL CAD: CPT | Mod: TC

## 2024-02-09 PROCEDURE — 77067 SCR MAMMO BI INCL CAD: CPT | Mod: 26,,, | Performed by: RADIOLOGY

## 2024-03-13 ENCOUNTER — PATIENT MESSAGE (OUTPATIENT)
Dept: INTERNAL MEDICINE | Facility: CLINIC | Age: 54
End: 2024-03-13
Payer: COMMERCIAL

## 2024-03-13 ENCOUNTER — TELEPHONE (OUTPATIENT)
Dept: INTERNAL MEDICINE | Facility: CLINIC | Age: 54
End: 2024-03-13
Payer: COMMERCIAL

## 2024-03-13 DIAGNOSIS — R07.9 CHEST PAIN, UNSPECIFIED TYPE: Primary | ICD-10-CM

## 2024-03-24 NOTE — PROGRESS NOTES
"[facility] Card[facility] [address]     SUBJECTIVE:     History of Present Illness:  Patient is a 53 y.o. female presents with chest pains mid chest and under left arm in 2024. In Early February, had the similar pain but it radiated to her left shoulder and felt weak. She was shopping at that time. She went home and it was better. Since then , has had other types of symptoms like burning left chest area. Sometimes has weakness left arm; sharp pain left back also. One day walked in park, noted the same feeling and stopped. 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 Similar symptoms in  and it resolved after all tests done. Now has returned . This could happen at any time. 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 ; walks in the park with no episodes but occasional feeling in the mid chest   Review of patient's allergies indicates:  No Known Allergies    Past Medical History:   Diagnosis Date    Allergy     History of ovarian cyst        Past Surgical History:   Procedure Laterality Date    DILATION AND CURETTAGE OF UTERUS      x3    TUBAL LIGATION Bilateral 2008           Past Hospitalization:         Cardiac meds:  none        OBJECTIVE:     Vital Signs (Most Recent)  Vitals:    24 1312   BP: 137/89   Pulse: 80   SpO2: 97%   Weight: 83.6 kg (184 lb 3.1 oz)   Height: 5' 2" (1.575 m)         Physical Exam:  Neck: normal carotids, no bruits; normal JVP  Lungs :clear  Heart: RR, normal S1,S2, no murmurs, no gallops  Abd: no masses; no bruits;   Exts: normal DP and PT pulses bilaterally, normal radials; no edema noted               Labs  : , CMP normal; A1c normal     Diagnostic Results:    1.EK/23: normal   2.Echo: : normal EF   3. " Stress test: nuclear 2020: negative for ischemia; EF normal   4. cath    Chart review:      ASSESSMENT/PLAN:     Atypical chest pain- doubt this is cardiac  2. No risk factors for CAD  Plan: reassurance  Stress echo for reassurance  Return as needed     Samanta Gabriel MD

## 2024-03-27 ENCOUNTER — OFFICE VISIT (OUTPATIENT)
Dept: CARDIOLOGY | Facility: CLINIC | Age: 54
End: 2024-03-27
Payer: COMMERCIAL

## 2024-03-27 VITALS
BODY MASS INDEX: 33.9 KG/M2 | OXYGEN SATURATION: 97 % | HEIGHT: 62 IN | WEIGHT: 184.19 LBS | HEART RATE: 80 BPM | SYSTOLIC BLOOD PRESSURE: 137 MMHG | DIASTOLIC BLOOD PRESSURE: 89 MMHG

## 2024-03-27 DIAGNOSIS — R07.2 PRECORDIAL PAIN: Primary | ICD-10-CM

## 2024-03-27 DIAGNOSIS — R07.9 CHEST PAIN, UNSPECIFIED TYPE: ICD-10-CM

## 2024-03-27 PROCEDURE — 99204 OFFICE O/P NEW MOD 45 MIN: CPT | Mod: S$GLB,,, | Performed by: INTERNAL MEDICINE

## 2024-03-27 PROCEDURE — 99999 PR PBB SHADOW E&M-EST. PATIENT-LVL III: CPT | Mod: PBBFAC,,, | Performed by: INTERNAL MEDICINE

## 2024-03-27 PROCEDURE — 1160F RVW MEDS BY RX/DR IN RCRD: CPT | Mod: CPTII,S$GLB,, | Performed by: INTERNAL MEDICINE

## 2024-03-27 PROCEDURE — 3075F SYST BP GE 130 - 139MM HG: CPT | Mod: CPTII,S$GLB,, | Performed by: INTERNAL MEDICINE

## 2024-03-27 PROCEDURE — 3008F BODY MASS INDEX DOCD: CPT | Mod: CPTII,S$GLB,, | Performed by: INTERNAL MEDICINE

## 2024-03-27 PROCEDURE — 1159F MED LIST DOCD IN RCRD: CPT | Mod: CPTII,S$GLB,, | Performed by: INTERNAL MEDICINE

## 2024-03-27 PROCEDURE — 3079F DIAST BP 80-89 MM HG: CPT | Mod: CPTII,S$GLB,, | Performed by: INTERNAL MEDICINE

## 2024-04-11 ENCOUNTER — HOSPITAL ENCOUNTER (OUTPATIENT)
Dept: CARDIOLOGY | Facility: HOSPITAL | Age: 54
Discharge: HOME | End: 2024-04-11
Attending: INTERNAL MEDICINE
Payer: COMMERCIAL

## 2024-04-11 DIAGNOSIS — R07.9 CHEST PAIN, UNSPECIFIED TYPE: ICD-10-CM

## 2024-04-11 LAB
CV STRESS BASE HR: 64 BPM
DIASTOLIC BLOOD PRESSURE: 82 MMHG
OHS CV CPX 1 MINUTE RECOVERY HEART RATE: 131 BPM
OHS CV CPX 85 PERCENT MAX PREDICTED HEART RATE MALE: 143
OHS CV CPX ESTIMATED METS: 10
OHS CV CPX MAX PREDICTED HEART RATE: 168
OHS CV CPX PATIENT IS FEMALE: 1
OHS CV CPX PATIENT IS MALE: 0
OHS CV CPX PEAK DIASTOLIC BLOOD PRESSURE: 83 MMHG
OHS CV CPX PEAK HEAR RATE: 162 BPM
OHS CV CPX PEAK RATE PRESSURE PRODUCT: NORMAL
OHS CV CPX PEAK SYSTOLIC BLOOD PRESSURE: 170 MMHG
OHS CV CPX PERCENT MAX PREDICTED HEART RATE ACHIEVED: 101
OHS CV CPX RATE PRESSURE PRODUCT PRESENTING: 8448
STRESS ECHO POST EXERCISE DUR MIN: 9 MINUTES
STRESS ECHO POST EXERCISE DUR SEC: 0 SECONDS
SYSTOLIC BLOOD PRESSURE: 132 MMHG

## 2024-04-11 PROCEDURE — 93351 STRESS TTE COMPLETE: CPT

## 2024-04-17 ENCOUNTER — OFFICE VISIT (OUTPATIENT)
Dept: OBSTETRICS AND GYNECOLOGY | Facility: CLINIC | Age: 54
End: 2024-04-17
Attending: OBSTETRICS & GYNECOLOGY
Payer: COMMERCIAL

## 2024-04-17 ENCOUNTER — LAB VISIT (OUTPATIENT)
Dept: LAB | Facility: OTHER | Age: 54
End: 2024-04-17
Attending: OBSTETRICS & GYNECOLOGY
Payer: COMMERCIAL

## 2024-04-17 VITALS
SYSTOLIC BLOOD PRESSURE: 142 MMHG | DIASTOLIC BLOOD PRESSURE: 82 MMHG | BODY MASS INDEX: 33.86 KG/M2 | WEIGHT: 184 LBS | HEIGHT: 62 IN

## 2024-04-17 DIAGNOSIS — N95.1 PERIMENOPAUSAL SYMPTOMS: ICD-10-CM

## 2024-04-17 DIAGNOSIS — Z12.4 ENCOUNTER FOR PAPANICOLAOU SMEAR FOR CERVICAL CANCER SCREENING: ICD-10-CM

## 2024-04-17 DIAGNOSIS — Z01.419 ENCOUNTER FOR GYNECOLOGICAL EXAMINATION WITHOUT ABNORMAL FINDING: Primary | ICD-10-CM

## 2024-04-17 LAB
ESTRADIOL SERPL-MCNC: 11 PG/ML
FSH SERPL-ACNC: 39.93 MIU/ML

## 2024-04-17 PROCEDURE — 3077F SYST BP >= 140 MM HG: CPT | Mod: CPTII,S$GLB,, | Performed by: OBSTETRICS & GYNECOLOGY

## 2024-04-17 PROCEDURE — 99999 PR PBB SHADOW E&M-EST. PATIENT-LVL III: CPT | Mod: PBBFAC,,, | Performed by: OBSTETRICS & GYNECOLOGY

## 2024-04-17 PROCEDURE — 1160F RVW MEDS BY RX/DR IN RCRD: CPT | Mod: CPTII,S$GLB,, | Performed by: OBSTETRICS & GYNECOLOGY

## 2024-04-17 PROCEDURE — 3008F BODY MASS INDEX DOCD: CPT | Mod: CPTII,S$GLB,, | Performed by: OBSTETRICS & GYNECOLOGY

## 2024-04-17 PROCEDURE — 82670 ASSAY OF TOTAL ESTRADIOL: CPT | Performed by: OBSTETRICS & GYNECOLOGY

## 2024-04-17 PROCEDURE — 1159F MED LIST DOCD IN RCRD: CPT | Mod: CPTII,S$GLB,, | Performed by: OBSTETRICS & GYNECOLOGY

## 2024-04-17 PROCEDURE — 99386 PREV VISIT NEW AGE 40-64: CPT | Mod: S$GLB,,, | Performed by: OBSTETRICS & GYNECOLOGY

## 2024-04-17 PROCEDURE — 3079F DIAST BP 80-89 MM HG: CPT | Mod: CPTII,S$GLB,, | Performed by: OBSTETRICS & GYNECOLOGY

## 2024-04-17 PROCEDURE — 36415 COLL VENOUS BLD VENIPUNCTURE: CPT | Performed by: OBSTETRICS & GYNECOLOGY

## 2024-04-17 PROCEDURE — 83001 ASSAY OF GONADOTROPIN (FSH): CPT | Performed by: OBSTETRICS & GYNECOLOGY

## 2024-04-17 PROCEDURE — 88175 CYTOPATH C/V AUTO FLUID REDO: CPT | Performed by: OBSTETRICS & GYNECOLOGY

## 2024-04-17 PROCEDURE — 87624 HPV HI-RISK TYP POOLED RSLT: CPT | Performed by: OBSTETRICS & GYNECOLOGY

## 2024-04-17 NOTE — PROGRESS NOTES
Subjective:       Patient ID: Nisha Mei is a 53 y.o. female.    Chief Complaint:  well women and Gynecologic Exam      History of Present Illness  HPI    Nisha Mei is a 53 y.o. female  new to me here for her annual GYN exam.  She has had her gyn exams done for the past several years by her PCP.   She describes her periods as stopped about 2 months ago, had only had light spotty irregular periods for the past several years, had an endometrial ablation 2017 with Dr. Fowler for heavy bleeding, which resolved following the ablation. She has begun to have night sweats that wake her up occasionally during the past few months.  denies break through bleeding.   denies vaginal itching or irritation.  Denies vaginal discharge.  She is sexually active. She has had 1 partner for 22 years .     History of abnormal pap: No  Last Pap: approximate date  and was normal(and negative for HR HPV  Last MMG: normal---: BI-RADS Category: Overall: 1 - Negative-routine follow-up in 12 months  Last Colonoscopy:  FITT test only, normal  denies domestic violence. She does feel safe at home.     Past Medical History:   Diagnosis Date    Allergy     History of ovarian cyst      Past Surgical History:   Procedure Laterality Date    DILATION AND CURETTAGE OF UTERUS      x3    Novasure endometrial ablation  2017    TUBAL LIGATION Bilateral 2008     Social History     Socioeconomic History    Marital status:    Occupational History    Occupation: nurse   Tobacco Use    Smoking status: Never    Smokeless tobacco: Never   Substance and Sexual Activity    Alcohol use: Yes     Comment: rare    Drug use: No    Sexual activity: Yes     Partners: Male     Comment:  since      Family History   Problem Relation Name Age of Onset    Diabetes Mellitus Paternal Grandfather      Hypertension Mother      Mitral valve prolapse Mother      Heart attack Maternal Aunt      Heart attacks  "under age 50 Cousin  40    Breast cancer Neg Hx      Colon cancer Neg Hx      Ovarian cancer Neg Hx      Melanoma Neg Hx      Diabetes Neg Hx      Stroke Neg Hx       OB History          6    Para   3    Term   3            AB   3    Living   1         SAB   3    IAB        Ectopic        Multiple        Live Births   3                 BP (!) 142/82   Ht 5' 2" (1.575 m)   Wt 83.5 kg (184 lb)   LMP 2023 (Approximate)   BMI 33.65 kg/m²         GYN & OB History  Patient's last menstrual period was 2023 (approximate).   Date of Last Pap: 2024    OB History    Para Term  AB Living   6 3 3   3 1   SAB IAB Ectopic Multiple Live Births   3       3      # Outcome Date GA Lbr Silvestre/2nd Weight Sex Type Anes PTL Lv   6 Term      Vag-Spont  N LEIGH   5 Term      Vag-Spont  N LEIGH   4 Term      Vag-Spont  N LEIGH   3 SAB            2 SAB            1 SAB                Review of Systems  Review of Systems   Constitutional:  Negative for activity change, appetite change, fatigue and unexpected weight change.   HENT: Negative.     Eyes:  Negative for visual disturbance.   Respiratory:  Negative for shortness of breath and wheezing.    Cardiovascular:  Negative for chest pain, palpitations and leg swelling.   Gastrointestinal:  Negative for abdominal pain, bloating and blood in stool.   Endocrine: Negative for diabetes and hair loss.   Genitourinary:  Positive for hot flashes. Negative for decreased libido, dyspareunia, menstrual problem and vaginal dryness.   Musculoskeletal:  Negative for back pain and joint swelling.   Integumentary:  Negative for acne, hair changes and nipple discharge.   Neurological:  Negative for headaches.   Hematological:  Does not bruise/bleed easily.   Psychiatric/Behavioral:  Positive for sleep disturbance. Negative for depression. The patient is not nervous/anxious.    Breast: Negative for mastodynia and nipple discharge          Objective:      Physical Exam: "   Constitutional: She is oriented to person, place, and time. She appears well-developed and well-nourished.    HENT:   Head: Normocephalic and atraumatic.    Eyes: Pupils are equal, round, and reactive to light. EOM are normal.     Cardiovascular:  Normal rate and regular rhythm.             Pulmonary/Chest: Effort normal and breath sounds normal.   BREASTS:  no mass, no tenderness, no deformity and no retraction. Right breast exhibits no inverted nipple, no mass, no nipple discharge, no skin change, no tenderness, no bleeding and no swelling. Left breast exhibits no inverted nipple, no mass, no nipple discharge, no skin change, no tenderness, no bleeding and no swelling. Breasts are symmetrical.              Abdominal: Soft. Bowel sounds are normal.     Genitourinary:    Pelvic exam was performed with patient supine.      Genitourinary Comments: PELVIC: Normal external genitalia without lesions.  Normal hair distribution.  Adequate perineal body, normal urethral meatus.  Vagina moist and well rugated without lesions or discharge.  Cervix pink, Parous, without lesions, discharge or tenderness.  No significant cystocele or rectocele.  Bimanual exam shows uterus to be normal size, regular, mobile and nontender.  Adnexa without masses or tenderness.                   Musculoskeletal: Normal range of motion and moves all extremeties.       Neurological: She is alert and oriented to person, place, and time.    Skin: Skin is warm and dry.    Psychiatric: She has a normal mood and affect.              Assessment:        1. Encounter for gynecological examination without abnormal finding    2. Encounter for Papanicolaou smear for cervical cancer screening    3. Perimenopausal symptoms                Plan:        Problem List Items Addressed This Visit    None  Visit Diagnoses       Encounter for gynecological examination without abnormal finding    -  Primary  COUNSELING:  The patient was counseled today on regular weight  bearing exercise. Patient was counseled today on the new ACS guidelines for cervical cytology screening as well as the current recommendations for breast cancer screening. Counseling session lasted approximately 10 minutes, and all her questions were answered. She was advised to see her primary care physician for all other health maintenance.   FOLLOW-UP with me for next routine visit.       Encounter for Papanicolaou smear for cervical cancer screening        Relevant Orders    HPV High Risk Genotypes, PCR    Liquid-Based Pap Smear, Screening    Perimenopausal symptoms        Relevant Orders    Follicle Stimulating Hormone    Estradiol             Follow up in about 1 year (around 4/17/2025).

## 2024-04-18 ENCOUNTER — TELEPHONE (OUTPATIENT)
Dept: OBSTETRICS AND GYNECOLOGY | Facility: CLINIC | Age: 54
End: 2024-04-18
Payer: COMMERCIAL

## 2024-04-18 NOTE — TELEPHONE ENCOUNTER
Called patient no answer lvm with [phone] to assist with scheduling a virtual or in office hormone consult.

## 2024-04-18 NOTE — TELEPHONE ENCOUNTER
----- Message from Kathleen Phillip MD sent at 4/18/2024  7:14 AM CDT -----  Please call her to see if she wishes to schedule hormone consult, ok for virtual

## 2024-04-24 LAB
FINAL PATHOLOGIC DIAGNOSIS: NORMAL
Lab: NORMAL

## 2024-04-30 ENCOUNTER — PATIENT MESSAGE (OUTPATIENT)
Dept: OBSTETRICS AND GYNECOLOGY | Facility: CLINIC | Age: 54
End: 2024-04-30
Payer: COMMERCIAL

## 2024-05-01 DIAGNOSIS — N76.0 BACTERIAL VAGINOSIS: Primary | ICD-10-CM

## 2024-05-01 DIAGNOSIS — B96.89 BACTERIAL VAGINOSIS: Primary | ICD-10-CM

## 2024-05-01 RX ORDER — TINIDAZOLE 500 MG/1
2 TABLET ORAL
Qty: 8 TABLET | Refills: 0 | Status: SHIPPED | OUTPATIENT
Start: 2024-05-01 | End: 2024-05-03

## 2024-06-18 ENCOUNTER — OFFICE VISIT (OUTPATIENT)
Dept: OBSTETRICS AND GYNECOLOGY | Facility: CLINIC | Age: 54
End: 2024-06-18
Attending: OBSTETRICS & GYNECOLOGY
Payer: COMMERCIAL

## 2024-06-18 DIAGNOSIS — N95.1 MENOPAUSAL SYNDROME: Primary | ICD-10-CM

## 2024-06-18 PROCEDURE — 1160F RVW MEDS BY RX/DR IN RCRD: CPT | Mod: CPTII,S$GLB,, | Performed by: OBSTETRICS & GYNECOLOGY

## 2024-06-18 PROCEDURE — 99999 PR PBB SHADOW E&M-EST. PATIENT-LVL II: CPT | Mod: PBBFAC,,, | Performed by: OBSTETRICS & GYNECOLOGY

## 2024-06-18 PROCEDURE — 99214 OFFICE O/P EST MOD 30 MIN: CPT | Mod: S$GLB,,, | Performed by: OBSTETRICS & GYNECOLOGY

## 2024-06-18 PROCEDURE — 1159F MED LIST DOCD IN RCRD: CPT | Mod: CPTII,S$GLB,, | Performed by: OBSTETRICS & GYNECOLOGY

## 2024-06-18 RX ORDER — PROGESTERONE 100 MG/1
100 CAPSULE ORAL NIGHTLY
Qty: 30 CAPSULE | Refills: 6 | Status: SHIPPED | OUTPATIENT
Start: 2024-06-18

## 2024-06-18 NOTE — PROGRESS NOTES
Nisha Mei is a 53 y.o. female who presents to discuss hormone replacement therapy.  Menarche occurred at age 12 and the patient went into menopause at 53 years of age, , is not yet fully menopausal , has occasional spotting 2-3 x/year, had an ablation in 2-017 . Began having night sweats intermittently during the past 6 months. Patient is requesting hormone replacement therapy due to hot flashes, night sweats. Denies insomnia. The patient is not taking hormone replacement therapy. Cyclic bleeding is reported: irregular occurring approximately every 60-90 days without intermenstrual spotting. The patient is sexually active.  She denies the following contraindications to HRT:  Vaginal bleeding, history of VTE/PE, thrombophilia,  breast cancer, or active liver disease.   She walks at the [employer] 3-4 x/week 2 miles.    PCP: Dr. Alisa Thomas       Routine labs: November 2023  Pap smear: 4/24/2024Normal, neg HR HPV  Mammogram: 2-9,2024 BI-RADS Category: Overall: 1 - Negative  DEXA: NA  Colonoscopy: Fitt test, 2023, normal    Lab Visit on 04/17/2024   Component Date Value Ref Range Status    Follicle Stimulating Hormone 04/17/2024 39.93  See Text mIU/mL Final    Estradiol 04/17/2024 11  See Text pg/mL Final   Office Visit on 04/17/2024   Component Date Value Ref Range Status    HPV other High Risk types, PCR 04/17/2024 Negative  Negative Final    HPV High Risk type 16, PCR 04/17/2024 Negative  Negative Final    HPV High Risk type 18, PCR 04/17/2024 Negative  Negative Final    Final Pathologic Diagnosis 04/17/2024    Final                    Value:Specimen Adequacy  Satisfactory for interpretation. Endocervical component is present.    Ryan Category  Negative for intraepithelial lesion or malignancy.  Shift in kaushik suggestive of bacterial vaginosis.      Disclaimer 04/17/2024    Final                    Value:The Pap smear is a screening test that aids in the detection of cervical cancer and cancer  precursors. Both false positive and false negative results can occur. The test should be used at regular intervals, and positive results should be confirmed before   definitive therapy.  This liquid based specimen is processed using the  or  Thin PrepPAP System. This specimen has been analyzed by the ThinPrep Imaging System (Zoomabet), an automated imaging and review system which assists the laboratory in evaluating   cells on ThinPrep PAP tests. Following automated imaging, selected fields from every slide are reviewed by a cytotechnologist and/or pathologist.     Screening was performed at [facility], [address] [place] [address].     Hospital Outpatient Visit on 04/11/2024   Component Date Value Ref Range Status    85% Max Predicted HR 04/11/2024 143   Final    Max Predicted HR 04/11/2024 168   Final    [facility] CV CPX PATIENT IS MALE 04/11/2024 0.0   Final    [facility] CV CPX PATIENT IS FEMALE 04/11/2024 1.0   Final    HR at rest 04/11/2024 64  bpm Final    Systolic blood pressure 04/11/2024 132  mmHg Final    Diastolic blood pressure 04/11/2024 82  mmHg Final    RPP 04/11/2024 8,448   Final    Exercise duration (min) 04/11/2024 9  minutes Final    Exercise duration (sec) 04/11/2024 0  seconds Final    Peak HR 04/11/2024 162  bpm Final    Peak Systolic BP 04/11/2024 170  mmHg Final    Peak Diatolic BP 04/11/2024 83  mmHg Final    Peak RPP 04/11/2024 27,540   Final    Estimated METs 04/11/2024 10   Final    % Max HR Achieved 04/11/2024 101   Final    1 Minute Recovery HR 04/11/2024 131  bpm Final       Past Medical History:   Diagnosis Date    Allergy     History of ovarian cyst      Past Surgical History:   Procedure Laterality Date    DILATION AND CURETTAGE OF UTERUS      x3    Novasure endometrial ablation  12/2017    TUBAL LIGATION Bilateral 11/19/2008     Social History     Tobacco Use    Smoking status: Never    Smokeless tobacco: Never    Substance Use Topics    Alcohol use: Yes     Comment: rare    Drug use: No     Family History   Problem Relation Name Age of Onset    Diabetes Mellitus Paternal Grandfather      Hypertension Mother      Mitral valve prolapse Mother      Heart attack Maternal Aunt      Heart attacks under age 50 Cousin  40    Breast cancer Neg Hx      Colon cancer Neg Hx      Ovarian cancer Neg Hx      Melanoma Neg Hx      Diabetes Neg Hx      Stroke Neg Hx       OB History    Para Term  AB Living   6 3 3   3 1   SAB IAB Ectopic Multiple Live Births   3       3      # Outcome Date GA Lbr Silvestre/2nd Weight Sex Type Anes PTL Lv   6 Term      Vag-Spont  N LEIGH   5 Term      Vag-Spont  N LEIGH   4 Term      Vag-Spont  N LEIGH   3 SAB            2 SAB            1 SAB                Current Outpatient Medications:     progesterone (PROMETRIUM) 100 MG capsule, Take 1 capsule (100 mg total) by mouth nightly., Disp: 30 capsule, Rfl: 6    Review of Systems:  General: No fever, chills, or weight loss.  Chest: No chest pain, shortness of breath, or palpitations.  Breast: No pain, masses, or nipple discharge.  Vulva: No pain, lesions, or itching.  Vagina: No relaxation, itching, discharge, or lesions.  Abdomen: No pain, nausea, vomiting, diarrhea, or constipation.  Urinary: No incontinence, nocturia, frequency, or dysuria.  Extremities:  No leg cramps, edema, or calf pain.  Neurologic: No headaches, dizziness, or visual changes.    There were no vitals filed for this visit.  There is no height or weight on file to calculate BMI.    Assessment:    Menopausal syndrome  -     progesterone (PROMETRIUM) 100 MG capsule; Take 1 capsule (100 mg total) by mouth nightly.  Dispense: 30 capsule; Refill: 6        Plan:   Risks and benefits of hormone replacement therapy were discussed.  Hormone replacement therapy options, including bioidentical versus non-bioidentical hormones, as well as alternatives discussed.    We discussed that starting  estradiol early in perimenopause may increase irregular bleeding.   Will begin Progesterone for now and recheck in 3 months.   Follow up in 3 months  Will recheck labs once on typical optimal dose or if having side effects.  Instructed patient to call if she experiences any side effects or has any questions.  I spent 30 minutes with this patient today, >50% counseling.

## 2024-06-24 ENCOUNTER — PATIENT OUTREACH (OUTPATIENT)
Dept: ADMINISTRATIVE | Facility: HOSPITAL | Age: 54
End: 2024-06-24
Payer: COMMERCIAL

## 2024-06-24 NOTE — PROGRESS NOTES
Population Health Chart Review & Patient Outreach Details      Additional [facility] Health Notes:               Updates Requested / Reviewed:      Updated Care Coordination Note, Care Everywhere, and Immunizations Reconciliation Completed or Queried: [place]         Health Maintenance Topics Overdue:      VBHM Score: 0     Patient is not due for any topics at this time.                       Health Maintenance Topic(s) Outreach Outcomes & Actions Taken:    Provider Pt Reattribution - Outreach Outcomes & Actions Taken  : Telephone outreach, lvm

## 2024-08-23 ENCOUNTER — PATIENT MESSAGE (OUTPATIENT)
Dept: ADMINISTRATIVE | Facility: HOSPITAL | Age: 54
End: 2024-08-23
Payer: COMMERCIAL

## 2024-08-27 ENCOUNTER — PATIENT OUTREACH (OUTPATIENT)
Dept: ADMINISTRATIVE | Facility: HOSPITAL | Age: 54
End: 2024-08-27
Payer: COMMERCIAL

## 2024-09-23 ENCOUNTER — OFFICE VISIT (OUTPATIENT)
Dept: DERMATOLOGY | Facility: CLINIC | Age: 54
End: 2024-09-23
Payer: COMMERCIAL

## 2024-09-23 DIAGNOSIS — L82.1 SK (SEBORRHEIC KERATOSIS): ICD-10-CM

## 2024-09-23 DIAGNOSIS — D18.01 CHERRY ANGIOMA: ICD-10-CM

## 2024-09-23 DIAGNOSIS — L73.8 SEBACEOUS HYPERPLASIA: ICD-10-CM

## 2024-09-23 DIAGNOSIS — L81.4 LENTIGINES: Primary | ICD-10-CM

## 2024-09-23 DIAGNOSIS — Z12.83 SKIN CANCER SCREENING: ICD-10-CM

## 2024-09-23 DIAGNOSIS — D23.9 DERMATOFIBROMA: ICD-10-CM

## 2024-09-23 DIAGNOSIS — D22.9 MULTIPLE BENIGN NEVI: ICD-10-CM

## 2024-09-23 PROCEDURE — 1160F RVW MEDS BY RX/DR IN RCRD: CPT | Mod: CPTII,S$GLB,, | Performed by: DERMATOLOGY

## 2024-09-23 PROCEDURE — 99213 OFFICE O/P EST LOW 20 MIN: CPT | Mod: S$GLB,,, | Performed by: DERMATOLOGY

## 2024-09-23 PROCEDURE — 99999 PR PBB SHADOW E&M-EST. PATIENT-LVL II: CPT | Mod: PBBFAC,,, | Performed by: DERMATOLOGY

## 2024-09-23 PROCEDURE — 1159F MED LIST DOCD IN RCRD: CPT | Mod: CPTII,S$GLB,, | Performed by: DERMATOLOGY

## 2024-09-23 NOTE — PATIENT INSTRUCTIONS
CRYOSURGERY      Your doctor has used a method called cryosurgery to treat your skin condition. Cryosurgery refers to the use of very cold substances to treat a variety of skin conditions such as warts, pre-skin cancers, molluscum contagiosum, sun spots, and several benign growths. The substance we use in cryosurgery is liquid nitrogen and is so cold (-195 degrees Celsius) that is burns when administered.     Following treatment in the office, the skin may immediately burn and become red. You may find the area around the lesion is affected as well. It is sometimes necessary to treat not only the lesion, but a small area of the surrounding normal skin to achieve a good response.     A blister, and even a blood filled blister, may form after treatment.   This is a normal response. If the blister is painful, it is acceptable to sterilize a needle and with rubbing alcohol and gently pop the blister. It is important that you gently wash the area with soap and warm water as the blister fluid may contain wart virus if a wart was treated. Do no remove the roof of the blister.     The area treated can take anywhere from 1-3 weeks to heal. Healing time depends on the kind skin lesion treated, the location, and how aggressively the lesion was treated. It is recommended that the areas treated are covered with Vaseline or bacitracin ointment and a band-aid. If a band-aid is not practical, just ointment applied several times per day will do. Keeping these areas moist will speed the healing time.    Treatment with liquid nitrogen can leave a scar. In dark skin, it may be a light or dark scar, in light skin it may be a white or pink scar. These will generally fade with time, but may never go away completely.     If you have any concerns after your treatment, please feel free to call the office.       [address] [place] [address]/ [phone] FAX/ [email or web address]     Sun Protection      The [facility] would like to remind you of the importance of sun protection all year round and particularly during the summer when the suns rays are the strongest. It has been proven that both acute and chronic sun exposure damages our cells and leads to skin cancer. Beyond skin cancer, the sun causes 90% of the symptoms of premature skin aging, including wrinkles, lentigines (brown spots), and thin, easily bruised skin. Proper sun protection can help prevent these unwanted conditions.    Many patients report that they dont go in the sun. It has been shown that the average person receives 18 hours of incidental sun exposure per week during activities such as walking through parking lots, driving, or sitting next to windows. This accumulates to several bad sunburns per year!    In choosing sunscreen, you want one that protects against both UVA and UVB rays (broad spectrum). It is recommended that you use one of SPF 30 or higher. It is important to apply the sunscreen about 20 minutes prior to sun exposure. Most sunscreens are chemical sunscreens and a reaction must take place in the skin so that they are effective. If they are applied and then you are immediately exposed to the sun or start sweating, this reaction has not had time to take place and you are therefore unprotected. Sunscreen needs to be reapplied every 2 hours if you are participating in water sports or sweating. We recommend Elta MD or CeraVe sunscreens for daily use; however there are many options and it is most important for you to find one that you will use on a consistent basis.    If you have sensitive skin, you may do best with a sunscreen that contains only physical blockers in the active ingredient section. The only physical blockers available in the [place] currently are titanium dioxide or zinc oxide. These are typically thicker and harder to apply, however they afford very good protection. Neutrogena Sensitive Skin, Blue Lizard  Sensitive Skin (pink top) or Neutrogena Pure and Free are popular ones.     Aside from sunscreen, clothes with UV protection (UPF), wide brimmed hats, and sunglasses are other means of sun protection that we recommend.      Based on a recent study (6/2021) and out of an abundance of caution, we are recommending that you AVOID the following sunscreens as they may contain the carcinogen, benzene:    Spray and gel sunscreens  Any [employer] or [employer] brands as well as Max Block and TopCare brands   Neutrogena Ultra Sheer Dry-touch Water Resistant Sunscreen LOTION SPF 70   Neutrogena Sheer Zinc Dry-touch Face Sunscreen LOTION SPF 50   5.   Aveeno Baby Continuous Protection Sensitive Skin Sunscreen LOTION - Broad Spectrum SPF 50    Please note that Benzene is not an ingredient or the degradation product of any ingredient in any sunscreen. This study suggested that the findings are a result of contamination in the manufacturing process. At this point, we don't know how effectively Benzene gets through the skin, if it gets absorbed systemically, and what effects it may have.     We do know that ultraviolet radiation is a well-established carcinogen. Please use daily sun protection/avoidance and use of at least SPF 30, broad-spectrum sunscreen not listed above.                       [facility]  [facility] - DERMATOLOGY [address]  [facility] [id number]  Dept: [phone]  Dept Fax: [phone]                                                                                 lower extremity swelling  placed on 2L NC by EMS, room air pulse ox was in low 90s per EMS

## 2024-09-23 NOTE — PROGRESS NOTES
Subjective:      Patient ID:  Nisha Mei is a 53 y.o. female who presents for   Chief Complaint   Patient presents with    Skin Check     UBSE     Patient here for UBSE Body Skin Exam    Last seen by dermatologist: 01/30/2023    yes- personal history of atypical moles removed  no - personal history of MM   no - family history of MM  yes - childhood blistering sunburns  no - tanning bed use  no - personal history of NMSC    Patient with specific complaint of lesion(s)  Location: back  Duration: years  Symptoms: itchy   Relieving factors/Previous treatments: none         Has had Aks in the past treated with Picato.    Review of Systems   Constitutional:  Negative for fever and chills.   Respiratory:  Negative for cough and shortness of breath.    Gastrointestinal:  Negative for nausea and vomiting.   Musculoskeletal:  Negative for joint swelling and arthralgias.   Skin:  Positive for activity-related sunscreen use. Negative for daily sunscreen use, recent sunburn and wears hat.   All other systems reviewed and are negative.  Hematologic/Lymphatic: Does not bruise/bleed easily.       Objective:   Physical Exam   Constitutional: She appears well-developed and well-nourished. No distress.   Neurological: She is alert and oriented to person, place, and time. She is not disoriented.   Psychiatric: She has a normal mood and affect.   Skin:   Areas Examined (abnormalities noted in diagram):   Scalp / Hair Palpated and Inspected  Head / Face Inspection Performed  Neck Inspection Performed  Chest / Axilla Inspection Performed  Abdomen Inspection Performed  Back Inspection Performed  RUE Inspected  LUE Inspection Performed  Nails and Digits Inspection Performed                     Diagram Legend     Erythematous scaling macule/papule c/w actinic keratosis       Vascular papule c/w angioma      Pigmented verrucoid papule/plaque c/w seborrheic keratosis      Yellow umbilicated papule c/w sebaceous hyperplasia       Irregularly shaped tan macule c/w lentigo     1-2 mm smooth white papules consistent with Milia      Movable subcutaneous cyst with punctum c/w epidermal inclusion cyst      Subcutaneous movable cyst c/w pilar cyst      Firm pink to brown papule c/w dermatofibroma      Pedunculated fleshy papule(s) c/w skin tag(s)      Evenly pigmented macule c/w junctional nevus     Mildly variegated pigmented, slightly irregular-bordered macule c/w mildly atypical nevus      Flesh colored to evenly pigmented papule c/w intradermal nevus       Pink pearly papule/plaque c/w basal cell carcinoma      Erythematous hyperkeratotic cursted plaque c/w SCC      Surgical scar with no sign of skin cancer recurrence      Open and closed comedones      Inflammatory papules and pustules      Verrucoid papule consistent consistent with wart     Erythematous eczematous patches and plaques     Dystrophic onycholytic nail with subungual debris c/w onychomycosis     Umbilicated papule    Erythematous-base heme-crusted tan verrucoid plaque consistent with inflamed seborrheic keratosis     Erythematous Silvery Scaling Plaque c/w Psoriasis     See annotation      Assessment / Plan:        Lentigines  These are benign sun spots which should be monitored for changes. Patient instructed in importance of daily broad spectrum sunscreen use with spf at least 30. Sun avoidance and topical protection/protective clothing discussed.    [place]con angioma  This is a benign vascular lesion. Reassurance given. No treatment required. Treatment of benign, asymptomatic lesions may be considered cosmetic.    SK (seborrheic keratosis)  These are benign inherited growths without a malignant potential. Reassurance given to patient. No treatment is necessary.   Treatment of benign, asymptomatic lesions may be considered cosmetic.  Warned about risk of hypo- or hyperpigmentation with treatment and risk of recurrence.    Dermatofibroma  Reassurance given to patient. No treatment  is necessary.  This is benign scar tissue from previous minor trauma to the skin such as a bug bite.    Sebaceous hyperplasia  This is a benign overgrowth of oil glands. Reassurance given. No treatment required.  Treatment of benign, asymptomatic lesions may be considered cosmetic.    Multiple benign nevi  Benign-appearing nevi present on exam today. Reassurance provided. Periodically examine moles and return to clinic if any moles change or become symptomatic (bleeding, itching, pain, etc).    Skin cancer screening  Upper body skin examination performed today including at least 6 points as noted in physical examination. No lesions suspicious for malignancy noted.  Patient instructed in importance of daily broad spectrum sunscreen use with spf at least 30. Sun avoidance and topical protection/protective clothing discussed.    Follow up in about 1 year (around 9/23/2025) for skin check or sooner for any concerns.

## 2025-04-21 ENCOUNTER — HOSPITAL ENCOUNTER (OUTPATIENT)
Dept: RADIOLOGY | Facility: HOSPITAL | Age: 55
Discharge: HOME | End: 2025-04-21
Attending: PHYSICIAN ASSISTANT
Payer: COMMERCIAL

## 2025-04-21 DIAGNOSIS — Z12.31 ENCOUNTER FOR SCREENING MAMMOGRAM FOR BREAST CANCER: ICD-10-CM

## 2025-04-21 PROCEDURE — 77067 SCR MAMMO BI INCL CAD: CPT | Mod: TC

## 2025-04-21 PROCEDURE — 77067 SCR MAMMO BI INCL CAD: CPT | Mod: 26,,, | Performed by: RADIOLOGY

## 2025-04-21 PROCEDURE — 77063 BREAST TOMOSYNTHESIS BI: CPT | Mod: 26,,, | Performed by: RADIOLOGY

## 2025-05-11 DIAGNOSIS — N95.1 MENOPAUSAL SYNDROME: ICD-10-CM

## 2025-05-12 RX ORDER — PROGESTERONE 100 MG/1
100 CAPSULE ORAL NIGHTLY
Qty: 30 CAPSULE | Refills: 2 | Status: SHIPPED | OUTPATIENT
Start: 2025-05-12

## 2025-06-01 ENCOUNTER — PATIENT MESSAGE (OUTPATIENT)
Dept: OBSTETRICS AND GYNECOLOGY | Facility: CLINIC | Age: 55
End: 2025-06-01
Payer: COMMERCIAL

## 2025-06-03 ENCOUNTER — TELEPHONE (OUTPATIENT)
Dept: OBSTETRICS AND GYNECOLOGY | Facility: CLINIC | Age: 55
End: 2025-06-03
Payer: COMMERCIAL

## 2025-06-03 DIAGNOSIS — N95.0 PMB (POSTMENOPAUSAL BLEEDING): Primary | ICD-10-CM

## 2025-06-06 ENCOUNTER — HOSPITAL ENCOUNTER (OUTPATIENT)
Dept: RADIOLOGY | Facility: HOSPITAL | Age: 55
Discharge: HOME | End: 2025-06-06
Attending: OBSTETRICS & GYNECOLOGY
Payer: COMMERCIAL

## 2025-06-06 DIAGNOSIS — N95.0 PMB (POSTMENOPAUSAL BLEEDING): ICD-10-CM

## 2025-06-06 PROCEDURE — 76856 US EXAM PELVIC COMPLETE: CPT | Mod: 26,,, | Performed by: RADIOLOGY

## 2025-06-06 PROCEDURE — 76830 TRANSVAGINAL US NON-OB: CPT | Mod: TC

## 2025-06-06 PROCEDURE — 76830 TRANSVAGINAL US NON-OB: CPT | Mod: 26,,, | Performed by: RADIOLOGY

## 2025-06-10 ENCOUNTER — PATIENT MESSAGE (OUTPATIENT)
Dept: OBSTETRICS AND GYNECOLOGY | Facility: CLINIC | Age: 55
End: 2025-06-10
Payer: COMMERCIAL

## 2025-06-16 ENCOUNTER — OFFICE VISIT (OUTPATIENT)
Dept: PRIMARY CARE CLINIC | Facility: CLINIC | Age: 55
End: 2025-06-16
Payer: COMMERCIAL

## 2025-06-16 VITALS
WEIGHT: 186.94 LBS | OXYGEN SATURATION: 99 % | HEART RATE: 61 BPM | HEIGHT: 62 IN | BODY MASS INDEX: 34.4 KG/M2 | SYSTOLIC BLOOD PRESSURE: 128 MMHG | RESPIRATION RATE: 16 BRPM | DIASTOLIC BLOOD PRESSURE: 80 MMHG

## 2025-06-16 DIAGNOSIS — N95.1 PERIMENOPAUSE: ICD-10-CM

## 2025-06-16 DIAGNOSIS — Z13.6 ENCOUNTER FOR LIPID SCREENING FOR CARDIOVASCULAR DISEASE: ICD-10-CM

## 2025-06-16 DIAGNOSIS — R07.89 CHEST PRESSURE: ICD-10-CM

## 2025-06-16 DIAGNOSIS — Z00.00 ANNUAL PHYSICAL EXAM: Primary | ICD-10-CM

## 2025-06-16 DIAGNOSIS — Z13.220 ENCOUNTER FOR LIPID SCREENING FOR CARDIOVASCULAR DISEASE: ICD-10-CM

## 2025-06-16 DIAGNOSIS — R07.9 CHEST PAIN, UNSPECIFIED TYPE: ICD-10-CM

## 2025-06-16 DIAGNOSIS — Z12.11 SCREEN FOR COLON CANCER: ICD-10-CM

## 2025-06-16 DIAGNOSIS — Z13.5 SCREENING FOR EYE CONDITION: ICD-10-CM

## 2025-06-16 DIAGNOSIS — L25.3 LATEX ALLERGY, CONTACT DERMATITIS: ICD-10-CM

## 2025-06-16 DIAGNOSIS — Z91.09 ALLERGY TO METAL: ICD-10-CM

## 2025-06-16 DIAGNOSIS — Z13.1 SCREENING FOR DIABETES MELLITUS: ICD-10-CM

## 2025-06-16 PROCEDURE — 3008F BODY MASS INDEX DOCD: CPT | Mod: CPTII,S$GLB,, | Performed by: NURSE PRACTITIONER

## 2025-06-16 PROCEDURE — 1159F MED LIST DOCD IN RCRD: CPT | Mod: CPTII,S$GLB,, | Performed by: NURSE PRACTITIONER

## 2025-06-16 PROCEDURE — 93010 ELECTROCARDIOGRAM REPORT: CPT | Mod: S$GLB,,, | Performed by: INTERNAL MEDICINE

## 2025-06-16 PROCEDURE — 99999 PR PBB SHADOW E&M-EST. PATIENT-LVL IV: CPT | Mod: PBBFAC,,, | Performed by: NURSE PRACTITIONER

## 2025-06-16 PROCEDURE — 99396 PREV VISIT EST AGE 40-64: CPT | Mod: S$GLB,,, | Performed by: NURSE PRACTITIONER

## 2025-06-16 PROCEDURE — 3074F SYST BP LT 130 MM HG: CPT | Mod: CPTII,S$GLB,, | Performed by: NURSE PRACTITIONER

## 2025-06-16 PROCEDURE — 93005 ELECTROCARDIOGRAM TRACING: CPT | Mod: S$GLB,,, | Performed by: NURSE PRACTITIONER

## 2025-06-16 PROCEDURE — 3079F DIAST BP 80-89 MM HG: CPT | Mod: CPTII,S$GLB,, | Performed by: NURSE PRACTITIONER

## 2025-06-16 PROCEDURE — 1160F RVW MEDS BY RX/DR IN RCRD: CPT | Mod: CPTII,S$GLB,, | Performed by: NURSE PRACTITIONER

## 2025-06-16 NOTE — PROGRESS NOTES
[facility] Note    Chief Complaint      Chief Complaint   Patient presents with    Annual Exam    Establish Care     History of Present Illness      Nisha Mei is a 54 y.o. 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 Marjan Bro  Cards Dr. Gabriel    Labs ordered  Eye exam- glasses  Gyn-6/24  Mammogram-4/25  Colonoscopy    Vaccines:  Shingles due      History of Present Illness    CHIEF COMPLAINT:  Nisha presents today as a new patient for annual follow up after previous provider relocated    CARDIAC:  She has a history of cardiac symptoms since 2020, initially presenting with severe back pain and left arm burning sensation without numbness. A second episode occurred last year with heart rate in the 40s. One week ago, she experienced recurrent symptoms including chest pain and burning sensation radiating to her left back, shoulder, and arm, without numbness. Previous cardiac workup including echocardiogram, stress test, and Holter monitor were reportedly normal. Healthcare providers suggested muscular etiology, and she underwent physical therapy and dry needling with temporary symptom relief. She recently started taking aspirin 81mg nightly for symptom management.    GYNECOLOGICAL:  She has free standing fluid in uterus with history of ablation and new ovarian cysts.     ALLERGIES:  She has possible metal allergy with history of blistering at lead sites. She developed latex sensitivity manifesting as facial redness around her mouth and itching after balloon exposure, now using non-latex gloves. She has childhood history of multiple allergies including grass requiring hospitalizations, which she outgrew.    PREVENTIVE CARE:  She completed FIT test for colorectal screening 1-2 years ago instead of colonoscopy. She has a history of shingles. She reports age-related vision changes  "requiring reading glasses for small print.      ROS:  Eyes: +vision changes, +wear glasses or contacts  ENT: +nasal congestion  Cardiovascular: +chest pain, +feelings of slow heart rate  Skin: +rash  Endocrine: +hot flashes  Allergic: +allergic reactions         Health Maintenance   Topic Date Due    Shingles Vaccine (1 of 2) Never done    Pneumococcal Vaccines (Age 50+) (1 of 1 - PCV) Never done    Colorectal Cancer Screening  08/14/2024    COVID-19 Vaccine (3 - 2024-25 season) 09/01/2024    Mammogram  04/21/2026    Hemoglobin A1c (Diabetic Prevention Screening)  11/06/2026    TETANUS VACCINE  10/03/2028    Lipid Panel  11/06/2028    Cervical Cancer Screening  04/17/2029    RSV Vaccine (Age 60+ and Pregnant patients) (1 - 1-dose 75+ series) 11/29/2046    Hepatitis C Screening  Completed    Influenza Vaccine  Completed    HIV Screening  Completed       Past Medical History:   Diagnosis Date    Allergy     History of ovarian cyst        Past Surgical History:   Procedure Laterality Date    DILATION AND CURETTAGE OF UTERUS      x3    Novasure endometrial ablation  12/2017    SKIN BIOPSY  8/21/2020    TUBAL LIGATION Bilateral 11/19/2008       family history includes Diabetes Mellitus in her paternal grandfather; Heart attack in her maternal aunt; Heart attacks under age 50 (age of onset: 40) in her cousin; Heart disease in her mother; Hypertension in her mother; Mitral valve prolapse in her mother.    Social History[1]    Encounter Medications[2]     Review of patient's allergies indicates:  No Known Allergies    Physical Exam      Vital Signs  Pulse: 61  Resp: 16  SpO2: 99 %  BP: 128/80  BP Location: Right arm  Patient Position: Sitting  Height and Weight  Height: 5' 2" (157.5 cm)  Weight: 84.8 kg (186 lb 15.2 oz)  BSA (Calculated - sq m): 1.93 sq meters  BMI (Calculated): 34.2  Weight in (lb) to have BMI = 25: 136.4    Physical Exam  Vitals and nursing note reviewed.   Constitutional:       General: She is not in " acute distress.     Appearance: Normal appearance. She is well-developed. She is not ill-appearing.   HENT:      Head: Normocephalic and atraumatic.      Right Ear: External ear normal.      Left Ear: External ear normal.   Eyes:      Conjunctiva/sclera: Conjunctivae normal.      Pupils: Pupils are equal, round, and reactive to light.   Neck:      Thyroid: No thyromegaly.      Vascular: No JVD.      Trachea: No tracheal deviation.   Cardiovascular:      Rate and Rhythm: Normal rate and regular rhythm.      Heart sounds: Normal heart sounds. No murmur heard.  Pulmonary:      Effort: Pulmonary effort is normal.      Breath sounds: Normal breath sounds.   Chest:      Chest wall: No tenderness.   Abdominal:      General: Bowel sounds are normal.      Palpations: Abdomen is soft.      Tenderness: There is no abdominal tenderness. There is no guarding.   Musculoskeletal:         General: Normal range of motion.      Cervical back: Normal range of motion and neck supple.   Lymphadenopathy:      Cervical: No cervical adenopathy.   Skin:     General: Skin is warm and dry.   Neurological:      General: No focal deficit present.      Mental Status: She is alert and oriented to person, place, and time.   Psychiatric:         Mood and Affect: Mood normal.         Behavior: Behavior normal.         Thought Content: Thought content normal.         Judgment: Judgment normal.          Laboratory:  CBC:  Lab Results   Component Value Date    WBC 4.79 11/06/2023    RBC 4.53 11/06/2023    HGB 13.6 11/06/2023    HCT 41.0 11/06/2023     11/06/2023    MCV 91 11/06/2023    MCH 30.0 11/06/2023    MCHC 33.2 11/06/2023    MCHC 32.7 11/09/2022    MCHC 34.9 11/11/2021     CMP:  Lab Results   Component Value Date    GLU 92 11/06/2023    CALCIUM 9.7 11/06/2023    ALBUMIN 4.1 11/06/2023    PROT 7.2 11/06/2023     11/06/2023    K 4.7 11/06/2023    CO2 24 11/06/2023     11/06/2023    BUN 9 11/06/2023    ALKPHOS 57 11/06/2023     ALT 15 11/06/2023    AST 18 11/06/2023    BILITOT 0.8 11/06/2023    BILITOT 0.6 11/09/2022    BILITOT 1.2 (H) 11/11/2021     URINALYSIS:  Lab Results   Component Value Date    COLORU YELLOW 04/08/2008    SPECGRAV 1.029 04/08/2008    PHUR 5.0 04/08/2008    PROTEINUA NEG 04/08/2008    BACTERIA FEW 04/08/2008    NITRITE NEG 04/08/2008    LEUKOCYTESUR TR (A) 04/08/2008    UROBILINOGEN 0.2 04/08/2008      LIPIDS:  Lab Results   Component Value Date    TSH 1.676 11/06/2023    TSH 1.151 11/09/2022    TSH 1.392 11/11/2021    HDL 63 11/06/2023    HDL 78 (H) 11/09/2022    HDL 66 11/11/2021    CHOL 207 (H) 11/06/2023    CHOL 213 (H) 11/09/2022    CHOL 188 11/11/2021    TRIG 84 11/06/2023    TRIG 106 11/09/2022    TRIG 69 11/11/2021    LDLCALC 127.2 11/06/2023    LDLCALC 113.8 11/09/2022    LDLCALC 108.2 11/11/2021    CHOLHDL 30.4 11/06/2023    CHOLHDL 36.6 11/09/2022    CHOLHDL 35.1 11/11/2021    NONHDLCHOL 144 11/06/2023    NONHDLCHOL 135 11/09/2022    NONHDLCHOL 122 11/11/2021    TOTALCHOLEST 3.3 11/06/2023    TOTALCHOLEST 2.7 11/09/2022    TOTALCHOLEST 2.8 11/11/2021     TSH:  Lab Results   Component Value Date    TSH 1.676 11/06/2023    TSH 1.151 11/09/2022    TSH 1.392 11/11/2021     A1C:  Lab Results   Component Value Date    HGBA1C 4.7 11/06/2023    HGBA1C 4.8 11/09/2022    HGBA1C 4.8 11/11/2021    HGBA1C 4.9 10/10/2018         Assessment/Plan     Nisha Mei is a 54 y.o.female with:    Assessment & Plan    ## CHEST PAIN:  - Monitored the patient's burning pain through the back and down the left arm, sometimes occurring at rest, not associated with numbness.  - Previous cardiac assessments including echocardiogram, stress test, and Holter monitor were all normal.  - Ordered in-office EKG to reassess cardiac status despite history of negative cardiac workup.  - Assessed possibility of cardiac versus muscular origin of chest pain.  - Noted patient takes 81 mg aspirin at night as a precautionary measure.  -  Instructed patient to report any changes in symptoms or worsening of chest pain.    ## BRADYCARDIA:  - Monitored patient's report of heart rate in the 40s last year, which was checked while at work and was not normal for the patient.  - Evaluated bradycardia symptoms in context of past cardiac evaluations.  - Ordered EKG to further evaluate bradycardia and determine if intervention is necessary.  - Discussed potential causes and implications with the patient.    ## UTERINE DISORDERS:  - Monitored free-standing fluid in the uterus following previous ablation and evaluated patient's report of occasional pain potentially related to this condition.    ## METAL ALLERGY:  - Monitored patient's report of blistering where Holter monitor leads were placed and from wearing necklaces, indicating potential metal allergy.  - Referred patient to an allergy specialist for evaluation.  - Advised patient to avoid known metal triggers until specialist evaluation is complete.    ## LATEX ALLERGY:  - Monitored patient's report of redness and itchiness after blowing up balloons and exposure to latex products, suggesting latex allergy with reactions consistent with allergic urticaria.  - Referred patient to an allergy specialist for evaluation.  - Advised patient to avoid latex products and inform healthcare providers about this potential allergy.        Annual physical exam  -     CBC Auto Differential; Future; Expected date: 06/16/2025  -     Comprehensive Metabolic Panel; Future; Expected date: 06/16/2025  -     Hemoglobin A1C; Future; Expected date: 06/16/2025  -     TSH; Future; Expected date: 06/16/2025  -     Lipid Panel; Future; Expected date: 06/16/2025    Chest pain, unspecified type  -     CBC Auto Differential; Future; Expected date: 06/16/2025  -     Comprehensive Metabolic Panel; Future; Expected date: 06/16/2025  -     Hemoglobin A1C; Future; Expected date: 06/16/2025  -     TSH; Future; Expected date: 06/16/2025  -      Lipid Panel; Future; Expected date: 06/16/2025  -     IN OFFICE EKG 12-LEAD (to Muse)    Chest pressure  -     CBC Auto Differential; Future; Expected date: 06/16/2025  -     Comprehensive Metabolic Panel; Future; Expected date: 06/16/2025  -     Hemoglobin A1C; Future; Expected date: 06/16/2025  -     TSH; Future; Expected date: 06/16/2025  -     Lipid Panel; Future; Expected date: 06/16/2025  -     IN OFFICE EKG 12-LEAD (to Muse)    Perimenopause  -     CBC Auto Differential; Future; Expected date: 06/16/2025  -     Comprehensive Metabolic Panel; Future; Expected date: 06/16/2025  -     Hemoglobin A1C; Future; Expected date: 06/16/2025  -     TSH; Future; Expected date: 06/16/2025  -     Lipid Panel; Future; Expected date: 06/16/2025  -     Estradiol; Future; Expected date: 06/16/2025  -     Follicle Stimulating Hormone; Future; Expected date: 06/16/2025    Latex allergy, contact dermatitis  -     Ambulatory referral/consult to Allergy; Future; Expected date: 06/16/2025    Allergy to metal  -     CBC Auto Differential; Future; Expected date: 06/16/2025  -     Comprehensive Metabolic Panel; Future; Expected date: 06/16/2025  -     Hemoglobin A1C; Future; Expected date: 06/16/2025  -     TSH; Future; Expected date: 06/16/2025  -     Lipid Panel; Future; Expected date: 06/16/2025  -     Ambulatory referral/consult to Allergy; Future; Expected date: 06/16/2025    Screening for diabetes mellitus  -     CBC Auto Differential; Future; Expected date: 06/16/2025  -     Comprehensive Metabolic Panel; Future; Expected date: 06/16/2025  -     Hemoglobin A1C; Future; Expected date: 06/16/2025  -     TSH; Future; Expected date: 06/16/2025  -     Lipid Panel; Future; Expected date: 06/16/2025    Encounter for lipid screening for cardiovascular disease  -     CBC Auto Differential; Future; Expected date: 06/16/2025  -     Comprehensive Metabolic Panel; Future; Expected date: 06/16/2025  -     Hemoglobin A1C; Future; Expected  date: 06/16/2025  -     TSH; Future; Expected date: 06/16/2025  -     Lipid Panel; Future; Expected date: 06/16/2025    Screen for colon cancer  -     Cologuard Screening (Multitarget Stool DNA); Future; Expected date: 06/16/2025    Screening for eye condition  -     Ambulatory referral/consult to Optometry; Future; Expected date: 06/16/2025          Health Maintenance Due   Topic Date Due    Shingles Vaccine (1 of 2) Never done    Pneumococcal Vaccines (Age 50+) (1 of 1 - PCV) Never done    Colorectal Cancer Screening  08/14/2024    COVID-19 Vaccine (3 - 2024-25 season) 09/01/2024        I spent 36 minutes on the day of this encounter for preparing for, evaluating, treating, and managing this patient.      -Continue current medications and maintain follow up with specialists.  Return to clinic in 1 year sooner for any concerns No follow-ups on file.     This note was generated with the assistance of ambient listening technology. Verbal consent was obtained by the patient and accompanying visitor(s) for the recording of patient appointment to facilitate this note. I attest to having reviewed and edited the generated note for accuracy, though some syntax or spelling errors may persist. Please contact the author of this note for any clarification.        TERI Peres  [facility] location                         [1]   Social History  Tobacco Use    Smoking status: Never    Smokeless tobacco: Never   Substance Use Topics    Alcohol use: Not Currently     Comment: Once a month if that    Drug use: No   [2]   Outpatient Encounter Medications as of 6/16/2025   Medication Sig Dispense Refill    [DISCONTINUED] progesterone (PROMETRIUM) 100 MG capsule Take 1 capsule (100 mg total) by mouth nightly. (Patient not taking: Reported on 6/16/2025) 30 capsule 2     No facility-administered encounter medications on file as of 6/16/2025.

## 2025-06-17 ENCOUNTER — PATIENT MESSAGE (OUTPATIENT)
Dept: PRIMARY CARE CLINIC | Facility: CLINIC | Age: 55
End: 2025-06-17
Payer: COMMERCIAL

## 2025-06-17 ENCOUNTER — RESULTS FOLLOW-UP (OUTPATIENT)
Dept: PRIMARY CARE CLINIC | Facility: CLINIC | Age: 55
End: 2025-06-17

## 2025-06-17 ENCOUNTER — LAB VISIT (OUTPATIENT)
Dept: LAB | Facility: HOSPITAL | Age: 55
End: 2025-06-17
Attending: NURSE PRACTITIONER
Payer: COMMERCIAL

## 2025-06-17 DIAGNOSIS — R07.89 CHEST PRESSURE: ICD-10-CM

## 2025-06-17 DIAGNOSIS — R07.9 CHEST PAIN, UNSPECIFIED TYPE: ICD-10-CM

## 2025-06-17 DIAGNOSIS — Z91.09 ALLERGY TO METAL: ICD-10-CM

## 2025-06-17 DIAGNOSIS — Z13.1 SCREENING FOR DIABETES MELLITUS: ICD-10-CM

## 2025-06-17 DIAGNOSIS — N95.1 PERIMENOPAUSE: ICD-10-CM

## 2025-06-17 DIAGNOSIS — Z00.00 ANNUAL PHYSICAL EXAM: ICD-10-CM

## 2025-06-17 DIAGNOSIS — Z13.220 ENCOUNTER FOR LIPID SCREENING FOR CARDIOVASCULAR DISEASE: ICD-10-CM

## 2025-06-17 DIAGNOSIS — Z13.6 ENCOUNTER FOR LIPID SCREENING FOR CARDIOVASCULAR DISEASE: ICD-10-CM

## 2025-06-17 LAB
ABSOLUTE EOSINOPHIL (OHS): 0.28 K/UL
ABSOLUTE MONOCYTE (OHS): 0.43 K/UL (ref 0.3–1)
ABSOLUTE NEUTROPHIL COUNT (OHS): 2.6 K/UL (ref 1.8–7.7)
ALBUMIN SERPL BCP-MCNC: 3.8 G/DL (ref 3.5–5.2)
ALP SERPL-CCNC: 60 UNIT/L (ref 40–150)
ALT SERPL W/O P-5'-P-CCNC: 19 UNIT/L (ref 10–44)
ANION GAP (OHS): 5 MMOL/L (ref 8–16)
AST SERPL-CCNC: 17 UNIT/L (ref 11–45)
BASOPHILS # BLD AUTO: 0.03 K/UL
BASOPHILS NFR BLD AUTO: 0.6 %
BILIRUB SERPL-MCNC: 0.8 MG/DL (ref 0.1–1)
BUN SERPL-MCNC: 11 MG/DL (ref 6–20)
CALCIUM SERPL-MCNC: 9.2 MG/DL (ref 8.7–10.5)
CHLORIDE SERPL-SCNC: 108 MMOL/L (ref 95–110)
CHOLEST SERPL-MCNC: 199 MG/DL (ref 120–199)
CHOLEST/HDLC SERPL: 2.8 {RATIO} (ref 2–5)
CO2 SERPL-SCNC: 26 MMOL/L (ref 23–29)
CREAT SERPL-MCNC: 0.8 MG/DL (ref 0.5–1.4)
EAG (OHS): 97 MG/DL (ref 68–131)
ERYTHROCYTE [DISTWIDTH] IN BLOOD BY AUTOMATED COUNT: 12.5 % (ref 11.5–14.5)
ESTRADIOL SERPL HS-MCNC: 122 PG/ML
FSH SERPL-ACNC: 22.97 MIU/ML
GFR SERPLBLD CREATININE-BSD FMLA CKD-EPI: >60 ML/MIN/1.73/M2
GLUCOSE SERPL-MCNC: 96 MG/DL (ref 70–110)
HBA1C MFR BLD: 5 % (ref 4–5.6)
HCT VFR BLD AUTO: 39.1 % (ref 37–48.5)
HDLC SERPL-MCNC: 70 MG/DL (ref 40–75)
HDLC SERPL: 35.2 % (ref 20–50)
HGB BLD-MCNC: 13.3 GM/DL (ref 12–16)
IMM GRANULOCYTES # BLD AUTO: 0.01 K/UL (ref 0–0.04)
IMM GRANULOCYTES NFR BLD AUTO: 0.2 % (ref 0–0.5)
LDLC SERPL CALC-MCNC: 111.8 MG/DL (ref 63–159)
LYMPHOCYTES # BLD AUTO: 2.08 K/UL (ref 1–4.8)
MCH RBC QN AUTO: 30 PG (ref 27–31)
MCHC RBC AUTO-ENTMCNC: 34 G/DL (ref 32–36)
MCV RBC AUTO: 88 FL (ref 82–98)
NONHDLC SERPL-MCNC: 129 MG/DL
NUCLEATED RBC (/100WBC) (OHS): 0 /100 WBC
PLATELET # BLD AUTO: 331 K/UL (ref 150–450)
PMV BLD AUTO: 10.5 FL (ref 9.2–12.9)
POTASSIUM SERPL-SCNC: 4.4 MMOL/L (ref 3.5–5.1)
PROT SERPL-MCNC: 7.2 GM/DL (ref 6–8.4)
RBC # BLD AUTO: 4.44 M/UL (ref 4–5.4)
RELATIVE EOSINOPHIL (OHS): 5.2 %
RELATIVE LYMPHOCYTE (OHS): 38.3 % (ref 18–48)
RELATIVE MONOCYTE (OHS): 7.9 % (ref 4–15)
RELATIVE NEUTROPHIL (OHS): 47.8 % (ref 38–73)
SODIUM SERPL-SCNC: 139 MMOL/L (ref 136–145)
TRIGL SERPL-MCNC: 86 MG/DL (ref 30–150)
TSH SERPL-ACNC: 1.32 UIU/ML (ref 0.4–4)
WBC # BLD AUTO: 5.43 K/UL (ref 3.9–12.7)

## 2025-06-17 PROCEDURE — 83001 ASSAY OF GONADOTROPIN (FSH): CPT

## 2025-06-17 PROCEDURE — 36415 COLL VENOUS BLD VENIPUNCTURE: CPT

## 2025-06-17 PROCEDURE — 84443 ASSAY THYROID STIM HORMONE: CPT

## 2025-06-17 PROCEDURE — 82247 BILIRUBIN TOTAL: CPT

## 2025-06-17 PROCEDURE — 85025 COMPLETE CBC W/AUTO DIFF WBC: CPT

## 2025-06-17 PROCEDURE — 82670 ASSAY OF TOTAL ESTRADIOL: CPT

## 2025-06-17 PROCEDURE — 83036 HEMOGLOBIN GLYCOSYLATED A1C: CPT

## 2025-06-17 PROCEDURE — 83718 ASSAY OF LIPOPROTEIN: CPT

## 2025-06-18 LAB
OHS QRS DURATION: 76 MS
OHS QTC CALCULATION: 397 MS

## 2025-06-20 ENCOUNTER — RESULTS FOLLOW-UP (OUTPATIENT)
Dept: OBSTETRICS AND GYNECOLOGY | Facility: CLINIC | Age: 55
End: 2025-06-20

## 2025-06-25 ENCOUNTER — OFFICE VISIT (OUTPATIENT)
Facility: CLINIC | Age: 55
End: 2025-06-25
Attending: OBSTETRICS & GYNECOLOGY
Payer: COMMERCIAL

## 2025-06-25 VITALS
DIASTOLIC BLOOD PRESSURE: 85 MMHG | HEART RATE: 76 BPM | HEIGHT: 62 IN | SYSTOLIC BLOOD PRESSURE: 133 MMHG | WEIGHT: 188.06 LBS | BODY MASS INDEX: 34.61 KG/M2

## 2025-06-25 DIAGNOSIS — N88.2 CERVICAL STENOSIS (UTERINE CERVIX): ICD-10-CM

## 2025-06-25 DIAGNOSIS — N92.4 ABNORMAL PERIMENOPAUSAL BLEEDING: Primary | ICD-10-CM

## 2025-06-25 PROCEDURE — 3075F SYST BP GE 130 - 139MM HG: CPT | Mod: CPTII,S$GLB,, | Performed by: OBSTETRICS & GYNECOLOGY

## 2025-06-25 PROCEDURE — 1160F RVW MEDS BY RX/DR IN RCRD: CPT | Mod: CPTII,S$GLB,, | Performed by: OBSTETRICS & GYNECOLOGY

## 2025-06-25 PROCEDURE — 99999 PR PBB SHADOW E&M-EST. PATIENT-LVL III: CPT | Mod: PBBFAC,,, | Performed by: OBSTETRICS & GYNECOLOGY

## 2025-06-25 PROCEDURE — 99213 OFFICE O/P EST LOW 20 MIN: CPT | Mod: S$GLB,,, | Performed by: OBSTETRICS & GYNECOLOGY

## 2025-06-25 PROCEDURE — 3079F DIAST BP 80-89 MM HG: CPT | Mod: CPTII,S$GLB,, | Performed by: OBSTETRICS & GYNECOLOGY

## 2025-06-25 PROCEDURE — 3044F HG A1C LEVEL LT 7.0%: CPT | Mod: CPTII,S$GLB,, | Performed by: OBSTETRICS & GYNECOLOGY

## 2025-06-25 PROCEDURE — 3008F BODY MASS INDEX DOCD: CPT | Mod: CPTII,S$GLB,, | Performed by: OBSTETRICS & GYNECOLOGY

## 2025-06-25 PROCEDURE — 1159F MED LIST DOCD IN RCRD: CPT | Mod: CPTII,S$GLB,, | Performed by: OBSTETRICS & GYNECOLOGY

## 2025-06-25 RX ORDER — PROGESTERONE 100 MG/1
100 CAPSULE ORAL DAILY
COMMUNITY

## 2025-06-25 RX ORDER — MISOPROSTOL 200 UG/1
200 TABLET ORAL EVERY 12 HOURS
Qty: 2 TABLET | Refills: 0 | Status: SHIPPED | OUTPATIENT
Start: 2025-06-25

## 2025-06-25 NOTE — PROGRESS NOTES
Subjective:       Patient ID: Nisha Mei is a 54 y.o. female.    Chief Complaint:  discuss u/s results      History of Present Illness  HPI  This 55 y/o P3 presents today with complaints of vaginal bleeding which occurred around June 1 which first occurred during intercourse. She had previously had spotting when wiped about 2 weeks earlier. She had an ablation in December 2017 and had only light sporadic bleeding since then. Began having vasomotor symptoms last year, prometrium was ordered, but not taken until about 3 weeks ago when night sweats became intolerable. (Has had complete relief of vasomotor symptoms since starting the progesterone).  Has never gone a full year without a period, but they had spaced out to every few months.  Recent labs: FSH:22.97, Previously 4-17-2-024: 39; Estradiol 122,  Previously on 4-:10      GYN & OB History  No LMP recorded. Patient has had an ablation.   Date of Last Pap: 4/24/2024  U/S: June 6,2025:TECHNIQUE:  Transabdominal sonography of the pelvis was performed, followed by transvaginal sonography to better evaluate the uterus and ovaries.     COMPARISON:  Pelvic ultrasound dated 12/15/2021     FINDINGS:  Uterus:     Size: 7.3 x 4.0 x 5.2 cm     Masses: Myometrial heterogeneity.     Endometrium: Reported history of prior ablation.  Suboptimal visualization of the endometrial stripe which is largely obscured.  Noted anechoic fluid structure measuring 1.7 x 0.9 x 1.0 cm, potentially intraluminal versus subendometrial in nature.  No associated vascularity.     Right ovary:     Not well visualized.     Left ovary:     Left adnexal Anechoic cystic appearing structure measuring 1.5 x 1.4 x 1.4 cm.  No suspicious internal vascularity.  Left ovary is otherwise not confidently identified.     Free Fluid:     Small volume right adnexal free fluid.     Impression:     Patient with reported history of endometrial ablation.  Intrauterine fluid structure measuring up to  "1.7 cm.  Further anatomic localization with relation to the endometrial stripe otherwise difficult with myometrial heterogeneity.  Further correlation and/or alternative imaging assessment such as MRI as warranted.     Cystic appearing structure at the left adnexa measuring up to 1.5 cm (with otherwise suboptimal visualization of the ovary).     Nonvisualized right ovary with small volume right adnexal free fluid.    OB History    Para Term  AB Living   6 3 3  3 1   SAB IAB Ectopic Multiple Live Births   3    3      # Outcome Date GA Lbr Silvestre/2nd Weight Sex Type Anes PTL Lv   6 Term      Vag-Spont  N LEIGH   5 Term      Vag-Spont  N LEIGH   4 Term      Vag-Spont  N LEIGH   3 SAB            2 SAB            1 SAB                Past Medical History:   Diagnosis Date    Allergy     History of ovarian cyst        Past Surgical History:   Procedure Laterality Date    DILATION AND CURETTAGE OF UTERUS      x3    Novasure endometrial ablation  2017    SKIN BIOPSY  2020    TUBAL LIGATION Bilateral 2008       Review of Systems  Review of Systems   Genitourinary:  Positive for hot flashes, postcoital bleeding and postmenopausal bleeding.           Objective:      /85 (Patient Position: Sitting)   Pulse 76   Ht 5' 2" (1.575 m)   Wt 85.3 kg (188 lb 0.8 oz)   BMI 34.40 kg/m²   Physical Exam         Assessment:        1. Abnormal perimenopausal bleeding    2. Cervical stenosis (uterine cervix)                Plan:      Problem List Items Addressed This Visit    None  Visit Diagnoses         Abnormal perimenopausal bleeding    -  Primary  Long discussion with patient regarding Cystic structure within endometrium, need for sampling to rule out hyperplasia. ALso advised that may be difficult to get adequate sample in office, and may subsequently need to have Hysteroscopy for further evaluation if insufficient.   Will plan on paracervical block.    Relevant Medications    miSOPROStoL (CYTOTEC) 200 MCG " Tab      Cervical stenosis (uterine cervix)        Relevant Medications    miSOPROStoL (CYTOTEC) 200 MCG Tab             Follow up if symptoms worsen or fail to improve.

## 2025-07-02 ENCOUNTER — PATIENT MESSAGE (OUTPATIENT)
Dept: PRIMARY CARE CLINIC | Facility: CLINIC | Age: 55
End: 2025-07-02
Payer: COMMERCIAL

## 2025-07-02 DIAGNOSIS — J02.9 PHARYNGITIS, UNSPECIFIED ETIOLOGY: Primary | ICD-10-CM

## 2025-07-02 NOTE — TELEPHONE ENCOUNTER
LOV 6/17/25    Pt is requesting: zpack for sore throat and fluid in my ears. Is that some need an appointment for or could you just send a script to my pharmacy?

## 2025-07-03 RX ORDER — AZITHROMYCIN 250 MG/1
500 TABLET, FILM COATED ORAL DAILY
Qty: 6 TABLET | Refills: 0 | Status: SHIPPED | OUTPATIENT
Start: 2025-07-03 | End: 2025-07-08

## 2025-07-08 ENCOUNTER — PATIENT MESSAGE (OUTPATIENT)
Facility: CLINIC | Age: 55
End: 2025-07-08
Payer: COMMERCIAL

## 2025-07-09 DIAGNOSIS — N95.0 POSTMENOPAUSAL BLEEDING: Primary | ICD-10-CM

## 2025-07-09 RX ORDER — PROGESTERONE 100 MG/1
200 CAPSULE ORAL NIGHTLY
Qty: 60 CAPSULE | Refills: 6 | Status: SHIPPED | OUTPATIENT
Start: 2025-07-09

## 2025-08-05 ENCOUNTER — TELEPHONE (OUTPATIENT)
Dept: ALLERGY | Facility: CLINIC | Age: 55
End: 2025-08-05
Payer: COMMERCIAL

## 2025-08-05 NOTE — TELEPHONE ENCOUNTER
Several attempted to call patient.  Left a voice and a portal message to reschedule  appointment with Dr West on 08/11/25.

## 2025-08-07 ENCOUNTER — PATIENT MESSAGE (OUTPATIENT)
Facility: CLINIC | Age: 55
End: 2025-08-07
Payer: COMMERCIAL

## 2025-08-08 ENCOUNTER — OFFICE VISIT (OUTPATIENT)
Dept: OBSTETRICS AND GYNECOLOGY | Facility: CLINIC | Age: 55
End: 2025-08-08
Attending: OBSTETRICS & GYNECOLOGY
Payer: COMMERCIAL

## 2025-08-08 VITALS
DIASTOLIC BLOOD PRESSURE: 78 MMHG | WEIGHT: 188.38 LBS | HEIGHT: 62 IN | BODY MASS INDEX: 34.67 KG/M2 | HEART RATE: 67 BPM | SYSTOLIC BLOOD PRESSURE: 128 MMHG

## 2025-08-08 DIAGNOSIS — N95.0 POSTMENOPAUSAL BLEEDING: ICD-10-CM

## 2025-08-08 DIAGNOSIS — N92.4 ABNORMAL PERIMENOPAUSAL BLEEDING: Primary | ICD-10-CM

## 2025-08-08 PROCEDURE — 99999 PR PBB SHADOW E&M-EST. PATIENT-LVL III: CPT | Mod: PBBFAC,,, | Performed by: OBSTETRICS & GYNECOLOGY

## 2025-08-08 NOTE — PROCEDURES
Endometrial biopsy    Date/Time: 8/8/2025 2:00 PM    Performed by: Kathleen Phillip MD  Authorized by: Kathleen Phillip MD    Consent:     Prior to procedure the appropriate consent was completed and verified      Consent given by:  Patient    Patient questions answered: yes      Patient agrees, verbalizes understanding, and wants to proceed: yes      Educational handouts given: yes      Instructions and paperwork completed: yes    Indication:     Indications: Other disorder of menstruation and other abnormal bleeding from female genital tract    Pre-procedure:     Pre-procedure timeout performed: yes    Procedure:     Procedure: endometrial biopsy with Pipelle      Cervix cleaned and prepped: yes      A paracervical block was performed: yes      An intracervical block was performed: yes      The cervix was dilated using cervical dilators: no      Use of single-tooth tenaculum: yes      Uterus sounded: yes (4 cm. Internal os noted to be stenotic, unable to advance beyond internal os)      Uterus sound depth (cm): 4cm.  unable to advance past internal os.    Specimen collected: insufficient sample      Patient tolerated procedure well with no complications: yes      Unable to perform due to: cervical stenosis    Comments:     Procedure comments:  Patient with history of ENdometrial ablation. Unable to pass pipelle/sound past internal os.

## 2025-08-08 NOTE — PROGRESS NOTES
Subjective:       Patient ID: Nisha Mei is a 54 y.o. female.    Chief Complaint:  Procedure      History of Present Illness  HPI  This 53 y/o P3 presents today for evaluation  of perimenopausal bleeding. Had an endometrial ablation in December 2017 and still having light very sporadic episodes of spotting.Began having vasomotor symptoms last year, prometrium was ordered, but not taken until about 3 weeks ago when night sweats became intolerable. (Has had complete relief of vasomotor symptoms since starting the progesterone).  Has never gone a full year without a period, but they had spaced out to every few months.  Recent labs: FSH:22.97, Previously 4-17-2-024: 39; Estradiol 122,  Previously on 4-:10      Ultrasound: 6-6-2025:EXAMINATION:  US PELVIS COMP WITH TRANSVAG NON-OB (XPD)     CLINICAL HISTORY:  Postmenopausal bleeding     TECHNIQUE:  Transabdominal sonography of the pelvis was performed, followed by transvaginal sonography to better evaluate the uterus and ovaries.     COMPARISON:  Pelvic ultrasound dated 12/15/2021     FINDINGS:  Uterus:     Size: 7.3 x 4.0 x 5.2 cm     Masses: Myometrial heterogeneity.     Endometrium: Reported history of prior ablation.  Suboptimal visualization of the endometrial stripe which is largely obscured.  Noted anechoic fluid structure measuring 1.7 x 0.9 x 1.0 cm, potentially intraluminal versus subendometrial in nature.  No associated vascularity.     Right ovary:     Not well visualized.     Left ovary:     Left adnexal Anechoic cystic appearing structure measuring 1.5 x 1.4 x 1.4 cm.  No suspicious internal vascularity.  Left ovary is otherwise not confidently identified.     Free Fluid:     Small volume right adnexal free fluid.     Impression:     Patient with reported history of endometrial ablation.  Intrauterine fluid structure measuring up to 1.7 cm.  Further anatomic localization with relation to the endometrial stripe otherwise difficult with  myometrial heterogeneity.  Further correlation and/or alternative imaging assessment such as MRI as warranted.     Cystic appearing structure at the left adnexa measuring up to 1.5 cm (with otherwise suboptimal visualization of the ovary).     Nonvisualized right ovary with small volume right adnexal free fluid.    GYN & OB History  No LMP recorded. Patient has had an ablation.   Date of Last Pap: 2024    OB History    Para Term  AB Living   6 3 3  3 3   SAB IAB Ectopic Multiple Live Births   3    3      # Outcome Date GA Lbr Silvestre/2nd Weight Sex Type Anes PTL Lv   6 Term      Vag-Spont  N LEIGH   5 Term      Vag-Spont  N LEIGH   4 Term      Vag-Spont  N LEIGH   3 SAB            2 SAB            1 SAB                Past Medical History:   Diagnosis Date    Allergy     History of ovarian cyst        Past Surgical History:   Procedure Laterality Date    DILATION AND CURETTAGE OF UTERUS      x3    Novasure endometrial ablation  2017    SKIN BIOPSY  2020    TUBAL LIGATION Bilateral 2008       Review of Systems  Review of Systems   Constitutional:  Negative for activity change, appetite change, fatigue and unexpected weight change.   HENT: Negative.     Eyes:  Negative for visual disturbance.   Respiratory:  Negative for shortness of breath and wheezing.    Cardiovascular:  Negative for chest pain, palpitations and leg swelling.   Gastrointestinal:  Negative for abdominal pain, bloating and blood in stool.   Endocrine: Negative for diabetes and hair loss.   Genitourinary:  Positive for hot flashes and menstrual problem. Negative for decreased libido and dyspareunia.   Musculoskeletal:  Negative for back pain and joint swelling.   Integumentary:  Negative for acne, hair changes and nipple discharge.   Neurological:  Negative for headaches.   Hematological:  Does not bruise/bleed easily.   Psychiatric/Behavioral:  Negative for depression and sleep disturbance. The patient is not nervous/anxious.   "  Breast: Negative for mastodynia and nipple discharge          Objective:      /78 (Patient Position: Sitting)   Pulse 67   Ht 5' 2" (1.575 m)   Wt 85.5 kg (188 lb 6.4 oz)   BMI 34.46 kg/m²   Physical Exam:               Genitourinary:    Pelvic exam was performed with patient supine.      Genitourinary Comments: PELVIC: Normal external genitalia without lesions.  Normal hair distribution.  Adequate perineal body, normal urethral meatus.  Vagina moist and well rugated without lesions or discharge.  Cervix pink, Parous appearing, without lesions, discharge or tenderness.  No significant cystocele or rectocele.  Bimanual exam shows uterus to be normal size, regular, mobile and nontender.  Adnexa without masses or tenderness.                                  Assessment:        1. Abnormal perimenopausal bleeding    2. 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 Will monitor, but since labs indicate just perimenopause and not currently on Estrogen based HRT, not a high risk of hyperplasia.   Will increase prometrium to 200mg HS and monitor.     Follow up if symptoms worsen or fail to improve.         "

## 2025-08-27 ENCOUNTER — OFFICE VISIT (OUTPATIENT)
Facility: CLINIC | Age: 55
End: 2025-08-27
Attending: OBSTETRICS & GYNECOLOGY
Payer: COMMERCIAL

## 2025-08-27 VITALS
HEIGHT: 62 IN | HEART RATE: 65 BPM | BODY MASS INDEX: 34.24 KG/M2 | SYSTOLIC BLOOD PRESSURE: 139 MMHG | WEIGHT: 186.06 LBS | DIASTOLIC BLOOD PRESSURE: 88 MMHG

## 2025-08-27 DIAGNOSIS — Z01.419 ENCOUNTER FOR GYNECOLOGICAL EXAMINATION WITHOUT ABNORMAL FINDING: Primary | ICD-10-CM

## 2025-08-27 DIAGNOSIS — N95.1 PERIMENOPAUSAL VASOMOTOR SYMPTOMS: ICD-10-CM

## 2025-08-27 PROCEDURE — 3008F BODY MASS INDEX DOCD: CPT | Mod: CPTII,S$GLB,, | Performed by: OBSTETRICS & GYNECOLOGY

## 2025-08-27 PROCEDURE — 3075F SYST BP GE 130 - 139MM HG: CPT | Mod: CPTII,S$GLB,, | Performed by: OBSTETRICS & GYNECOLOGY

## 2025-08-27 PROCEDURE — 3044F HG A1C LEVEL LT 7.0%: CPT | Mod: CPTII,S$GLB,, | Performed by: OBSTETRICS & GYNECOLOGY

## 2025-08-27 PROCEDURE — 99999 PR PBB SHADOW E&M-EST. PATIENT-LVL III: CPT | Mod: PBBFAC,,, | Performed by: OBSTETRICS & GYNECOLOGY

## 2025-08-27 PROCEDURE — 3079F DIAST BP 80-89 MM HG: CPT | Mod: CPTII,S$GLB,, | Performed by: OBSTETRICS & GYNECOLOGY

## 2025-08-27 PROCEDURE — 1160F RVW MEDS BY RX/DR IN RCRD: CPT | Mod: CPTII,S$GLB,, | Performed by: OBSTETRICS & GYNECOLOGY

## 2025-08-27 PROCEDURE — 99396 PREV VISIT EST AGE 40-64: CPT | Mod: S$GLB,,, | Performed by: OBSTETRICS & GYNECOLOGY

## 2025-08-27 PROCEDURE — 1159F MED LIST DOCD IN RCRD: CPT | Mod: CPTII,S$GLB,, | Performed by: OBSTETRICS & GYNECOLOGY

## 2025-08-27 RX ORDER — PROGESTERONE 100 MG/1
200 CAPSULE ORAL NIGHTLY
Qty: 60 CAPSULE | Refills: 12 | Status: SHIPPED | OUTPATIENT
Start: 2025-08-27

## 2025-09-01 ENCOUNTER — PATIENT MESSAGE (OUTPATIENT)
Dept: PRIMARY CARE CLINIC | Facility: CLINIC | Age: 55
End: 2025-09-01
Payer: COMMERCIAL

## 2025-09-01 DIAGNOSIS — R07.89 CHEST PRESSURE: ICD-10-CM

## 2025-09-01 DIAGNOSIS — R07.9 CHEST PAIN, UNSPECIFIED TYPE: ICD-10-CM

## 2025-09-01 DIAGNOSIS — N95.1 PERIMENOPAUSE: ICD-10-CM

## 2025-09-01 DIAGNOSIS — Z13.220 ENCOUNTER FOR LIPID SCREENING FOR CARDIOVASCULAR DISEASE: ICD-10-CM

## 2025-09-01 DIAGNOSIS — Z00.00 ANNUAL PHYSICAL EXAM: ICD-10-CM

## 2025-09-01 DIAGNOSIS — Z13.1 SCREENING FOR DIABETES MELLITUS: ICD-10-CM

## 2025-09-01 DIAGNOSIS — Z91.09 ALLERGY TO METAL: ICD-10-CM

## 2025-09-01 DIAGNOSIS — Z13.6 ENCOUNTER FOR LIPID SCREENING FOR CARDIOVASCULAR DISEASE: ICD-10-CM

## 2025-09-03 ENCOUNTER — OFFICE VISIT (OUTPATIENT)
Dept: CARDIOLOGY | Facility: CLINIC | Age: 55
End: 2025-09-03
Payer: COMMERCIAL

## 2025-09-03 VITALS — HEART RATE: 68 BPM | DIASTOLIC BLOOD PRESSURE: 84 MMHG | SYSTOLIC BLOOD PRESSURE: 133 MMHG

## 2025-09-03 DIAGNOSIS — R07.2 PRECORDIAL PAIN: Primary | ICD-10-CM

## 2025-09-03 PROCEDURE — 1159F MED LIST DOCD IN RCRD: CPT | Mod: CPTII,S$GLB,, | Performed by: INTERNAL MEDICINE

## 2025-09-03 PROCEDURE — 3044F HG A1C LEVEL LT 7.0%: CPT | Mod: CPTII,S$GLB,, | Performed by: INTERNAL MEDICINE

## 2025-09-03 PROCEDURE — 99214 OFFICE O/P EST MOD 30 MIN: CPT | Mod: S$GLB,,, | Performed by: INTERNAL MEDICINE

## 2025-09-03 PROCEDURE — 1160F RVW MEDS BY RX/DR IN RCRD: CPT | Mod: CPTII,S$GLB,, | Performed by: INTERNAL MEDICINE

## 2025-09-03 PROCEDURE — 3079F DIAST BP 80-89 MM HG: CPT | Mod: CPTII,S$GLB,, | Performed by: INTERNAL MEDICINE

## 2025-09-03 PROCEDURE — 3075F SYST BP GE 130 - 139MM HG: CPT | Mod: CPTII,S$GLB,, | Performed by: INTERNAL MEDICINE

## 2025-09-03 PROCEDURE — 99999 PR PBB SHADOW E&M-EST. PATIENT-LVL II: CPT | Mod: PBBFAC,,, | Performed by: INTERNAL MEDICINE

## (undated) DEVICE — GLOVE BIOGEL SKINSENSE PI 6.5

## (undated) DEVICE — TRAY DRY SKIN SCRUB PREP

## (undated) DEVICE — Device

## (undated) DEVICE — SOL 9P NACL IRR PIC IL

## (undated) DEVICE — SEE MEDLINE ITEM 146313

## (undated) DEVICE — SOL PVP-I SCRUB 7.5% 4OZ

## (undated) DEVICE — SET INFLOW TUBE HYSTER

## (undated) DEVICE — PAD PERINEAL SUPINE

## (undated) DEVICE — KIT DEV NOVASURE ENDOMETRIAL.

## (undated) DEVICE — SET BASIN 48X48IN 6000ML RING

## (undated) DEVICE — SOL IRR NACL .9% 3000ML

## (undated) DEVICE — GLOVE BIOGEL SKINSENSE PI 6.0

## (undated) DEVICE — SOL BETADINE 5%